# Patient Record
Sex: MALE | Race: WHITE | NOT HISPANIC OR LATINO | URBAN - METROPOLITAN AREA
[De-identification: names, ages, dates, MRNs, and addresses within clinical notes are randomized per-mention and may not be internally consistent; named-entity substitution may affect disease eponyms.]

---

## 2024-01-12 ENCOUNTER — EMERGENCY (EMERGENCY)
Facility: HOSPITAL | Age: 89
LOS: 1 days | Discharge: HOME | End: 2024-01-12
Attending: EMERGENCY MEDICINE | Admitting: EMERGENCY MEDICINE
Payer: MEDICARE

## 2024-01-12 VITALS
TEMPERATURE: 97 F | HEART RATE: 84 BPM | SYSTOLIC BLOOD PRESSURE: 135 MMHG | OXYGEN SATURATION: 94 % | DIASTOLIC BLOOD PRESSURE: 77 MMHG | RESPIRATION RATE: 18 BRPM | HEIGHT: 68 IN | WEIGHT: 175.05 LBS

## 2024-01-12 VITALS
DIASTOLIC BLOOD PRESSURE: 74 MMHG | OXYGEN SATURATION: 97 % | SYSTOLIC BLOOD PRESSURE: 124 MMHG | HEART RATE: 92 BPM | RESPIRATION RATE: 16 BRPM

## 2024-01-12 LAB
ALBUMIN SERPL ELPH-MCNC: 3 G/DL — LOW (ref 3.3–5)
ALBUMIN SERPL ELPH-MCNC: 3 G/DL — LOW (ref 3.3–5)
ALP SERPL-CCNC: 76 U/L — SIGNIFICANT CHANGE UP (ref 40–120)
ALP SERPL-CCNC: 76 U/L — SIGNIFICANT CHANGE UP (ref 40–120)
ALT FLD-CCNC: 19 U/L — SIGNIFICANT CHANGE UP (ref 10–45)
ALT FLD-CCNC: 19 U/L — SIGNIFICANT CHANGE UP (ref 10–45)
ANION GAP SERPL CALC-SCNC: 10 MMOL/L — SIGNIFICANT CHANGE UP (ref 5–17)
ANION GAP SERPL CALC-SCNC: 10 MMOL/L — SIGNIFICANT CHANGE UP (ref 5–17)
APPEARANCE UR: ABNORMAL
APPEARANCE UR: ABNORMAL
AST SERPL-CCNC: 18 U/L — SIGNIFICANT CHANGE UP (ref 10–40)
AST SERPL-CCNC: 18 U/L — SIGNIFICANT CHANGE UP (ref 10–40)
BACTERIA # UR AUTO: ABNORMAL /HPF
BACTERIA # UR AUTO: ABNORMAL /HPF
BASOPHILS # BLD AUTO: 0.08 K/UL — SIGNIFICANT CHANGE UP (ref 0–0.2)
BASOPHILS # BLD AUTO: 0.08 K/UL — SIGNIFICANT CHANGE UP (ref 0–0.2)
BASOPHILS NFR BLD AUTO: 0.9 % — SIGNIFICANT CHANGE UP (ref 0–2)
BASOPHILS NFR BLD AUTO: 0.9 % — SIGNIFICANT CHANGE UP (ref 0–2)
BILIRUB SERPL-MCNC: 1.8 MG/DL — HIGH (ref 0.2–1.2)
BILIRUB SERPL-MCNC: 1.8 MG/DL — HIGH (ref 0.2–1.2)
BILIRUB UR-MCNC: NEGATIVE — SIGNIFICANT CHANGE UP
BILIRUB UR-MCNC: NEGATIVE — SIGNIFICANT CHANGE UP
BUN SERPL-MCNC: 27 MG/DL — HIGH (ref 7–23)
BUN SERPL-MCNC: 27 MG/DL — HIGH (ref 7–23)
CALCIUM SERPL-MCNC: 9.4 MG/DL — SIGNIFICANT CHANGE UP (ref 8.4–10.5)
CALCIUM SERPL-MCNC: 9.4 MG/DL — SIGNIFICANT CHANGE UP (ref 8.4–10.5)
CHLORIDE SERPL-SCNC: 108 MMOL/L — SIGNIFICANT CHANGE UP (ref 96–108)
CHLORIDE SERPL-SCNC: 108 MMOL/L — SIGNIFICANT CHANGE UP (ref 96–108)
CO2 SERPL-SCNC: 26 MMOL/L — SIGNIFICANT CHANGE UP (ref 22–31)
CO2 SERPL-SCNC: 26 MMOL/L — SIGNIFICANT CHANGE UP (ref 22–31)
COLOR SPEC: YELLOW — SIGNIFICANT CHANGE UP
COLOR SPEC: YELLOW — SIGNIFICANT CHANGE UP
CREAT SERPL-MCNC: 1.67 MG/DL — HIGH (ref 0.5–1.3)
CREAT SERPL-MCNC: 1.67 MG/DL — HIGH (ref 0.5–1.3)
DIFF PNL FLD: ABNORMAL
DIFF PNL FLD: ABNORMAL
EGFR: 39 ML/MIN/1.73M2 — LOW
EGFR: 39 ML/MIN/1.73M2 — LOW
EOSINOPHIL # BLD AUTO: 0.43 K/UL — SIGNIFICANT CHANGE UP (ref 0–0.5)
EOSINOPHIL # BLD AUTO: 0.43 K/UL — SIGNIFICANT CHANGE UP (ref 0–0.5)
EOSINOPHIL NFR BLD AUTO: 4.6 % — SIGNIFICANT CHANGE UP (ref 0–6)
EOSINOPHIL NFR BLD AUTO: 4.6 % — SIGNIFICANT CHANGE UP (ref 0–6)
EPI CELLS # UR: 1 — SIGNIFICANT CHANGE UP
EPI CELLS # UR: 1 — SIGNIFICANT CHANGE UP
GLUCOSE SERPL-MCNC: 101 MG/DL — HIGH (ref 70–99)
GLUCOSE SERPL-MCNC: 101 MG/DL — HIGH (ref 70–99)
GLUCOSE UR QL: NEGATIVE MG/DL — SIGNIFICANT CHANGE UP
GLUCOSE UR QL: NEGATIVE MG/DL — SIGNIFICANT CHANGE UP
HCT VFR BLD CALC: 41.2 % — SIGNIFICANT CHANGE UP (ref 39–50)
HCT VFR BLD CALC: 41.2 % — SIGNIFICANT CHANGE UP (ref 39–50)
HGB BLD-MCNC: 13.5 G/DL — SIGNIFICANT CHANGE UP (ref 13–17)
HGB BLD-MCNC: 13.5 G/DL — SIGNIFICANT CHANGE UP (ref 13–17)
IMM GRANULOCYTES NFR BLD AUTO: 0.3 % — SIGNIFICANT CHANGE UP (ref 0–0.9)
IMM GRANULOCYTES NFR BLD AUTO: 0.3 % — SIGNIFICANT CHANGE UP (ref 0–0.9)
KETONES UR-MCNC: ABNORMAL MG/DL
KETONES UR-MCNC: ABNORMAL MG/DL
LEUKOCYTE ESTERASE UR-ACNC: ABNORMAL
LEUKOCYTE ESTERASE UR-ACNC: ABNORMAL
LYMPHOCYTES # BLD AUTO: 1.6 K/UL — SIGNIFICANT CHANGE UP (ref 1–3.3)
LYMPHOCYTES # BLD AUTO: 1.6 K/UL — SIGNIFICANT CHANGE UP (ref 1–3.3)
LYMPHOCYTES # BLD AUTO: 17.2 % — SIGNIFICANT CHANGE UP (ref 13–44)
LYMPHOCYTES # BLD AUTO: 17.2 % — SIGNIFICANT CHANGE UP (ref 13–44)
MCHC RBC-ENTMCNC: 30.6 PG — SIGNIFICANT CHANGE UP (ref 27–34)
MCHC RBC-ENTMCNC: 30.6 PG — SIGNIFICANT CHANGE UP (ref 27–34)
MCHC RBC-ENTMCNC: 32.8 GM/DL — SIGNIFICANT CHANGE UP (ref 32–36)
MCHC RBC-ENTMCNC: 32.8 GM/DL — SIGNIFICANT CHANGE UP (ref 32–36)
MCV RBC AUTO: 93.4 FL — SIGNIFICANT CHANGE UP (ref 80–100)
MCV RBC AUTO: 93.4 FL — SIGNIFICANT CHANGE UP (ref 80–100)
MONOCYTES # BLD AUTO: 0.8 K/UL — SIGNIFICANT CHANGE UP (ref 0–0.9)
MONOCYTES # BLD AUTO: 0.8 K/UL — SIGNIFICANT CHANGE UP (ref 0–0.9)
MONOCYTES NFR BLD AUTO: 8.6 % — SIGNIFICANT CHANGE UP (ref 2–14)
MONOCYTES NFR BLD AUTO: 8.6 % — SIGNIFICANT CHANGE UP (ref 2–14)
NEUTROPHILS # BLD AUTO: 6.34 K/UL — SIGNIFICANT CHANGE UP (ref 1.8–7.4)
NEUTROPHILS # BLD AUTO: 6.34 K/UL — SIGNIFICANT CHANGE UP (ref 1.8–7.4)
NEUTROPHILS NFR BLD AUTO: 68.4 % — SIGNIFICANT CHANGE UP (ref 43–77)
NEUTROPHILS NFR BLD AUTO: 68.4 % — SIGNIFICANT CHANGE UP (ref 43–77)
NITRITE UR-MCNC: POSITIVE
NITRITE UR-MCNC: POSITIVE
NRBC # BLD: 0 /100 WBCS — SIGNIFICANT CHANGE UP (ref 0–0)
NRBC # BLD: 0 /100 WBCS — SIGNIFICANT CHANGE UP (ref 0–0)
PH UR: 5 — SIGNIFICANT CHANGE UP (ref 5–8)
PH UR: 5 — SIGNIFICANT CHANGE UP (ref 5–8)
PLATELET # BLD AUTO: 255 K/UL — SIGNIFICANT CHANGE UP (ref 150–400)
PLATELET # BLD AUTO: 255 K/UL — SIGNIFICANT CHANGE UP (ref 150–400)
POTASSIUM SERPL-MCNC: 4.4 MMOL/L — SIGNIFICANT CHANGE UP (ref 3.5–5.3)
POTASSIUM SERPL-MCNC: 4.4 MMOL/L — SIGNIFICANT CHANGE UP (ref 3.5–5.3)
POTASSIUM SERPL-SCNC: 4.4 MMOL/L — SIGNIFICANT CHANGE UP (ref 3.5–5.3)
POTASSIUM SERPL-SCNC: 4.4 MMOL/L — SIGNIFICANT CHANGE UP (ref 3.5–5.3)
PROT SERPL-MCNC: 7.2 G/DL — SIGNIFICANT CHANGE UP (ref 6–8.3)
PROT SERPL-MCNC: 7.2 G/DL — SIGNIFICANT CHANGE UP (ref 6–8.3)
PROT UR-MCNC: 100 MG/DL
PROT UR-MCNC: 100 MG/DL
RBC # BLD: 4.41 M/UL — SIGNIFICANT CHANGE UP (ref 4.2–5.8)
RBC # BLD: 4.41 M/UL — SIGNIFICANT CHANGE UP (ref 4.2–5.8)
RBC # FLD: 14 % — SIGNIFICANT CHANGE UP (ref 10.3–14.5)
RBC # FLD: 14 % — SIGNIFICANT CHANGE UP (ref 10.3–14.5)
RBC CASTS # UR COMP ASSIST: 15 /HPF — HIGH (ref 0–4)
RBC CASTS # UR COMP ASSIST: 15 /HPF — HIGH (ref 0–4)
SODIUM SERPL-SCNC: 144 MMOL/L — SIGNIFICANT CHANGE UP (ref 135–145)
SODIUM SERPL-SCNC: 144 MMOL/L — SIGNIFICANT CHANGE UP (ref 135–145)
SP GR SPEC: 1.02 — SIGNIFICANT CHANGE UP (ref 1–1.03)
SP GR SPEC: 1.02 — SIGNIFICANT CHANGE UP (ref 1–1.03)
UROBILINOGEN FLD QL: 1 MG/DL — SIGNIFICANT CHANGE UP (ref 0.2–1)
UROBILINOGEN FLD QL: 1 MG/DL — SIGNIFICANT CHANGE UP (ref 0.2–1)
WBC # BLD: 9.28 K/UL — SIGNIFICANT CHANGE UP (ref 3.8–10.5)
WBC # BLD: 9.28 K/UL — SIGNIFICANT CHANGE UP (ref 3.8–10.5)
WBC # FLD AUTO: 9.28 K/UL — SIGNIFICANT CHANGE UP (ref 3.8–10.5)
WBC # FLD AUTO: 9.28 K/UL — SIGNIFICANT CHANGE UP (ref 3.8–10.5)
WBC UR QL: ABNORMAL /HPF (ref 0–5)
WBC UR QL: ABNORMAL /HPF (ref 0–5)

## 2024-01-12 PROCEDURE — 99285 EMERGENCY DEPT VISIT HI MDM: CPT | Mod: 25

## 2024-01-12 PROCEDURE — 80053 COMPREHEN METABOLIC PANEL: CPT

## 2024-01-12 PROCEDURE — 71045 X-RAY EXAM CHEST 1 VIEW: CPT

## 2024-01-12 PROCEDURE — 87186 SC STD MICRODIL/AGAR DIL: CPT

## 2024-01-12 PROCEDURE — 93010 ELECTROCARDIOGRAM REPORT: CPT

## 2024-01-12 PROCEDURE — 72170 X-RAY EXAM OF PELVIS: CPT | Mod: 26

## 2024-01-12 PROCEDURE — 81001 URINALYSIS AUTO W/SCOPE: CPT

## 2024-01-12 PROCEDURE — 72170 X-RAY EXAM OF PELVIS: CPT

## 2024-01-12 PROCEDURE — 70450 CT HEAD/BRAIN W/O DYE: CPT | Mod: MA

## 2024-01-12 PROCEDURE — 96360 HYDRATION IV INFUSION INIT: CPT

## 2024-01-12 PROCEDURE — 85025 COMPLETE CBC W/AUTO DIFF WBC: CPT

## 2024-01-12 PROCEDURE — 36415 COLL VENOUS BLD VENIPUNCTURE: CPT

## 2024-01-12 PROCEDURE — 87077 CULTURE AEROBIC IDENTIFY: CPT

## 2024-01-12 PROCEDURE — 70450 CT HEAD/BRAIN W/O DYE: CPT | Mod: 26,MA

## 2024-01-12 PROCEDURE — 93005 ELECTROCARDIOGRAM TRACING: CPT

## 2024-01-12 PROCEDURE — 71045 X-RAY EXAM CHEST 1 VIEW: CPT | Mod: 26

## 2024-01-12 PROCEDURE — 87086 URINE CULTURE/COLONY COUNT: CPT

## 2024-01-12 PROCEDURE — 99284 EMERGENCY DEPT VISIT MOD MDM: CPT

## 2024-01-12 RX ORDER — SODIUM CHLORIDE 9 MG/ML
500 INJECTION INTRAMUSCULAR; INTRAVENOUS; SUBCUTANEOUS ONCE
Refills: 0 | Status: COMPLETED | OUTPATIENT
Start: 2024-01-12 | End: 2024-01-12

## 2024-01-12 RX ORDER — CEPHALEXIN 500 MG
500 CAPSULE ORAL ONCE
Refills: 0 | Status: COMPLETED | OUTPATIENT
Start: 2024-01-12 | End: 2024-01-12

## 2024-01-12 RX ORDER — CEPHALEXIN 500 MG
1 CAPSULE ORAL
Qty: 42 | Refills: 0
Start: 2024-01-12 | End: 2024-01-25

## 2024-01-12 RX ADMIN — SODIUM CHLORIDE 2000 MILLILITER(S): 9 INJECTION INTRAMUSCULAR; INTRAVENOUS; SUBCUTANEOUS at 15:35

## 2024-01-12 RX ADMIN — Medication 500 MILLIGRAM(S): at 17:42

## 2024-01-12 RX ADMIN — SODIUM CHLORIDE 1000 MILLILITER(S): 9 INJECTION INTRAMUSCULAR; INTRAVENOUS; SUBCUTANEOUS at 14:22

## 2024-01-12 RX ADMIN — SODIUM CHLORIDE 500 MILLILITER(S): 9 INJECTION INTRAMUSCULAR; INTRAVENOUS; SUBCUTANEOUS at 15:35

## 2024-01-12 NOTE — ED ADULT NURSE REASSESSMENT NOTE - NS ED NURSE REASSESS COMMENT FT1
RN preformed straight catheterization at bedside using sterile technique. Drained 100cc of cloudy kelsi urine. UA & culture sent to lab. Patient tolerated procedure well.

## 2024-01-12 NOTE — ED ADULT NURSE NOTE - NSFALLHARMRISKINTERV_ED_ALL_ED
Assistance OOB with selected safe patient handling equipment if applicable/Assistance with ambulation/Communicate risk of Fall with Harm to all staff, patient, and family/Monitor gait and stability/Monitor for mental status changes and reorient to person, place, and time, as needed/Move patient closer to nursing station/within visual sight of ED staff/Provide visual cue: red socks, yellow wristband, yellow gown, etc/Reinforce activity limits and safety measures with patient and family/Toileting schedule using arm’s reach rule for commode and bathroom/Use of alarms - bed, stretcher, chair and/or video monitoring/Bed in lowest position, wheels locked, appropriate side rails in place/Call bell, personal items and telephone in reach/Instruct patient to call for assistance before getting out of bed/chair/stretcher/Non-slip footwear applied when patient is off stretcher/[place] to call system/Physically safe environment - no spills, clutter or unnecessary equipment/Purposeful Proactive Rounding/Room/bathroom lighting operational, light cord in reach Assistance OOB with selected safe patient handling equipment if applicable/Assistance with ambulation/Communicate risk of Fall with Harm to all staff, patient, and family/Monitor gait and stability/Monitor for mental status changes and reorient to person, place, and time, as needed/Move patient closer to nursing station/within visual sight of ED staff/Provide visual cue: red socks, yellow wristband, yellow gown, etc/Reinforce activity limits and safety measures with patient and family/Toileting schedule using arm’s reach rule for commode and bathroom/Use of alarms - bed, stretcher, chair and/or video monitoring/Bed in lowest position, wheels locked, appropriate side rails in place/Call bell, personal items and telephone in reach/Instruct patient to call for assistance before getting out of bed/chair/stretcher/Non-slip footwear applied when patient is off stretcher/[place] to call system/Physically safe environment - no spills, clutter or unnecessary equipment/Purposeful Proactive Rounding/Room/bathroom lighting operational, light cord in reach

## 2024-01-12 NOTE — ED PROVIDER NOTE - OBJECTIVE STATEMENT
88-year-old male presents to the emergency department status post recent history of multiple falls at nursing facility.  This report was provided to us by EMS patient states that he does not know why he is here and he has no complaints at this time.  He denies headache he denies chest pain he denies abdominal pain.  Upon chart review patient has a history of multiple UTIs metabolic encephalopathy he is DNR/DNI.Past medical history atrial fibrillation bladder cancer hyperlipidemia hypertension dementia.

## 2024-01-12 NOTE — ED PROVIDER NOTE - PATIENT PORTAL LINK FT
You can access the FollowMyHealth Patient Portal offered by [facility] by registering at the following website: [email or web address]. By joining [employer]’s FollowMyHealth portal, you will also be able to view your health information using other applications (apps) compatible with our system. You can access the FollowMyHealth Patient Portal offered by [facility] by registering at the following website: [email or web address]. By joining [employer]’s FollowMyHealth portal, you will also be able to view your health information using other applications (apps) compatible with our system.

## 2024-01-12 NOTE — ED ADULT TRIAGE NOTE - CHIEF COMPLAINT QUOTE
Patient brought in by EMS from [facility] with complaint of frequent falls and lethargic. As per EMS, patient had three unwitnessed falls. EMS unsure if patient hit head. Patient is awake. PMH of dementia. Patient brought in by EMS from [facility] with complaint of frequent falls and lethargic. As per EMS, patient had three unwitnessed falls. EMS unsure if patient hit head. Patient is awake. PMH of dementia. Patient brought in by EMS from [facility] with complaint of frequent falls and lethargic. As per EMS, patient had three unwitnessed falls. EMS unsure if patient hit head or any LOC. Patient is awake and responsive. PMH of dementia. Patient brought in by EMS from [facility] with complaint of frequent falls and lethargic. As per EMS, patient had three unwitnessed falls. EMS unsure if patient hit head or any LOC. Patient is awake and responsive. PMH of dementia. Patient brought in by EMS from [facility] with complaint of frequent falls and lethargic. As per EMS, patient had three unwitnessed falls. EMS unsure if patient hit head or any LOC. Patient is awake and responsive. Patient is unable to follow commands. PMH of dementia. Patient brought in by EMS from [facility] with complaint of frequent falls and lethargic. As per EMS, patient had three unwitnessed falls. EMS unsure if patient hit head or any LOC. Patient is awake and responsive. Patient is unable to follow commands. PMH of dementia. Patient brought in by EMS from [facility] with complaint of frequent falls and lethargic. As per EMS, patient had three unwitnessed falls. Patient is on Eliquis. EMS unsure if patient hit head or any LOC. Patient is awake and responsive. Patient is unable to follow commands. PMH of dementia. Patient brought in by EMS from [facility] with complaint of frequent falls and lethargic. As per EMS, patient had three unwitnessed falls. Patient is on Eliquis. EMS unsure if patient hit head or any LOC. Patient is awake and responsive. Patient is unable to follow commands. PMH of dementia.

## 2024-01-12 NOTE — ED PROVIDER NOTE - PROVIDER TOKENS
PROVIDER:[TOKEN:[[id number]],FOLLOWUP:[1-3 Days]] PROVIDER:[TOKEN:[[id number]],FOLLOWUP:[1-3 Days]]

## 2024-01-12 NOTE — ED PROVIDER NOTE - CARE PROVIDER_API CALL
Regino Hartmann  Internal Medicine  [address] [place] [address]  Phone: [phone]  Fax: [phone]  Follow Up Time: 1-3 Days   Regino Hartmann  Internal Medicine  [address] [place] [address]  Phone: [phone]  Fax: [phone]  Follow Up Time: 1-3 Days

## 2024-01-12 NOTE — ED ADULT NURSE NOTE - CHIEF COMPLAINT QUOTE
Patient brought in by EMS from [facility] with complaint of frequent falls and lethargic. As per EMS, patient had three unwitnessed falls. Patient is on Eliquis. EMS unsure if patient hit head or any LOC. Patient is awake and responsive. Patient is unable to follow commands. PMH of dementia. Patient brought in by EMS from [facility] with complaint of frequent falls and lethargic. As per EMS, patient had three unwitnessed falls. Patient is on Eliquis. EMS unsure if patient hit head or any LOC. Patient is awake and responsive. Patient is unable to follow commands. PMH of dementia.

## 2024-01-12 NOTE — ED PROVIDER NOTE - CARE PROVIDERS DIRECT ADDRESSES
,[email or web address]

## 2024-01-12 NOTE — ED PROVIDER NOTE - NSFOLLOWUPINSTRUCTIONS_ED_ALL_ED_FT
English    Urinary Tract Infection, Adult    A urinary tract infection (UTI) is an infection of any part of the urinary tract. The urinary tract includes the kidneys, ureters, bladder, and urethra. These organs make, store, and get rid of urine in the body.    An upper UTI affects the ureters and kidneys. A lower UTI affects the bladder and urethra.    What are the causes?  Most urinary tract infections are caused by bacteria in your genital area around your urethra, where urine leaves your body. These bacteria grow and cause inflammation of your urinary tract.    What increases the risk?  You are more likely to develop this condition if:  You have a urinary catheter that stays in place.  You are not able to control when you urinate or have a bowel movement (incontinence).  You are female and you:  Use a spermicide or diaphragm for birth control.  Have low estrogen levels.  Are pregnant.  You have certain genes that increase your risk.  You are sexually active.  You take antibiotic medicines.  You have a condition that causes your flow of urine to slow down, such as:  An enlarged prostate, if you are male.  Blockage in your urethra.  A kidney stone.  A nerve condition that affects your bladder control (neurogenic bladder).  Not getting enough to drink, or not urinating often.  You have certain medical conditions, such as:  Diabetes.  A weak disease-fighting system (immunesystem).  Sickle cell disease.  Gout.  Spinal cord injury.  What are the signs or symptoms?  Symptoms of this condition include:  Needing to urinate right away (urgency).  Frequent urination. This may include small amounts of urine each time you urinate.  Pain or burning with urination.  Blood in the urine.  Urine that smells bad or unusual.  Trouble urinating.  Cloudy urine.  Vaginal discharge, if you are female.  Pain in the abdomen or the lower back.  You may also have:  Vomiting or a decreased appetite.  Confusion.  Irritability or tiredness.  A fever or chills.  Diarrhea.  The first symptom in older adults may be confusion. In some cases, they may not have any symptoms until the infection has worsened.    How is this diagnosed?  This condition is diagnosed based on your medical history and a physical exam. You may also have other tests, including:  Urine tests.  Blood tests.  Tests for STIs (sexually transmitted infections).  If you have had more than one UTI, a cystoscopy or imaging studies may be done to determine the cause of the infections.    How is this treated?  Treatment for this condition includes:  Antibiotic medicine.  Over-the-counter medicines to treat discomfort.  Drinking enough water to stay hydrated.  If you have frequent infections or have other conditions such as a kidney stone, you may need to see a health care provider who specializes in the urinary tract (urologist).    In rare cases, urinary tract infections can cause sepsis. Sepsis is a life-threatening condition that occurs when the body responds to an infection. Sepsis is treated in the hospital with IV antibiotics, fluids, and other medicines.    Follow these instructions at home:    Medicines    Take over-the-counter and prescription medicines only as told by your health care provider.  If you were prescribed an antibiotic medicine, take it as told by your health care provider. Do not stop using the antibiotic even if you start to feel better.  General instructions    Make sure you:  Empty your bladder often and completely. Do not hold urine for long periods of time.  Empty your bladder after sex.  Wipe from front to back after urinating or having a bowel movement if you are female. Use each tissue only one time when you wipe.  Drink enough fluid to keep your urine pale yellow.  Keep all follow-up visits. This is important.  Contact a health care provider if:  Your symptoms do not get better after 1–2 days.  Your symptoms go away and then return.  Get help right away if:  You have severe pain in your back or your lower abdomen.  You have a fever or chills.  You have nausea or vomiting.  Summary  A urinary tract infection (UTI) is an infection of any part of the urinary tract, which includes the kidneys, ureters, bladder, and urethra.  Most urinary tract infections are caused by bacteria in your genital area.  Treatment for this condition often includes antibiotic medicines.  If you were prescribed an antibiotic medicine, take it as told by your health care provider. Do not stop using the antibiotic even if you start to feel better.  Keep all follow-up visits. This is important.  This information is not intended to replace advice given to you by your health care provider. Make sure you discuss any questions you have with your health care provider.    Document Revised: 07/30/2021 Document Reviewed: 07/30/2021  [employer] Patient Education © 2023 [employer]  [employer] logo  Terms and Conditions  Privacy Policy  Editorial Policy  All content on this site: Copyright © 2024 [employer], its licensors, and contributors. All rights are reserved, including those for text and data mining, AI training, and similar technologies. For all open access content, the Creative Commons licensing terms apply.  Cookies are used by this site. To decline or learn more, visit our Cookies page.  [employer] English    Urinary Tract Infection, Adult    A urinary tract infection (UTI) is an infection of any part of the urinary tract. The urinary tract includes the kidneys, ureters, bladder, and urethra. These organs make, store, and get rid of urine in the body.    An upper UTI affects the ureters and kidneys. A lower UTI affects the bladder and urethra.    What are the causes?  Most urinary tract infections are caused by bacteria in your genital area around your urethra, where urine leaves your body. These bacteria grow and cause inflammation of your urinary tract.    What increases the risk?  You are more likely to develop this condition if:  You have a urinary catheter that stays in place.  You are not able to control when you urinate or have a bowel movement (incontinence).  You are female and you:  Use a spermicide or diaphragm for birth control.  Have low estrogen levels.  Are pregnant.  You have certain genes that increase your risk.  You are sexually active.  You take antibiotic medicines.  You have a condition that causes your flow of urine to slow down, such as:  An enlarged prostate, if you are male.  Blockage in your urethra.  A kidney stone.  A nerve condition that affects your bladder control (neurogenic bladder).  Not getting enough to drink, or not urinating often.  You have certain medical conditions, such as:  Diabetes.  A weak disease-fighting system (immunesystem).  Sickle cell disease.  Gout.  Spinal cord injury.  What are the signs or symptoms?  Symptoms of this condition include:  Needing to urinate right away (urgency).  Frequent urination. This may include small amounts of urine each time you urinate.  Pain or burning with urination.  Blood in the urine.  Urine that smells bad or unusual.  Trouble urinating.  Cloudy urine.  Vaginal discharge, if you are female.  Pain in the abdomen or the lower back.  You may also have:  Vomiting or a decreased appetite.  Confusion.  Irritability or tiredness.  A fever or chills.  Diarrhea.  The first symptom in older adults may be confusion. In some cases, they may not have any symptoms until the infection has worsened.    How is this diagnosed?  This condition is diagnosed based on your medical history and a physical exam. You may also have other tests, including:  Urine tests.  Blood tests.  Tests for STIs (sexually transmitted infections).  If you have had more than one UTI, a cystoscopy or imaging studies may be done to determine the cause of the infections.    How is this treated?  Treatment for this condition includes:  Antibiotic medicine.  Over-the-counter medicines to treat discomfort.  Drinking enough water to stay hydrated.  If you have frequent infections or have other conditions such as a kidney stone, you may need to see a health care provider who specializes in the urinary tract (urologist).    In rare cases, urinary tract infections can cause sepsis. Sepsis is a life-threatening condition that occurs when the body responds to an infection. Sepsis is treated in the hospital with IV antibiotics, fluids, and other medicines.    Follow these instructions at home:    Medicines    Take over-the-counter and prescription medicines only as told by your health care provider.  If you were prescribed an antibiotic medicine, take it as told by your health care provider. Do not stop using the antibiotic even if you start to feel better.  General instructions    Make sure you:  Empty your bladder often and completely. Do not hold urine for long periods of time.  Empty your bladder after sex.  Wipe from front to back after urinating or having a bowel movement if you are female. Use each tissue only one time when you wipe.  Drink enough fluid to keep your urine pale yellow.  Keep all follow-up visits. This is important.  Contact a health care provider if:  Your symptoms do not get better after 1–2 days.  Your symptoms go away and then return.  Get help right away if:  You have severe pain in your back or your lower abdomen.  You have a fever or chills.  You have nausea or vomiting.  Summary  A urinary tract infection (UTI) is an infection of any part of the urinary tract, which includes the kidneys, ureters, bladder, and urethra.  Most urinary tract infections are caused by bacteria in your genital area.  Treatment for this condition often includes antibiotic medicines.  If you were prescribed an antibiotic medicine, take it as told by your health care provider. Do not stop using the antibiotic even if you start to feel better.  Keep all follow-up visits. This is important.  This information is not intended to replace advice given to you by your health care provider. Make sure you discuss any questions you have with your health care provider.    Document Revised: 07/30/2021 Document Reviewed: 07/30/2021  [employer] Patient Education © 2023 [employer]  [employer] logo  Terms and Conditions  Privacy Policy  Editorial Policy  All content on this site: Copyright © 2024 [employer], its licensors, and contributors. All rights are reserved, including those for text and data mining, AI training, and similar technologies. For all open access content, the Creative Commons licensing terms apply.  Cookies are used by this site. To decline or learn more, visit our Cookies page.  [employer]

## 2024-01-12 NOTE — ED ADULT NURSE NOTE - DOES PATIENT HAVE ADVANCE DIRECTIVE
Resolved  - D-Dimer 373 (negative for age adjusted 600). LE Venous Doppler 1/10 negative for DVT  - Denies SOB, LE edema No

## 2024-01-16 LAB
-  AMOXICILLIN/CLAVULANIC ACID: SIGNIFICANT CHANGE UP
-  AMPICILLIN/SULBACTAM: SIGNIFICANT CHANGE UP
-  AMPICILLIN: SIGNIFICANT CHANGE UP
-  AZTREONAM: SIGNIFICANT CHANGE UP
-  CEFAZOLIN: SIGNIFICANT CHANGE UP
-  CEFEPIME: SIGNIFICANT CHANGE UP
-  CEFOXITIN: SIGNIFICANT CHANGE UP
-  CEFTRIAXONE: SIGNIFICANT CHANGE UP
-  CIPROFLOXACIN: SIGNIFICANT CHANGE UP
-  ERTAPENEM: SIGNIFICANT CHANGE UP
-  GENTAMICIN: SIGNIFICANT CHANGE UP
-  IMIPENEM: SIGNIFICANT CHANGE UP
-  LEVOFLOXACIN: SIGNIFICANT CHANGE UP
-  MEROPENEM: SIGNIFICANT CHANGE UP
-  NITROFURANTOIN: SIGNIFICANT CHANGE UP
-  PIPERACILLIN/TAZOBACTAM: SIGNIFICANT CHANGE UP
-  TOBRAMYCIN: SIGNIFICANT CHANGE UP
-  TRIMETHOPRIM/SULFAMETHOXAZOLE: SIGNIFICANT CHANGE UP
CULTURE RESULTS: ABNORMAL
METHOD TYPE: SIGNIFICANT CHANGE UP
ORGANISM # SPEC MICROSCOPIC CNT: ABNORMAL
ORGANISM # SPEC MICROSCOPIC CNT: SIGNIFICANT CHANGE UP
SPECIMEN SOURCE: SIGNIFICANT CHANGE UP

## 2024-07-15 ENCOUNTER — EMERGENCY (EMERGENCY)
Facility: HOSPITAL | Age: 89
LOS: 1 days | Discharge: HOME | End: 2024-07-15
Attending: EMERGENCY MEDICINE | Admitting: EMERGENCY MEDICINE
Payer: MEDICARE

## 2024-07-15 VITALS
DIASTOLIC BLOOD PRESSURE: 91 MMHG | RESPIRATION RATE: 15 BRPM | OXYGEN SATURATION: 99 % | HEART RATE: 88 BPM | SYSTOLIC BLOOD PRESSURE: 153 MMHG

## 2024-07-15 VITALS
SYSTOLIC BLOOD PRESSURE: 115 MMHG | TEMPERATURE: 97 F | WEIGHT: 179.9 LBS | HEIGHT: 68 IN | HEART RATE: 83 BPM | DIASTOLIC BLOOD PRESSURE: 71 MMHG | RESPIRATION RATE: 16 BRPM | OXYGEN SATURATION: 98 %

## 2024-07-15 PROBLEM — N39.0 URINARY TRACT INFECTION, SITE NOT SPECIFIED: Chronic | Status: ACTIVE | Noted: 2024-01-12

## 2024-07-15 PROCEDURE — 99283 EMERGENCY DEPT VISIT LOW MDM: CPT

## 2024-07-15 PROCEDURE — 99282 EMERGENCY DEPT VISIT SF MDM: CPT

## 2024-07-22 ENCOUNTER — INPATIENT (INPATIENT)
Facility: HOSPITAL | Age: 89
LOS: 14 days | Discharge: NURSING, REHAB OR LONG-TERM CARE | End: 2024-08-06
Attending: INTERNAL MEDICINE | Admitting: INTERNAL MEDICINE
Payer: MEDICARE

## 2024-07-22 VITALS
DIASTOLIC BLOOD PRESSURE: 76 MMHG | OXYGEN SATURATION: 100 % | SYSTOLIC BLOOD PRESSURE: 121 MMHG | RESPIRATION RATE: 14 BRPM | HEART RATE: 80 BPM

## 2024-07-22 DIAGNOSIS — Z29.9 ENCOUNTER FOR PROPHYLACTIC MEASURES, UNSPECIFIED: ICD-10-CM

## 2024-07-22 DIAGNOSIS — I48.20 CHRONIC ATRIAL FIBRILLATION, UNSPECIFIED: ICD-10-CM

## 2024-07-22 DIAGNOSIS — I10 ESSENTIAL (PRIMARY) HYPERTENSION: ICD-10-CM

## 2024-07-22 DIAGNOSIS — F03.918 UNSPECIFIED DEMENTIA, UNSPECIFIED SEVERITY, WITH OTHER BEHAVIORAL DISTURBANCE: ICD-10-CM

## 2024-07-22 DIAGNOSIS — N18.30 CHRONIC KIDNEY DISEASE, STAGE 3 UNSPECIFIED: ICD-10-CM

## 2024-07-22 DIAGNOSIS — F03.911 UNSPECIFIED DEMENTIA, UNSPECIFIED SEVERITY, WITH AGITATION: ICD-10-CM

## 2024-07-22 LAB
ADD ON TEST-SPECIMEN IN LAB: SIGNIFICANT CHANGE UP
ALBUMIN SERPL ELPH-MCNC: 3.6 G/DL — SIGNIFICANT CHANGE UP (ref 3.3–5)
ALP SERPL-CCNC: 85 U/L — SIGNIFICANT CHANGE UP (ref 40–120)
ALT FLD-CCNC: 7 U/L — SIGNIFICANT CHANGE UP (ref 4–41)
AMPHET UR-MCNC: NEGATIVE — SIGNIFICANT CHANGE UP
ANION GAP SERPL CALC-SCNC: 13 MMOL/L — SIGNIFICANT CHANGE UP (ref 7–14)
APAP SERPL-MCNC: <10 UG/ML — LOW (ref 15–25)
APPEARANCE UR: CLEAR — SIGNIFICANT CHANGE UP
AST SERPL-CCNC: 15 U/L — SIGNIFICANT CHANGE UP (ref 4–40)
BARBITURATES UR SCN-MCNC: NEGATIVE — SIGNIFICANT CHANGE UP
BASOPHILS # BLD AUTO: 0.1 K/UL — SIGNIFICANT CHANGE UP (ref 0–0.2)
BASOPHILS NFR BLD AUTO: 1.4 % — SIGNIFICANT CHANGE UP (ref 0–2)
BENZODIAZ UR-MCNC: POSITIVE
BILIRUB SERPL-MCNC: 0.4 MG/DL — SIGNIFICANT CHANGE UP (ref 0.2–1.2)
BILIRUB UR-MCNC: NEGATIVE — SIGNIFICANT CHANGE UP
BUN SERPL-MCNC: 40 MG/DL — HIGH (ref 7–23)
CALCIUM SERPL-MCNC: 9.2 MG/DL — SIGNIFICANT CHANGE UP (ref 8.4–10.5)
CHLORIDE SERPL-SCNC: 105 MMOL/L — SIGNIFICANT CHANGE UP (ref 98–107)
CO2 SERPL-SCNC: 21 MMOL/L — LOW (ref 22–31)
COCAINE METAB.OTHER UR-MCNC: NEGATIVE — SIGNIFICANT CHANGE UP
COLOR SPEC: YELLOW — SIGNIFICANT CHANGE UP
CREAT SERPL-MCNC: 1.68 MG/DL — HIGH (ref 0.5–1.3)
CREATININE URINE RESULT, DAU: 57 MG/DL — SIGNIFICANT CHANGE UP
DIFF PNL FLD: NEGATIVE — SIGNIFICANT CHANGE UP
EGFR: 39 ML/MIN/1.73M2 — LOW
EOSINOPHIL # BLD AUTO: 0.53 K/UL — HIGH (ref 0–0.5)
EOSINOPHIL NFR BLD AUTO: 7.5 % — HIGH (ref 0–6)
ETHANOL SERPL-MCNC: <10 MG/DL — SIGNIFICANT CHANGE UP
FENTANYL UR QL SCN: NEGATIVE — SIGNIFICANT CHANGE UP
GLUCOSE SERPL-MCNC: 87 MG/DL — SIGNIFICANT CHANGE UP (ref 70–99)
GLUCOSE UR QL: NEGATIVE MG/DL — SIGNIFICANT CHANGE UP
HCT VFR BLD CALC: 37.9 % — LOW (ref 39–50)
HGB BLD-MCNC: 12.3 G/DL — LOW (ref 13–17)
IANC: 4.64 K/UL — SIGNIFICANT CHANGE UP (ref 1.8–7.4)
IMM GRANULOCYTES NFR BLD AUTO: 0.4 % — SIGNIFICANT CHANGE UP (ref 0–0.9)
KETONES UR-MCNC: NEGATIVE MG/DL — SIGNIFICANT CHANGE UP
LEUKOCYTE ESTERASE UR-ACNC: NEGATIVE — SIGNIFICANT CHANGE UP
LYMPHOCYTES # BLD AUTO: 1.08 K/UL — SIGNIFICANT CHANGE UP (ref 1–3.3)
LYMPHOCYTES # BLD AUTO: 15.3 % — SIGNIFICANT CHANGE UP (ref 13–44)
MCHC RBC-ENTMCNC: 30.1 PG — SIGNIFICANT CHANGE UP (ref 27–34)
MCHC RBC-ENTMCNC: 32.5 GM/DL — SIGNIFICANT CHANGE UP (ref 32–36)
MCV RBC AUTO: 92.9 FL — SIGNIFICANT CHANGE UP (ref 80–100)
METHADONE UR-MCNC: NEGATIVE — SIGNIFICANT CHANGE UP
MONOCYTES # BLD AUTO: 0.69 K/UL — SIGNIFICANT CHANGE UP (ref 0–0.9)
MONOCYTES NFR BLD AUTO: 9.8 % — SIGNIFICANT CHANGE UP (ref 2–14)
NEUTROPHILS # BLD AUTO: 4.64 K/UL — SIGNIFICANT CHANGE UP (ref 1.8–7.4)
NEUTROPHILS NFR BLD AUTO: 65.6 % — SIGNIFICANT CHANGE UP (ref 43–77)
NITRITE UR-MCNC: NEGATIVE — SIGNIFICANT CHANGE UP
NRBC # BLD: 0 /100 WBCS — SIGNIFICANT CHANGE UP (ref 0–0)
NRBC # FLD: 0 K/UL — SIGNIFICANT CHANGE UP (ref 0–0)
OPIATES UR-MCNC: NEGATIVE — SIGNIFICANT CHANGE UP
OXYCODONE UR-MCNC: NEGATIVE — SIGNIFICANT CHANGE UP
PCP SPEC-MCNC: SIGNIFICANT CHANGE UP
PCP UR-MCNC: NEGATIVE — SIGNIFICANT CHANGE UP
PH UR: 5.5 — SIGNIFICANT CHANGE UP (ref 5–8)
PLATELET # BLD AUTO: 246 K/UL — SIGNIFICANT CHANGE UP (ref 150–400)
POTASSIUM SERPL-MCNC: 4.2 MMOL/L — SIGNIFICANT CHANGE UP (ref 3.5–5.3)
POTASSIUM SERPL-SCNC: 4.2 MMOL/L — SIGNIFICANT CHANGE UP (ref 3.5–5.3)
PROT SERPL-MCNC: 6.8 G/DL — SIGNIFICANT CHANGE UP (ref 6–8.3)
PROT UR-MCNC: NEGATIVE MG/DL — SIGNIFICANT CHANGE UP
RBC # BLD: 4.08 M/UL — LOW (ref 4.2–5.8)
RBC # FLD: 14.4 % — SIGNIFICANT CHANGE UP (ref 10.3–14.5)
SALICYLATES SERPL-MCNC: <0.3 MG/DL — LOW (ref 15–30)
SODIUM SERPL-SCNC: 139 MMOL/L — SIGNIFICANT CHANGE UP (ref 135–145)
SP GR SPEC: 1.01 — SIGNIFICANT CHANGE UP (ref 1–1.03)
THC UR QL: NEGATIVE — SIGNIFICANT CHANGE UP
UROBILINOGEN FLD QL: 1 MG/DL — SIGNIFICANT CHANGE UP (ref 0.2–1)
WBC # BLD: 7.07 K/UL — SIGNIFICANT CHANGE UP (ref 3.8–10.5)
WBC # FLD AUTO: 7.07 K/UL — SIGNIFICANT CHANGE UP (ref 3.8–10.5)

## 2024-07-22 PROCEDURE — 99223 1ST HOSP IP/OBS HIGH 75: CPT

## 2024-07-22 PROCEDURE — 70450 CT HEAD/BRAIN W/O DYE: CPT | Mod: 26,MC

## 2024-07-22 PROCEDURE — 99285 EMERGENCY DEPT VISIT HI MDM: CPT | Mod: GC

## 2024-07-22 PROCEDURE — 71045 X-RAY EXAM CHEST 1 VIEW: CPT | Mod: 26

## 2024-07-22 RX ORDER — PRAMIPEXOLE DIHYDROCHLORIDE 0.5 MG/1
2.5 TABLET ORAL ONCE
Refills: 0 | Status: COMPLETED | OUTPATIENT
Start: 2024-07-22 | End: 2024-07-22

## 2024-07-22 RX ORDER — MAGNESIUM SULFATE 500 MG/ML
1 VIAL (ML) INJECTION ONCE
Refills: 0 | Status: DISCONTINUED | OUTPATIENT
Start: 2024-07-22 | End: 2024-07-22

## 2024-07-22 RX ORDER — LORAZEPAM 1 MG/1
2 TABLET ORAL ONCE
Refills: 0 | Status: DISCONTINUED | OUTPATIENT
Start: 2024-07-22 | End: 2024-07-22

## 2024-07-22 RX ADMIN — PRAMIPEXOLE DIHYDROCHLORIDE 2.5 MILLIGRAM(S): 0.5 TABLET ORAL at 16:23

## 2024-07-22 RX ADMIN — LORAZEPAM 2 MILLIGRAM(S): 1 TABLET ORAL at 19:36

## 2024-07-22 RX ADMIN — PRAMIPEXOLE DIHYDROCHLORIDE 2.5 MILLIGRAM(S): 0.5 TABLET ORAL at 16:38

## 2024-07-22 NOTE — ED ADULT NURSE REASSESSMENT NOTE - NS ED NURSE REASSESS COMMENT FT1
Pt awake agitated, aggressive towards staff, physically and verbally abusive towards staff. Pt trying to get out of bed, not following commands at this time. MD at bedside. Patient medicated as ordered. RR equal and unlabored. Care plan continued. Comfort measures provided. Safety maintained. Awaiting further orders. Pt awake agitated, aggressive towards staff, physically and verbally abusive towards staff. Pt trying to get out of bed, not following commands at this time. MD at bedside. Security at bedside. Patient medicated as ordered. RR equal and unlabored. Care plan continued. Comfort measures provided. Safety maintained. Awaiting further orders.

## 2024-07-22 NOTE — H&P ADULT - NSHPLABSRESULTS_GEN_ALL_CORE
.  LABS:                         12.3   7.07  )-----------( 246      ( 2024 14:37 )             37.9         139  |  105  |  40<H>  ----------------------------<  87  4.2   |  21<L>  |  1.68<H>    Ca    9.2      2024 14:37  Mg     2.20         TPro  6.8  /  Alb  3.6  /  TBili  0.4  /  DBili  x   /  AST  15  /  ALT  7   /  AlkPhos  85        Urinalysis Basic - ( 2024 22:06 )    Color: Yellow / Appearance: Clear / S.015 / pH: x  Gluc: x / Ketone: Negative mg/dL  / Bili: Negative / Urobili: 1.0 mg/dL   Blood: x / Protein: Negative mg/dL / Nitrite: Negative   Leuk Esterase: Negative / RBC: x / WBC x   Sq Epi: x / Non Sq Epi: x / Bacteria: x                RADIOLOGY, EKG & ADDITIONAL TESTS: Reviewed.

## 2024-07-22 NOTE — H&P ADULT - NSHPPHYSICALEXAM_GEN_ALL_CORE
VITAL SIGNS:  T(C): 36.3 (07-22-24 @ 22:10), Max: 36.8 (07-22-24 @ 19:26)  T(F): 97.4 (07-22-24 @ 22:10), Max: 98.2 (07-22-24 @ 19:26)  HR: 80 (07-22-24 @ 22:10) (80 - 84)  BP: 128/81 (07-22-24 @ 22:10) (121/76 - 154/78)  BP(mean): --  RR: 18 (07-22-24 @ 22:10) (14 - 18)  SpO2: 98% (07-22-24 @ 22:10) (98% - 100%)  Wt(kg): --    PHYSICAL EXAM:    Constitutional: resting comfortably in bed; NAD  Head: NC/AT  Eyes: PERRL, EOMI, anicteric sclera  ENT: no nasal discharge; uvula midline, no oropharyngeal erythema or exudates; MMM  Neck: supple  Respiratory: CTA B/L; no W/R/R, no retractions  Cardiac: +S1/S2; RRR; no M/R/G  Gastrointestinal: abdomen soft, NT/ND; no rebound or guarding; +BSx4  Back: spine midline, no bony tenderness  Extremities: WWP, no clubbing or cyanosis; no peripheral edema  Musculoskeletal: NROM x4; no joint swelling, tenderness or erythema  Vascular: distal pulses intact  Dermatologic: skin warm, dry and intact; no rashes  Lymphatic: no submandibular or cervical LAD  Neurologic: AAOx3; moves all 4 extremities  Psychiatric: affect and characteristics of appearance, verbalizations, behaviors are appropriate VITAL SIGNS:  T(C): 36.3 (07-22-24 @ 22:10), Max: 36.8 (07-22-24 @ 19:26)  T(F): 97.4 (07-22-24 @ 22:10), Max: 98.2 (07-22-24 @ 19:26)  HR: 80 (07-22-24 @ 22:10) (80 - 84)  BP: 128/81 (07-22-24 @ 22:10) (121/76 - 154/78)  BP(mean): --  RR: 18 (07-22-24 @ 22:10) (14 - 18)  SpO2: 98% (07-22-24 @ 22:10) (98% - 100%)  Wt(kg): --    PHYSICAL EXAM:    Constitutional: resting comfortably in bed; NAD  Head: NC/AT  Eyes: PERRL, anicteric sclera  ENT: no nasal discharge; uvula midline, no oropharyngeal erythema or exudates; MMM  Neck: supple  Respiratory: CTA B/L; no W/R/R, no retractions  Cardiac: +S1/S2; RRR; no M/R/G  Gastrointestinal: abdomen soft, NT/ND; no rebound or guarding; +BSx4  Back: spine midline, no bony tenderness  Extremities: WWP, no clubbing or cyanosis; no peripheral edema  Musculoskeletal: no joint swelling, tenderness or erythema  Vascular: distal pulses intact  Dermatologic: skin warm, dry, no rashes, skin tears on upper and lower extremities   Lymphatic: no submandibular or cervical LAD  Neurologic: sedated, awakes to voice and noxious stimuli however does not participate in exam, moves all 4 extremities   Psychiatric: calm

## 2024-07-22 NOTE — ED PROVIDER NOTE - PROGRESS NOTE DETAILS
Gwen Prado MD PGY-3: Spoke with psychiatry, no [facility] psych beds available.  Patient sleeping comfortably after multiple rounds of medication required due to agitation and swinging at staff.  Will plan for medicine admission with psych consultation Gwen Prado MD PGY-3: CT head nonactionable, UA without infection, will TBA medicine under Dr Reveles

## 2024-07-22 NOTE — ED ADULT NURSE REASSESSMENT NOTE - NS ED NURSE REASSESS COMMENT FT1
Pt continuous to be awake agitated, aggressive towards staff, physically and verbally abusive towards staff. Pt trying to get out of bed, not following commands at this time. not re-directable at this time. MD at bedside. Patient medicated as ordered. RR equal and unlabored. Care plan continued. Comfort measures provided. Safety maintained. Awaiting further orders.

## 2024-07-22 NOTE — ED PROVIDER NOTE - CLINICAL SUMMARY MEDICAL DECISION MAKING FREE TEXT BOX
Cassidy: 87yo M, hx of A/ fib on Eliquis, HTN, recently started on Xanax for anxiety last week, dementia AOx0 at baseline, presents for agitation. Normotensive on vitals. Mentating at baseline, physical exam benign. AMS could be due to organic causes such as electrolyte vs metabolic etiology vs uti vs pna, will evaluate for those things, if negative, consider calling psych for evaluation      Cassidy:
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
## 2024-07-22 NOTE — ED ADULT NURSE NOTE - NSFALLHARMRISKINTERV_ED_ALL_ED
Assistance OOB with selected safe patient handling equipment if applicable/Assistance with ambulation/Communicate risk of Fall with Harm to all staff, patient, and family/Monitor gait and stability/Provide visual cue: red socks, yellow wristband, yellow gown, etc/Reinforce activity limits and safety measures with patient and family/Bed in lowest position, wheels locked, appropriate side rails in place/Call bell, personal items and telephone in reach/Instruct patient to call for assistance before getting out of bed/chair/stretcher/Non-slip footwear applied when patient is off stretcher/[place] to call system/Physically safe environment - no spills, clutter or unnecessary equipment/Purposeful Proactive Rounding/Room/bathroom lighting operational, light cord in reach

## 2024-07-22 NOTE — H&P ADULT - NSICDXPASTMEDICALHX_GEN_ALL_CORE_FT
PAST MEDICAL HISTORY:  Atrial fibrillation     Bacterial UTI     Dementia     HTN (hypertension)

## 2024-07-22 NOTE — ED ADULT NURSE REASSESSMENT NOTE - NS ED NURSE REASSESS COMMENT FT1
report received from IRIS Muro. pt received A&ox0, restless in stretcher. remains on 1:1 at this time d/t risk for harm to self and others. respirations even and unlabored. no acute distress noted. remains on continuous monitor, NSR noted. pt straight cath'd as per orders. UA/UC sent. awaiting results and dispo. safety maintained, side rails up
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
## 2024-07-22 NOTE — ED ADULT NURSE REASSESSMENT NOTE - NS ED NURSE REASSESS COMMENT FT1
Pt did not sit still in CT scan. Aggressive towards CT staff. CT unable to be performed. MD aware. Pt to be medicated prior to  CT scan . Safety maintained.

## 2024-07-22 NOTE — H&P ADULT - PROBLEM SELECTOR PLAN 5
DVT prophylaxis: eliquis  Diet: dash/tlc DVT prophylaxis: eliquis  Diet: dash/tlc    Copy of MOLST form in chart. DNR/DNI, verify w/ family in AM.

## 2024-07-22 NOTE — ED ADULT NURSE REASSESSMENT NOTE - NS ED NURSE REASSESS COMMENT FT1
Noted to have to wounds to the bilateral feet. Noted to have swelling and redness bilaterally to the feet. wounds noted to be foul smelling. Open wounds to the right foot noted.
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
## 2024-07-22 NOTE — H&P ADULT - PROBLEM SELECTOR PLAN 1
Pt presents with worsening agitation, possibly in setting of worsening dementia. current toxic/metabolic/infectious work up negative. S/p IV haldol 2.5 mg x2 and IV ativan 2 mg x1.   - psych eval in AM   - QTc 526, correct QTc 507, will hold olanzapine for now and f/u AM EKG. Pt presents with worsening agitation, possibly in setting of worsening dementia. current toxic/metabolic/infectious work up negative. S/p IV haldol 2.5 mg x2 and IV ativan 2 mg x1.   - psych eval in AM   - QTc 526, correct QTc 507, will hold olanzapine for now and f/u AM EKG.  - c/w CO

## 2024-07-22 NOTE — ED PROVIDER NOTE - PHYSICAL EXAMINATION
Physical Exam:  Gen: NAD, non-toxic appearing, calm  Head: NCAT  HEENT: EOMI, PEERLA, normal conjunctiva, tongue midline, oral mucosa moist  Lung: no respiratory distress, speaking in full sentences  CV: RRR, no murmurs, rubs or gallops  Abd: soft, NT, ND, no guarding, no rigidity, no rebound tenderness  MSK: no visible deformities, spontaneously moving all extremities  Neuro: No focal sensory or motor deficits  Skin: Warm, well perfused, no rash, no leg swelling

## 2024-07-22 NOTE — H&P ADULT - ASSESSMENT
Pt is an 87 yo M, hx of A/ fib on Eliquis, HTN, recently started on Xanax for anxiety last week, dementia AOx0 at baseline, presents for agitation.  Pt is an 87 yo M, hx of A/ fib on Eliquis, HTN, CHF, pacemaker, bladder neoplasm, recently started on Xanax for anxiety last week, dementia AOx0 at baseline, presents for increased agitation. Pt admitted for further management.

## 2024-07-22 NOTE — ED ADULT TRIAGE NOTE - CHIEF COMPLAINT QUOTE
Brought directly to Trauma B for repeat low BP readings in 60s and 80s systolic. Patient is lethargic, but eyes open to speech. Per EMS, Versed was given in field at 1:04 PM for combative, aggressive behavior.

## 2024-07-23 PROCEDURE — 99232 SBSQ HOSP IP/OBS MODERATE 35: CPT

## 2024-07-23 PROCEDURE — 99223 1ST HOSP IP/OBS HIGH 75: CPT

## 2024-07-23 RX ORDER — ASPIRIN 500 MG
81 TABLET ORAL DAILY
Refills: 0 | Status: DISCONTINUED | OUTPATIENT
Start: 2024-07-23 | End: 2024-08-06

## 2024-07-23 RX ORDER — ALPRAZOLAM 0.5 MG
1 TABLET ORAL
Refills: 0 | Status: DISCONTINUED | OUTPATIENT
Start: 2024-07-23 | End: 2024-07-26

## 2024-07-23 RX ORDER — AMIODARONE HYDROCHLORIDE 50 MG/ML
100 INJECTION, SOLUTION INTRAVENOUS DAILY
Refills: 0 | Status: DISCONTINUED | OUTPATIENT
Start: 2024-07-23 | End: 2024-08-06

## 2024-07-23 RX ORDER — ATORVASTATIN CALCIUM 40 MG/1
20 TABLET, FILM COATED ORAL AT BEDTIME
Refills: 0 | Status: DISCONTINUED | OUTPATIENT
Start: 2024-07-23 | End: 2024-08-06

## 2024-07-23 RX ORDER — GINGER ROOT/GINGER ROOT EXT 262.5 MG
500 CAPSULE ORAL DAILY
Refills: 0 | Status: COMPLETED | OUTPATIENT
Start: 2024-07-23 | End: 2024-07-27

## 2024-07-23 RX ORDER — BACTERIOSTATIC SODIUM CHLORIDE 0.9 %
1000 VIAL (ML) INJECTION
Refills: 0 | Status: DISCONTINUED | OUTPATIENT
Start: 2024-07-23 | End: 2024-07-26

## 2024-07-23 RX ORDER — PRAMIPEXOLE DIHYDROCHLORIDE 0.5 MG/1
2.5 TABLET ORAL ONCE
Refills: 0 | Status: COMPLETED | OUTPATIENT
Start: 2024-07-23 | End: 2024-07-23

## 2024-07-23 RX ORDER — MIDODRINE HYDROCHLORIDE 2.5 MG/1
10 TABLET ORAL
Refills: 0 | Status: DISCONTINUED | OUTPATIENT
Start: 2024-07-23 | End: 2024-07-29

## 2024-07-23 RX ORDER — MELATONIN 3 MG
3 TABLET ORAL AT BEDTIME
Refills: 0 | Status: DISCONTINUED | OUTPATIENT
Start: 2024-07-23 | End: 2024-08-06

## 2024-07-23 RX ORDER — METOPROLOL TARTRATE 100 MG
1 TABLET ORAL
Refills: 0 | DISCHARGE

## 2024-07-23 RX ORDER — ACETAMINOPHEN 500 MG
650 TABLET ORAL EVERY 6 HOURS
Refills: 0 | Status: DISCONTINUED | OUTPATIENT
Start: 2024-07-23 | End: 2024-08-06

## 2024-07-23 RX ORDER — APIXABAN 5 MG/1
2.5 TABLET, FILM COATED ORAL
Refills: 0 | Status: DISCONTINUED | OUTPATIENT
Start: 2024-07-23 | End: 2024-08-06

## 2024-07-23 RX ORDER — METOPROLOL TARTRATE 100 MG
25 TABLET ORAL DAILY
Refills: 0 | Status: DISCONTINUED | OUTPATIENT
Start: 2024-07-23 | End: 2024-08-01

## 2024-07-23 RX ORDER — DIVALPROEX SODIUM 125 MG/1
250 CAPSULE, COATED PELLETS ORAL EVERY 8 HOURS
Refills: 0 | Status: DISCONTINUED | OUTPATIENT
Start: 2024-07-23 | End: 2024-07-30

## 2024-07-23 RX ADMIN — APIXABAN 2.5 MILLIGRAM(S): 5 TABLET, FILM COATED ORAL at 22:23

## 2024-07-23 RX ADMIN — Medication 650 MILLIGRAM(S): at 12:31

## 2024-07-23 RX ADMIN — Medication 3 MILLIGRAM(S): at 22:24

## 2024-07-23 RX ADMIN — Medication 25 MILLIGRAM(S): at 07:57

## 2024-07-23 RX ADMIN — PRAMIPEXOLE DIHYDROCHLORIDE 2.5 MILLIGRAM(S): 0.5 TABLET ORAL at 12:30

## 2024-07-23 RX ADMIN — Medication 105 MILLIGRAM(S): at 12:31

## 2024-07-23 RX ADMIN — APIXABAN 2.5 MILLIGRAM(S): 5 TABLET, FILM COATED ORAL at 07:56

## 2024-07-23 RX ADMIN — Medication 75 MILLILITER(S): at 10:33

## 2024-07-23 RX ADMIN — Medication 650 MILLIGRAM(S): at 13:01

## 2024-07-23 RX ADMIN — DIVALPROEX SODIUM 250 MILLIGRAM(S): 125 CAPSULE, COATED PELLETS ORAL at 22:23

## 2024-07-23 RX ADMIN — MIDODRINE HYDROCHLORIDE 10 MILLIGRAM(S): 2.5 TABLET ORAL at 07:56

## 2024-07-23 RX ADMIN — Medication 1 MILLIGRAM(S): at 07:56

## 2024-07-23 RX ADMIN — ATORVASTATIN CALCIUM 20 MILLIGRAM(S): 40 TABLET, FILM COATED ORAL at 22:24
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
## 2024-07-23 NOTE — BH CONSULTATION LIAISON ASSESSMENT NOTE - NSBHCHARTREVIEWVS_PSY_A_CORE FT
Vital Signs Last 24 Hrs  T(C): 36.4 (23 Jul 2024 10:00), Max: 36.8 (22 Jul 2024 19:26)  T(F): 97.5 (23 Jul 2024 10:00), Max: 98.2 (22 Jul 2024 19:26)  HR: 80 (23 Jul 2024 10:00) (78 - 84)  BP: 120/71 (23 Jul 2024 10:00) (120/71 - 154/78)  BP(mean): --  RR: 17 (23 Jul 2024 10:00) (14 - 22)  SpO2: 98% (23 Jul 2024 10:00) (98% - 100%)    Parameters below as of 23 Jul 2024 10:00  Patient On (Oxygen Delivery Method): room air

## 2024-07-23 NOTE — ED ADULT NURSE REASSESSMENT NOTE - NS ED NURSE REASSESS COMMENT FT1
pt awake, A&Ox1. medicated as per orders. cooperative and redirect at this time. CO remains. PCA at bedside. safety maintained, side rails up.

## 2024-07-23 NOTE — PROGRESS NOTE ADULT - PROBLEM SELECTOR PLAN 1
Pt presents with worsening agitation, possibly in setting of worsening dementia. current toxic/metabolic/infectious work up negative. S/p IV haldol 2.5 mg x2 and IV ativan 2 mg x1.   - called psych consult, fu recs   - QTc 526, correct QTc 507, will hold olanzapine for now , f/up repeat ECG   - c/w CO Pt presents with worsening agitation, acute encephalopathy superimposed on chronic dementia, possibly in setting of worsening dementia. current toxic/metabolic/infectious work up negative. S/p IV haldol 2.5 mg x2 and IV ativan 2 mg x1.   - called psych consult, fu recs   - CT Anterior communicating artery aneurysm coil embolization. No acute   intracranial hemorrhage. Chronic left frontal infarction.  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  Advanced chronic microvascular ischemic changes.  - QTc 526, correct QTc 507, will hold olanzapine for now , f/up repeat ECG   - IV thiamine  - c/w ASA/statin for hx of CVA  - IVF hydration for mild azotemia BUN/Cr 40/1.68, unclear about baseline Cr   - c/w CO for intermittent agitation Pt presents with worsening agitation, acute encephalopathy superimposed on chronic dementia, possibly in setting of worsening dementia. current toxic/metabolic/infectious work up negative. S/p IV haldol 2.5 mg x2 and IV ativan 2 mg x1.   - called psych consult, fu recs   - avoid benzo/sedatives  - urine tox benzo  - CTH Anterior communicating artery aneurysm coil embolization. No acute   intracranial hemorrhage. Chronic left frontal infarction.  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  Advanced chronic microvascular ischemic changes.  - QTc 526, correct QTc 507, will hold olanzapine for now , f/up repeat ECG   - IV thiamine  - c/w ASA/statin for hx of CVA  - IVF hydration for mild azotemia BUN/Cr 40/1.68, unclear about baseline Cr   - c/w CO for intermittent agitation

## 2024-07-23 NOTE — BH CONSULTATION LIAISON ASSESSMENT NOTE - CURRENT MEDICATION
MEDICATIONS  (STANDING):  aMIOdarone    Tablet 100 milliGRAM(s) Oral daily  apixaban 2.5 milliGRAM(s) Oral two times a day  aspirin  chewable 81 milliGRAM(s) Oral daily  atorvastatin 20 milliGRAM(s) Oral at bedtime  melatonin 3 milliGRAM(s) Oral at bedtime  metoprolol succinate ER 25 milliGRAM(s) Oral daily  midodrine. 10 milliGRAM(s) Oral <User Schedule>  sodium chloride 0.9%. 1000 milliLiter(s) (75 mL/Hr) IV Continuous <Continuous>  thiamine IVPB 500 milliGRAM(s) IV Intermittent daily    MEDICATIONS  (PRN):  acetaminophen     Tablet .. 650 milliGRAM(s) Oral every 6 hours PRN Temp greater or equal to 38C (100.4F), Mild Pain (1 - 3)  ALPRAZolam 1 milliGRAM(s) Oral two times a day PRN for anxiety

## 2024-07-23 NOTE — ED ADULT NURSE REASSESSMENT NOTE - NS ED NURSE REASSESS COMMENT FT1
Patient is restless, trying to get out of stretcher, uncooperative w/ED staff at this time, Spoke w/ACP NP Momau on the phone and informed patient's current status. Will wait for further orders.

## 2024-07-23 NOTE — ED ADULT NURSE REASSESSMENT NOTE - NS ED NURSE REASSESS COMMENT FT1
Spoke w/ACP NP Jesse Simon on the phone and informed the staff from [facility] called for patient to get psych evaluation, patient needs to be admitted to [facility] and can not come back to the Nursing home. Admitting MD Dr. Weldon informed and at the bedside evaluation. Waiting for Psych evaluation as per MD's order. Spoke w/ACP NP Hailey Simon on the phone and informed the staff from [facility] called for patient to get psych evaluation, patient needs to be admitted to [facility] and can not come back to the Nursing home. Admitting MD Dr. Weldon informed and at the bedside evaluation. Waiting for Psych evaluation as per MD's order.

## 2024-07-23 NOTE — BH CONSULTATION LIAISON ASSESSMENT NOTE - SUMMARY
Pt is an 87 yo M, hx of A/ fib on Eliquis, HTN, CHF, pacemaker, bladder neoplasm, recently started on Xanax for anxiety last week, dementia AOx0 at baseline, presents for increased agitation. Psych consulted for agitation. Presenting with classic delirium on dementia picture. Factors include recent Xanax exposure, medical concerns, advanced age/dementia. Warrants medical and psych optimization.     PLAN:   - Consider transfer to S/Mindful Care unit (does not need 1:1 there)  - continue 1:1 if on RNF for impulsivity  - Medical optimization as you are  - Hold antipsychotics/Zyprexa for now given prolonged QTc  - Avoid benzos/Xanax (deliriogenic)  - Avoid anticholinergic/antihistaminic agents (deliriogenic)  - Monitor QTc daily; keep Mg above 2 and K above 4  - Start Depakote 250mg PO vs IV Q 8 standing. Does not have capacity to refuse  - Does not have capacity to leave AMA  - Collateral pending  - Dispo: TBD, though unlikely to require inpt psych.   Will continue to assess

## 2024-07-23 NOTE — PROGRESS NOTE ADULT - SUBJECTIVE AND OBJECTIVE BOX
Dr. Beatrice Weldon  Pager [phone]    PROGRESS NOTE:     Patient is a 88y old  Male who presents with a chief complaint of agitation (2024 23:17)      SUBJECTIVE / OVERNIGHT EVENTS: denies chest pain or sob   ADDITIONAL REVIEW OF SYSTEMS: afebrile , was agitated earlier per staff     MEDICATIONS  (STANDING):  aMIOdarone    Tablet 100 milliGRAM(s) Oral daily  apixaban 2.5 milliGRAM(s) Oral two times a day  aspirin  chewable 81 milliGRAM(s) Oral daily  atorvastatin 20 milliGRAM(s) Oral at bedtime  melatonin 3 milliGRAM(s) Oral at bedtime  metoprolol succinate ER 25 milliGRAM(s) Oral daily  midodrine. 10 milliGRAM(s) Oral <User Schedule>  sodium chloride 0.9%. 1000 milliLiter(s) (75 mL/Hr) IV Continuous <Continuous>  thiamine IVPB 500 milliGRAM(s) IV Intermittent daily    MEDICATIONS  (PRN):  acetaminophen     Tablet .. 650 milliGRAM(s) Oral every 6 hours PRN Temp greater or equal to 38C (100.4F), Mild Pain (1 - 3)  ALPRAZolam 1 milliGRAM(s) Oral two times a day PRN for anxiety      CAPILLARY BLOOD GLUCOSE        I&O's Summary      PHYSICAL EXAM:  Vital Signs Last 24 Hrs  T(C): 36.4 (2024 10:00), Max: 36.8 (2024 19:26)  T(F): 97.5 (2024 10:00), Max: 98.2 (2024 19:26)  HR: 80 (2024 10:00) (78 - 84)  BP: 120/71 (2024 10:00) (120/71 - 154/78)  BP(mean): --  RR: 17 (2024 10:00) (14 - 22)  SpO2: 98% (2024 10:00) (98% - 100%)    Parameters below as of 2024 10:00  Patient On (Oxygen Delivery Method): room air      CONSTITUTIONAL: afebrile   RESPIRATORY: Normal respiratory effort; lungs are clear to auscultation bilaterally  CARDIOVASCULAR: Regular rate and rhythm, normal S1 and S2, no murmur/rub/gallop; No lower extremity edema; Peripheral pulses are 2+ bilaterally  ABDOMEN: Nontender to palpation, normoactive bowel sounds, no rebound/guarding; No hepatosplenomegaly  MUSCULOSKELETAL: no clubbing or cyanosis of digits; no joint swelling or tenderness to palpation  PSYCH: A+O x1, dementia     LABS:                        12.3   7.07  )-----------( 246      ( 2024 14:37 )             37.9         139  |  105  |  40<H>  ----------------------------<  87  4.2   |  21<L>  |  1.68<H>    Ca    9.2      2024 14:37  Mg     2.20         TPro  6.8  /  Alb  3.6  /  TBili  0.4  /  DBili  x   /  AST  15  /  ALT  7   /  AlkPhos  85            Urinalysis Basic - ( 2024 22:06 )    Color: Yellow / Appearance: Clear / S.015 / pH: x  Gluc: x / Ketone: Negative mg/dL  / Bili: Negative / Urobili: 1.0 mg/dL   Blood: x / Protein: Negative mg/dL / Nitrite: Negative   Leuk Esterase: Negative / RBC: x / WBC x   Sq Epi: x / Non Sq Epi: x / Bacteria: x        Urinalysis with Rflx Culture (collected 2024 22:06)        RADIOLOGY & ADDITIONAL TESTS:  Results Reviewed:   Imaging Personally Reviewed:  < from: CT Head No Cont (24 @ 19:59) >  IMPRESSION:    Anterior communicating artery aneurysm coil embolization. No acute   intracranial hemorrhage.    Chronic left frontal infarction.  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  Advanced chronic microvascular ischemic changes.    < from: Xray Chest 1 View- PORTABLE-Urgent (Xray Chest 1 View- PORTABLE-Urgent .) (24 @ 20:21) >  IMPRESSION:  Clear lungs.        Electrocardiogram Personally Reviewed:    COORDINATION OF CARE:  Care Discussed with Consultants/Other Providers [Y/N]:  Prior or Outpatient Records Reviewed [Y/N]:

## 2024-07-23 NOTE — ED ADULT NURSE REASSESSMENT NOTE - NS ED NURSE REASSESS COMMENT FT1
pt remains on 1:1 at this time, PCA Mariam at bedside. respirations even and unlabored. no acute distress noted. resting comfortably in stretcher at this time. remains on continuous monitor, NSR noted. awaiting bed assignment. safety maintained side rails up

## 2024-07-23 NOTE — PROGRESS NOTE ADULT - PROBLEM SELECTOR PLAN 5
DVT prophylaxis: eliquis  Diet: dash/tlc    Copy of MOLST form in chart. DNR/DNI, DVT prophylaxis: eliquis  Diet: dash/tlc    Copy of MOLST form in chart. DNR/DNI, verify with family
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
## 2024-07-23 NOTE — BH CONSULTATION LIAISON ASSESSMENT NOTE - MSE UNSTRUCTURED FT
On exam, pt is in hallway, resting. Aid says he was disorganized/restless earlier. Oriented x 1/self, otherwise says he is at a [employer] and it is February. Irritable but not combative. Denies any complaints, just wants to "go" but does not elaborate where. Confabulating, disorganized. Poor insight. No focal neuro deficits appreciated.

## 2024-07-23 NOTE — ED ADULT NURSE REASSESSMENT NOTE - NS ED NURSE REASSESS COMMENT FT1
Received patient in 3A, admitted to medicine awaiting bed assignment. Patient is on 1:1 observation as per MD's order, PCA at bedside for evaluation. Patient is asleep in stretcher, no distress noted. Patient is on cardiac monitor sinus tachycardia w/O2 sat 98% on a room air. Patient is A&OX0, airway patent, breathing unlabored and even, radial pulses palpable, abdomen soft, nontender. Side rails up and safety maintained. Fall precaution in place.

## 2024-07-24 DIAGNOSIS — R41.0 DISORIENTATION, UNSPECIFIED: ICD-10-CM

## 2024-07-24 LAB
ANION GAP SERPL CALC-SCNC: 13 MMOL/L — SIGNIFICANT CHANGE UP (ref 7–14)
BASOPHILS # BLD AUTO: 0.1 K/UL — SIGNIFICANT CHANGE UP (ref 0–0.2)
BASOPHILS NFR BLD AUTO: 1.2 % — SIGNIFICANT CHANGE UP (ref 0–2)
BUN SERPL-MCNC: 26 MG/DL — HIGH (ref 7–23)
CALCIUM SERPL-MCNC: 9.6 MG/DL — SIGNIFICANT CHANGE UP (ref 8.4–10.5)
CHLORIDE SERPL-SCNC: 105 MMOL/L — SIGNIFICANT CHANGE UP (ref 98–107)
CO2 SERPL-SCNC: 23 MMOL/L — SIGNIFICANT CHANGE UP (ref 22–31)
CREAT SERPL-MCNC: 1.34 MG/DL — HIGH (ref 0.5–1.3)
CRP SERPL-MCNC: 12.4 MG/L — HIGH
EGFR: 51 ML/MIN/1.73M2 — LOW
EOSINOPHIL # BLD AUTO: 0.52 K/UL — HIGH (ref 0–0.5)
EOSINOPHIL NFR BLD AUTO: 6.1 % — HIGH (ref 0–6)
FOLATE SERPL-MCNC: 11 NG/ML — SIGNIFICANT CHANGE UP (ref 3.1–17.5)
GLUCOSE SERPL-MCNC: 95 MG/DL — SIGNIFICANT CHANGE UP (ref 70–99)
HCT VFR BLD CALC: 40.7 % — SIGNIFICANT CHANGE UP (ref 39–50)
HGB BLD-MCNC: 12.8 G/DL — LOW (ref 13–17)
IANC: 4.5 K/UL — SIGNIFICANT CHANGE UP (ref 1.8–7.4)
IMM GRANULOCYTES NFR BLD AUTO: 0.2 % — SIGNIFICANT CHANGE UP (ref 0–0.9)
LYMPHOCYTES # BLD AUTO: 2.6 K/UL — SIGNIFICANT CHANGE UP (ref 1–3.3)
LYMPHOCYTES # BLD AUTO: 30.6 % — SIGNIFICANT CHANGE UP (ref 13–44)
MAGNESIUM SERPL-MCNC: 2.1 MG/DL — SIGNIFICANT CHANGE UP (ref 1.6–2.6)
MCHC RBC-ENTMCNC: 29 PG — SIGNIFICANT CHANGE UP (ref 27–34)
MCHC RBC-ENTMCNC: 31.4 GM/DL — LOW (ref 32–36)
MCV RBC AUTO: 92.3 FL — SIGNIFICANT CHANGE UP (ref 80–100)
MONOCYTES # BLD AUTO: 0.77 K/UL — SIGNIFICANT CHANGE UP (ref 0–0.9)
MONOCYTES NFR BLD AUTO: 9 % — SIGNIFICANT CHANGE UP (ref 2–14)
NEUTROPHILS # BLD AUTO: 4.5 K/UL — SIGNIFICANT CHANGE UP (ref 1.8–7.4)
NEUTROPHILS NFR BLD AUTO: 52.9 % — SIGNIFICANT CHANGE UP (ref 43–77)
NRBC # BLD: 0 /100 WBCS — SIGNIFICANT CHANGE UP (ref 0–0)
NRBC # FLD: 0 K/UL — SIGNIFICANT CHANGE UP (ref 0–0)
PHOSPHATE SERPL-MCNC: 3.3 MG/DL — SIGNIFICANT CHANGE UP (ref 2.5–4.5)
PLATELET # BLD AUTO: 275 K/UL — SIGNIFICANT CHANGE UP (ref 150–400)
POTASSIUM SERPL-MCNC: 4.3 MMOL/L — SIGNIFICANT CHANGE UP (ref 3.5–5.3)
POTASSIUM SERPL-SCNC: 4.3 MMOL/L — SIGNIFICANT CHANGE UP (ref 3.5–5.3)
RBC # BLD: 4.41 M/UL — SIGNIFICANT CHANGE UP (ref 4.2–5.8)
RBC # FLD: 14.1 % — SIGNIFICANT CHANGE UP (ref 10.3–14.5)
SODIUM SERPL-SCNC: 141 MMOL/L — SIGNIFICANT CHANGE UP (ref 135–145)
TSH SERPL-MCNC: 13.2 UIU/ML — HIGH (ref 0.27–4.2)
VIT B12 SERPL-MCNC: 727 PG/ML — SIGNIFICANT CHANGE UP (ref 200–900)
WBC # BLD: 8.51 K/UL — SIGNIFICANT CHANGE UP (ref 3.8–10.5)
WBC # FLD AUTO: 8.51 K/UL — SIGNIFICANT CHANGE UP (ref 3.8–10.5)

## 2024-07-24 PROCEDURE — 99232 SBSQ HOSP IP/OBS MODERATE 35: CPT

## 2024-07-24 PROCEDURE — 99497 ADVNCD CARE PLAN 30 MIN: CPT

## 2024-07-24 PROCEDURE — 99233 SBSQ HOSP IP/OBS HIGH 50: CPT

## 2024-07-24 RX ADMIN — Medication 3 MILLIGRAM(S): at 21:00

## 2024-07-24 RX ADMIN — Medication 81 MILLIGRAM(S): at 12:16

## 2024-07-24 RX ADMIN — Medication 1 MILLIGRAM(S): at 21:00

## 2024-07-24 RX ADMIN — Medication 105 MILLIGRAM(S): at 12:16

## 2024-07-24 RX ADMIN — DIVALPROEX SODIUM 250 MILLIGRAM(S): 125 CAPSULE, COATED PELLETS ORAL at 15:37

## 2024-07-24 RX ADMIN — APIXABAN 2.5 MILLIGRAM(S): 5 TABLET, FILM COATED ORAL at 18:13

## 2024-07-24 RX ADMIN — MIDODRINE HYDROCHLORIDE 10 MILLIGRAM(S): 2.5 TABLET ORAL at 18:05

## 2024-07-24 RX ADMIN — Medication 25 MILLIGRAM(S): at 05:56

## 2024-07-24 RX ADMIN — DIVALPROEX SODIUM 250 MILLIGRAM(S): 125 CAPSULE, COATED PELLETS ORAL at 21:00

## 2024-07-24 RX ADMIN — AMIODARONE HYDROCHLORIDE 100 MILLIGRAM(S): 50 INJECTION, SOLUTION INTRAVENOUS at 05:58

## 2024-07-24 RX ADMIN — DIVALPROEX SODIUM 250 MILLIGRAM(S): 125 CAPSULE, COATED PELLETS ORAL at 05:57

## 2024-07-24 RX ADMIN — Medication 75 MILLILITER(S): at 12:16

## 2024-07-24 RX ADMIN — APIXABAN 2.5 MILLIGRAM(S): 5 TABLET, FILM COATED ORAL at 05:57

## 2024-07-24 RX ADMIN — ATORVASTATIN CALCIUM 20 MILLIGRAM(S): 40 TABLET, FILM COATED ORAL at 21:00

## 2024-07-24 NOTE — DIETITIAN INITIAL EVALUATION ADULT - REASON FOR ADMISSION
Dementia with other behavioral disturbance  Per chart review, Pt is an 89 yo M, hx of A/ fib on Eliquis, HTN, CHF, pacemaker, bladder neoplasm, recently started on Xanax for anxiety last week, dementia AOx0 at baseline, presents for increased agitation. Pt admitted for further management.

## 2024-07-24 NOTE — PROGRESS NOTE ADULT - SUBJECTIVE AND OBJECTIVE BOX
Madi Pinzon MD  [employer]  Pager [phone]  Email: [email or web address]  Available on [employer] Teams        PROGRESS NOTE:     Patient is a 88y old  Male who presents with a chief complaint of agitation (23 Jul 2024 11:39)      SUBJECTIVE / OVERNIGHT EVENTS:  Patient seen and examined this morning. States that he is feeling "much better"  ADDITIONAL REVIEW OF SYSTEMS:  No f/c/n/v    MEDICATIONS  (STANDING):  aMIOdarone    Tablet 100 milliGRAM(s) Oral daily  apixaban 2.5 milliGRAM(s) Oral two times a day  aspirin  chewable 81 milliGRAM(s) Oral daily  atorvastatin 20 milliGRAM(s) Oral at bedtime  divalproex  milliGRAM(s) Oral every 8 hours  melatonin 3 milliGRAM(s) Oral at bedtime  metoprolol succinate ER 25 milliGRAM(s) Oral daily  midodrine. 10 milliGRAM(s) Oral <User Schedule>  sodium chloride 0.9%. 1000 milliLiter(s) (75 mL/Hr) IV Continuous <Continuous>  thiamine IVPB 500 milliGRAM(s) IV Intermittent daily    MEDICATIONS  (PRN):  acetaminophen     Tablet .. 650 milliGRAM(s) Oral every 6 hours PRN Temp greater or equal to 38C (100.4F), Mild Pain (1 - 3)  ALPRAZolam 1 milliGRAM(s) Oral two times a day PRN for anxiety      CAPILLARY BLOOD GLUCOSE        I&O's Summary      PHYSICAL EXAM:  Vital Signs Last 24 Hrs  T(C): 36.4 (24 Jul 2024 11:50), Max: 36.6 (23 Jul 2024 18:19)  T(F): 97.5 (24 Jul 2024 11:50), Max: 97.9 (23 Jul 2024 18:19)  HR: 80 (24 Jul 2024 11:50) (80 - 102)  BP: 142/79 (24 Jul 2024 11:50) (142/79 - 154/86)  BP(mean): --  RR: 18 (24 Jul 2024 11:50) (17 - 18)  SpO2: 100% (24 Jul 2024 11:50) (98% - 100%)    Parameters below as of 24 Jul 2024 11:50  Patient On (Oxygen Delivery Method): room air        CONSTITUTIONAL: NAD, pleasant  RESPIRATORY: Normal respiratory effort; no respiratory distress, CTAB  CARDIOVASCULAR: No visible JVD, No lower extremity edema; S1S2, no m,r,g  ABDOMEN: Not guarding, does not appear distended, BS+  MUSCLOSKELETAL: no clubbing or cyanosis of digits; no joint swelling   PSYCH: Talkative but very confused    LABS:                        12.8   8.51  )-----------( 275      ( 24 Jul 2024 05:50 )             40.7     07-24    141  |  105  |  26<H>  ----------------------------<  95  4.3   |  23  |  1.34<H>    Ca    9.6      24 Jul 2024 05:50  Phos  3.3     07-24  Mg     2.10     07-24    TPro  6.8  /  Alb  3.6  /  TBili  0.4  /  DBili  x   /  AST  15  /  ALT  7   /  AlkPhos  85  07-22          Urinalysis Basic - ( 24 Jul 2024 05:50 )    Color: x / Appearance: x / SG: x / pH: x  Gluc: 95 mg/dL / Ketone: x  / Bili: x / Urobili: x   Blood: x / Protein: x / Nitrite: x   Leuk Esterase: x / RBC: x / WBC x   Sq Epi: x / Non Sq Epi: x / Bacteria: x        Urinalysis with Rflx Culture (collected 22 Jul 2024 22:06)        RADIOLOGY & ADDITIONAL TESTS:  Results Reviewed:   Imaging Personally Reviewed:  Electrocardiogram Personally Reviewed:    COORDINATION OF CARE:  Care Discussed with Consultants/Other Providers [Y/N]: Case discussed during interdisciplinary rounds with social work and case management. D/W Dr. Pedro  Prior or Outpatient Records Reviewed [Y/N]:

## 2024-07-24 NOTE — DIETITIAN INITIAL EVALUATION ADULT - PROBLEM SELECTOR PLAN 1
Pt presents with worsening agitation, possibly in setting of worsening dementia. current toxic/metabolic/infectious work up negative. S/p IV haldol 2.5 mg x2 and IV ativan 2 mg x1.   - psych eval in AM   - QTc 526, correct QTc 507, will hold olanzapine for now and f/u AM EKG.  - c/w CO

## 2024-07-24 NOTE — PROGRESS NOTE ADULT - SUBJECTIVE AND OBJECTIVE BOX
Patient is a 88y old  Male who presents with a chief complaint of Dementia with other behavioral disturbance  Per chart review, Pt is an 89 yo M, hx of A/ fib on Eliquis, HTN, CHF, pacemaker, bladder neoplasm, recently started on Xanax for anxiety last week, dementia AOx0 at baseline, presents for increased agitation. Pt admitted for further management.        (24 Jul 2024 16:50)      HPI:  Pt is an 89 yo M, hx of A/ fib on Eliquis, HTN, CHF, pacemaker, bladder neoplasm, recently started on Xanax for anxiety last week, dementia AOx0 at baseline, presents for increased agitation. Unable to obtain hx from pt given dementia and also sedated from medications administered in ED. Per ED provider note, pt with increased agitation to residents and staff at rehab facility and we sent to ED. Pt had received IV versed after punching EMS. In ED, pt was given IV haldol 2.5 mg x2 and IV ativan 2 mg x1. Patient also has an ulcer of his right second that started about three months ago. Patient is unsure of how he sustained the ulcer.     In ED, vitals T 97.9, HR 80, /76, RR 14, O2 sat 100% on RA  Labs significant for: BUN/Cr 40/1.68 at baseline  EKG personally reviewed: AV paced, QTc 526  CXR:   Imaging: CTH: IMPRESSION: Anterior communicating artery aneurysm coil embolization. No acute intracranial hemorrhage.  Chronic left frontal infarction.  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  Advanced chronic microvascular ischemic changes.    ED management: IV haldol 2.5 mg x2, ativan 2 mg x1 (22 Jul 2024 23:17)      PAST MEDICAL & SURGICAL HISTORY:  Bacterial UTI      Atrial fibrillation      HTN (hypertension)      Dementia          MEDICATIONS  (STANDING):  aMIOdarone    Tablet 100 milliGRAM(s) Oral daily  apixaban 2.5 milliGRAM(s) Oral two times a day  aspirin  chewable 81 milliGRAM(s) Oral daily  atorvastatin 20 milliGRAM(s) Oral at bedtime  divalproex  milliGRAM(s) Oral every 8 hours  melatonin 3 milliGRAM(s) Oral at bedtime  metoprolol succinate ER 25 milliGRAM(s) Oral daily  midodrine. 10 milliGRAM(s) Oral <User Schedule>  sodium chloride 0.9%. 1000 milliLiter(s) (75 mL/Hr) IV Continuous <Continuous>  thiamine IVPB 500 milliGRAM(s) IV Intermittent daily    MEDICATIONS  (PRN):  acetaminophen     Tablet .. 650 milliGRAM(s) Oral every 6 hours PRN Temp greater or equal to 38C (100.4F), Mild Pain (1 - 3)  ALPRAZolam 1 milliGRAM(s) Oral two times a day PRN for anxiety      Allergies    No Known Allergies    Intolerances        VITALS:    Vital Signs Last 24 Hrs  T(C): 36.4 (24 Jul 2024 11:50), Max: 36.4 (24 Jul 2024 05:54)  T(F): 97.5 (24 Jul 2024 11:50), Max: 97.5 (24 Jul 2024 05:54)  HR: 80 (24 Jul 2024 11:50) (80 - 90)  BP: 142/79 (24 Jul 2024 11:50) (142/79 - 154/86)  BP(mean): --  RR: 18 (24 Jul 2024 11:50) (18 - 18)  SpO2: 100% (24 Jul 2024 11:50) (98% - 100%)    Parameters below as of 24 Jul 2024 11:50  Patient On (Oxygen Delivery Method): room air        LABS:                          12.8   8.51  )-----------( 275      ( 24 Jul 2024 05:50 )             40.7       07-24    141  |  105  |  26<H>  ----------------------------<  95  4.3   |  23  |  1.34<H>    Ca    9.6      24 Jul 2024 05:50  Phos  3.3     07-24  Mg     2.10     07-24        CAPILLARY BLOOD GLUCOSE              LOWER EXTREMITY PHYSICAL EXAM:    Vascular: DP/PT 2/4, B/L, CFT <3 seconds B/L, Temperature gradient  warm to cool, B/L.   Neuro: Epicritic sensation diminished to the level of digits, B/L.  Musculoskeletal/Ortho: Limited ankle ROM, Hammertoe deformity of the right 2nd and 3rd digit  Skin: Right foot second dorsal proximal interphalangeal joint wound to bone with granular wound, edema circumferentially secondary to dactylitis, localized erythema, no purulence, no malodor. Left foot no open wound and no signs of acute infection.     RADIOLOGY & ADDITIONAL STUDIES:     Patient is a 88y old  Male who presents with a chief complaint of Dementia with other behavioral disturbance  Per chart review, Pt is an 87 yo M, hx of A/ fib on Eliquis, HTN, CHF, pacemaker, bladder neoplasm, recently started on Xanax for anxiety last week, dementia AOx0 at baseline, presents for increased agitation. Pt admitted for further management.        (24 Jul 2024 16:50)      HPI:  Pt is an 87 yo M, hx of A/ fib on Eliquis, HTN, CHF, pacemaker, bladder neoplasm, recently started on Xanax for anxiety last week, dementia AOx0 at baseline, presents for increased agitation. Unable to obtain hx from pt given dementia and also sedated from medications administered in ED. Per ED provider note, pt with increased agitation to residents and staff at rehab facility and we sent to ED. Pt had received IV versed after punching EMS. In ED, pt was given IV haldol 2.5 mg x2 and IV ativan 2 mg x1. Patient also has an ulcer of his right second that started about three months ago. Patient is unsure of how he sustained the ulcer. Further HPI and ROS cannot be obtained 2/2 mental status      PAST MEDICAL & SURGICAL HISTORY:  Bacterial UTI  Atrial fibrillation  HTN (hypertension)  Dementia      MEDICATIONS  (STANDING):  aMIOdarone    Tablet 100 milliGRAM(s) Oral daily  apixaban 2.5 milliGRAM(s) Oral two times a day  aspirin  chewable 81 milliGRAM(s) Oral daily  atorvastatin 20 milliGRAM(s) Oral at bedtime  divalproex  milliGRAM(s) Oral every 8 hours  melatonin 3 milliGRAM(s) Oral at bedtime  metoprolol succinate ER 25 milliGRAM(s) Oral daily  midodrine. 10 milliGRAM(s) Oral <User Schedule>  sodium chloride 0.9%. 1000 milliLiter(s) (75 mL/Hr) IV Continuous <Continuous>  thiamine IVPB 500 milliGRAM(s) IV Intermittent daily    MEDICATIONS  (PRN):  acetaminophen     Tablet .. 650 milliGRAM(s) Oral every 6 hours PRN Temp greater or equal to 38C (100.4F), Mild Pain (1 - 3)  ALPRAZolam 1 milliGRAM(s) Oral two times a day PRN for anxiety      Allergies    No Known Allergies    Intolerances        VITALS:    Vital Signs Last 24 Hrs  T(C): 36.4 (24 Jul 2024 11:50), Max: 36.4 (24 Jul 2024 05:54)  T(F): 97.5 (24 Jul 2024 11:50), Max: 97.5 (24 Jul 2024 05:54)  HR: 80 (24 Jul 2024 11:50) (80 - 90)  BP: 142/79 (24 Jul 2024 11:50) (142/79 - 154/86)  BP(mean): --  RR: 18 (24 Jul 2024 11:50) (18 - 18)  SpO2: 100% (24 Jul 2024 11:50) (98% - 100%)    Parameters below as of 24 Jul 2024 11:50  Patient On (Oxygen Delivery Method): room air        LABS:                          12.8   8.51  )-----------( 275      ( 24 Jul 2024 05:50 )             40.7       07-24    141  |  105  |  26<H>  ----------------------------<  95  4.3   |  23  |  1.34<H>    Ca    9.6      24 Jul 2024 05:50  Phos  3.3     07-24  Mg     2.10     07-24        CAPILLARY BLOOD GLUCOSE              LOWER EXTREMITY PHYSICAL EXAM:    Vascular: DP/PT 2/4, B/L, CFT <3 seconds B/L, Temperature gradient  warm to cool, B/L.   Neuro: Epicritic sensation diminished to the level of digits, B/L.  Musculoskeletal/Ortho: Limited ankle ROM, Hammertoe deformity of the right 2nd and 3rd digit  Skin: Right foot second dorsal proximal interphalangeal joint wound to bone and into the PIPJ with granular wound borders, edema circumferentially secondary to dactylitis, localized erythema, no purulence, no malodor/ fluctuance/ soft tissue crepitus/ necrosis.   Left foot no open wound and no signs of acute infection.     RADIOLOGY & ADDITIONAL STUDIES:

## 2024-07-24 NOTE — PROGRESS NOTE ADULT - PROBLEM SELECTOR PLAN 1
Pt presents with worsening agitation, acute encephalopathy superimposed on chronic dementia, possibly in setting of worsening dementia. current toxic/metabolic/infectious work up negative. S/p IV haldol 2.5 mg x2 and IV ativan 2 mg x1.   - called psych consult, fu recs   - avoid benzo/sedatives  - urine tox benzo  - CTH Anterior communicating artery aneurysm coil embolization. No acute   intracranial hemorrhage. Chronic left frontal infarction.  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  Advanced chronic microvascular ischemic changes.  - QTc 526, correct QTc 507, will hold olanzapine for now , f/up repeat ECG   - IV thiamine  - c/w ASA/statin for hx of CVA  - IVF hydration for mild azotemia BUN/Cr 40/1.68, unclear about baseline Cr   - c/w CO for intermittent agitation

## 2024-07-24 NOTE — DIETITIAN INITIAL EVALUATION ADULT - PROBLEM SELECTOR PLAN 5
DVT prophylaxis: eliquis  Diet: dash/tlc    Copy of MOLST form in chart. DNR/DNI, verify w/ family in AM.
no
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
## 2024-07-24 NOTE — PATIENT PROFILE ADULT - FUNCTIONAL ASSESSMENT - BASIC MOBILITY 6.
1-calculated by average/Not able to assess (calculate score using [facility] averaging method)

## 2024-07-24 NOTE — PROGRESS NOTE ADULT - ASSESSMENT
Pt is an 89 yo M, hx of A/ fib on Eliquis, HTN, CHF, pacemaker, bladder neoplasm, recently started on Xanax for anxiety last week, dementia AOx0 at baseline, presents for increased agitation. Pt admitted for further management.

## 2024-07-24 NOTE — DIETITIAN INITIAL EVALUATION ADULT - ORAL INTAKE PTA/DIET HISTORY
Patient is A&Ox 1 at baseline, unable to provide meaningful information with periodically confusion during the conversation. Patient was transferred from [facility]. Per chart review, Patient was on heart healthy diet, regular consistency, and regular liquids. NKFA noted. Last recorded weight 179.6 lbs on 6/6/24 as indicated.  Patient stated he weighed 200 lbs and height as 72 inch.    [facility] weight:   7/15/24 - 81.6kg  1/12/24- 79.4kg

## 2024-07-24 NOTE — DIETITIAN INITIAL EVALUATION ADULT - OTHER INFO
Patient seen in his room with calm mood. Patient is receiving a DASH/TLC diet order. Per discussion with unit RN, patient was fed by nursing staff this morning, reports adequate PO intake with good appetite. No observed difficulty chewing or swallowing. No nausea, vomit, diarrhea, constipation; no BM reported per RN flowsheets. No edema, no pressure injury + multiple arterial wounds per RN flowsheets. 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 Dosing weight @ 79.9kg (7/23), BMI 28.4-suggesting overweight status. Noted on thiamin for micronutrient support. Patient is not a good candidate for education due to impaired cognition.

## 2024-07-24 NOTE — PROGRESS NOTE ADULT - PROBLEM SELECTOR PLAN 5
DVT prophylaxis: eliquis  Diet: dash/tlc    I attempted to call both numbers in chart  - told it was the wrong number, I also called the other number  and there was no answer, nor an answering machine. Unable to verify GOC/ DNR/DNI. DVT prophylaxis: eliquis  Diet: dash/tlc    I attempted to call both numbers in chart  - told it was the wrong number, I also called the other number  and there was no answer, nor an answering machine. Unable to verify GOC/ DNR/DNI. But later, wife was at bedside and GOC discussed in detail. Patient DNR/DNI.

## 2024-07-24 NOTE — PATIENT PROFILE ADULT - FALL HARM RISK - HARM RISK INTERVENTIONS
Assistance with ambulation/Assistance OOB with selected safe patient handling equipment/Communicate Risk of Fall with Harm to all staff/Monitor for mental status changes/Move patient closer to nurses' station/Reinforce activity limits and safety measures with patient and family/Reorient to person, place and time as needed/Tailored Fall Risk Interventions/Toileting schedule using arm’s reach rule for commode and bathroom/Use of alarms - bed, chair and/or voice tab/Visual Cue: Yellow wristband and red socks/Bed in lowest position, wheels locked, appropriate side rails in place/Call bell, personal items and telephone in reach/Instruct patient to call for assistance before getting out of bed or chair/Non-slip footwear when patient is out of bed/[place] to call system/Physically safe environment - no spills, clutter or unnecessary equipment/Purposeful Proactive Rounding/Room/bathroom lighting operational, light cord in reach

## 2024-07-24 NOTE — DIETITIAN INITIAL EVALUATION ADULT - ADD RECOMMEND
1. Continue PO diet of DASH/TLC at this time  2. Nursing to document PO in RN flow sheets and monitor weekly weights.   3. Continue to monitor skin integrity, bowel regimen, and nutrition pertinent labs.

## 2024-07-24 NOTE — PROGRESS NOTE ADULT - ASSESSMENT
88M presents with right foot second digit dorsal wound  - Patient was evaluated and seen  - Afebrile, WBC 8.51, ESR/CRP (pending)  -  Right foot second dorsal proximal interphalangeal joint wound to bone with granular wound, edema circumferentially secondary to dactylitis, localized erythema, no purulence, no malodor. Left foot no open wound and no signs of acute infection.   - Bilateral SHERYL/PVR ordered  - Right foot wound cultured  - Recommend ID consult  - Podiatry plan: Local wound care pending X-ray, SHERYL/PVR, cultures, ID rec  - Discussed with Attending 88M presents with right foot second digit dorsal wound into the joint, cellulitis  - Patient was evaluated and seen  - Afebrile, WBC 8.51, ESR/CRP (pending)  - Bilateral SHERYL/PVR ordered  - Right foot wound cultured  - Recommend ID consult, IV abx  - Podiatry plan: Local wound care pending X-ray, SHERYL/PVR, cultures, ID rec  - Discussed with Attending
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
## 2024-07-24 NOTE — DIETITIAN INITIAL EVALUATION ADULT - PERTINENT MEDS FT
MEDICATIONS  (STANDING):  aMIOdarone    Tablet 100 milliGRAM(s) Oral daily  apixaban 2.5 milliGRAM(s) Oral two times a day  aspirin  chewable 81 milliGRAM(s) Oral daily  atorvastatin 20 milliGRAM(s) Oral at bedtime  divalproex  milliGRAM(s) Oral every 8 hours  melatonin 3 milliGRAM(s) Oral at bedtime  metoprolol succinate ER 25 milliGRAM(s) Oral daily  midodrine. 10 milliGRAM(s) Oral <User Schedule>  sodium chloride 0.9%. 1000 milliLiter(s) (75 mL/Hr) IV Continuous <Continuous>  thiamine IVPB 500 milliGRAM(s) IV Intermittent daily    MEDICATIONS  (PRN):  acetaminophen     Tablet .. 650 milliGRAM(s) Oral every 6 hours PRN Temp greater or equal to 38C (100.4F), Mild Pain (1 - 3)  ALPRAZolam 1 milliGRAM(s) Oral two times a day PRN for anxiety

## 2024-07-24 NOTE — DIETITIAN INITIAL EVALUATION ADULT - PERTINENT LABORATORY DATA
07-24    141  |  105  |  26<H>  ----------------------------<  95  4.3   |  23  |  1.34<H>    Ca    9.6      24 Jul 2024 05:50  Phos  3.3     07-24  Mg     2.10     07-24

## 2024-07-24 NOTE — PATIENT PROFILE ADULT - FALL HARM RISK - DEVICES
Refill done.  Silvino Llanos M.D.    
Was the patient seen in the last year in this department? Yes     Does patient have an active prescription for medications requested? No     Received Request Via: Pharmacy      
None

## 2024-07-24 NOTE — PROGRESS NOTE ADULT - CONVERSATION DETAILS
Patient's care was discussed prior MOLST was reviewed. Wife Debby Chang was at bedside. She would like to keep the patient DNR/DNI, use antibiotics and IV fluids if needed. But she does not want any other aggressive interventions like feeding tube. She is concerned that the patient was kicked out of the dementia unit in the nursing home.

## 2024-07-25 LAB
ERYTHROCYTE [SEDIMENTATION RATE] IN BLOOD: 15 MM/HR — SIGNIFICANT CHANGE UP (ref 1–15)
GRAM STN FLD: SIGNIFICANT CHANGE UP
SPECIMEN SOURCE: SIGNIFICANT CHANGE UP

## 2024-07-25 PROCEDURE — 99222 1ST HOSP IP/OBS MODERATE 55: CPT

## 2024-07-25 PROCEDURE — 93010 ELECTROCARDIOGRAM REPORT: CPT

## 2024-07-25 PROCEDURE — 99231 SBSQ HOSP IP/OBS SF/LOW 25: CPT

## 2024-07-25 PROCEDURE — 99232 SBSQ HOSP IP/OBS MODERATE 35: CPT

## 2024-07-25 PROCEDURE — 73630 X-RAY EXAM OF FOOT: CPT | Mod: 26,RT

## 2024-07-25 RX ORDER — MUPIROCIN CALCIUM 20 MG/G
1 CREAM TOPICAL
Refills: 0 | Status: DISCONTINUED | OUTPATIENT
Start: 2024-07-25 | End: 2024-08-06

## 2024-07-25 RX ADMIN — Medication 3 MILLIGRAM(S): at 21:20

## 2024-07-25 RX ADMIN — AMIODARONE HYDROCHLORIDE 100 MILLIGRAM(S): 50 INJECTION, SOLUTION INTRAVENOUS at 05:07

## 2024-07-25 RX ADMIN — Medication 75 MILLILITER(S): at 02:54

## 2024-07-25 RX ADMIN — Medication 75 MILLILITER(S): at 18:34

## 2024-07-25 RX ADMIN — Medication 25 MILLIGRAM(S): at 05:06

## 2024-07-25 RX ADMIN — DIVALPROEX SODIUM 250 MILLIGRAM(S): 125 CAPSULE, COATED PELLETS ORAL at 05:06

## 2024-07-25 RX ADMIN — ATORVASTATIN CALCIUM 20 MILLIGRAM(S): 40 TABLET, FILM COATED ORAL at 21:20

## 2024-07-25 RX ADMIN — Medication 105 MILLIGRAM(S): at 14:35

## 2024-07-25 RX ADMIN — APIXABAN 2.5 MILLIGRAM(S): 5 TABLET, FILM COATED ORAL at 17:45

## 2024-07-25 RX ADMIN — MIDODRINE HYDROCHLORIDE 10 MILLIGRAM(S): 2.5 TABLET ORAL at 17:45

## 2024-07-25 RX ADMIN — Medication 81 MILLIGRAM(S): at 14:36

## 2024-07-25 RX ADMIN — DIVALPROEX SODIUM 250 MILLIGRAM(S): 125 CAPSULE, COATED PELLETS ORAL at 14:37

## 2024-07-25 RX ADMIN — APIXABAN 2.5 MILLIGRAM(S): 5 TABLET, FILM COATED ORAL at 05:06

## 2024-07-25 RX ADMIN — DIVALPROEX SODIUM 250 MILLIGRAM(S): 125 CAPSULE, COATED PELLETS ORAL at 21:20

## 2024-07-25 NOTE — PHYSICAL THERAPY INITIAL EVALUATION ADULT - ADDITIONAL COMMENTS
Pt poor historian, history gathered from care coordination. Pt lives in long term care, was previously in rehab in December 2023 but transitioned to long term care, while in rehab pt was not participating in PT sessions. Pt is currently wheelchair/bed bound, needs assistance for ADLs. Previously pt was ambulating with use of a rolling walker for long distances.    Following evaluation, pt was left semireclined in bed in no distress, all lines in tact, call bell in reach.

## 2024-07-25 NOTE — PHYSICAL THERAPY INITIAL EVALUATION ADULT - PERTINENT HX OF CURRENT PROBLEM, REHAB EVAL
88 year old male presents with agitation. 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 No acute intracranial hemorrhage.

## 2024-07-25 NOTE — DISCHARGE NOTE PROVIDER - CARE PROVIDER_API CALL
[facility]  Foot Surgery  [address] [place] [address]  Phone: [phone]  Fax: [phone]  Follow Up Time: 1 month    Tracy Johnson  Internal Medicine  [address] [place] [id number]  Phone: [phone]  Fax: [phone]  Follow Up Time: 2 weeks

## 2024-07-25 NOTE — PROGRESS NOTE ADULT - ASSESSMENT
88M presents with right foot second digit dorsal wound to bone   - Patient was evaluated and seen  - Afebrile, no leukocytosis   - Right foot second dorsal proximal interphalangeal joint wound to bone with granular wound, edema circumferentially secondary to dactylitis, localized erythema, no purulence, no malodor. Left foot no open wound and no signs of acute infection.   - Right foot xrays: 2nd digit proximal phalanx and distal phalanx erosion (resident read)   - Bilateral SHERYL/PVR pending   - Right foot wound culture pending   - Recommend ID consult  - Podiatry plan: right foot partial 2nd digit ray resection pending pt decision/ ID recs  - Please document medical clearance for possible podiatric procedure   - Discussed with Attending     88M presents with right foot second digit dorsal wound into the joint, cellulitis  - Patient was evaluated and seen  - Afebrile, no leukocytosis   - Right foot xrays: 2nd digit proximal phalanx and distal phalanx erosion (resident read)   - Bilateral SHERYL/PVR pending   - Right foot wound culture pending   - Recommend ID consult, IV abx  - Podiatry plan: high likelyhood of OM due to depth of the wound and surrounding cellulitis, please obtain MRI to confirm. Long term abx vs right foot 2nd digit resection/ athroplasty pending family GOC / ID recs  - Discussed with Attending     88M presents with right foot second digit dorsal wound into the joint, cellulitis  - Patient was evaluated and seen  - Afebrile, no leukocytosis   - Right foot xrays: 2nd digit proximal phalanx and distal phalanx erosion (resident read)   - Bilateral SHERYL/PVR pending   - Right foot wound culture pending   - Recommend ID consult, IV abx  - Podiatry plan: high likelyhood of OM due to depth of the wound and surrounding cellulitis, please obtain MRI to confirm. Long term abx vs right foot 2nd digit resection/ athroplasty pending family GOC / ID recs  - Seen with Attending.

## 2024-07-25 NOTE — DISCHARGE NOTE PROVIDER - HOSPITAL COURSE
Pt is an 87 yo M, hx of A/ fib on Eliquis on Amiodarone, HTN, CHF, pacemaker, bladder neoplasm, recently started on Xanax for anxiety last week, dementia AOx0 at baseline, presents for increased agitation. Pt admitted for further management.     Corrected qtc 562 on 7/25, as per psychiatrist, patient requires escalation of care with antipsychotics. Cardiology consulted. 88M dementia, HTN, CHF nos, CKD3, afib on Eliquis, PPM, bladder neoplasm, recently started on Xanax for anxiety last week presents for increased agitation and R foot wound.       Wound of right foot:  ESR 15, CRP 12, seen by podiatry, SHERYL/PVR performed, could not tolerate MRI, vascular saw rec angiogram not in line with GOC same with bone bx via OR, local wound care was performed and continued on IV abx while for MSSA in culture with plan for keflex on dc.       Dementia with agitation, CTH no acute pathology.  Seen by psych treatment with risperidone 0.5/1 mg + Depakote 250 mg TID      Chronic atrial fibrillation on Eliquis 2.5 mg BID (unclear why renally dosed), amiodarone 100 mg daily, metoprolol 25 mg ER daily  - corrected QTC 7/29 apx 464    TSH 13 with free T4 in middle of normal range, repeat TFTs given free T4 wnl in 2-4 wks.    History of CVA (cerebrovascular accident).   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 Advanced chronic microvascular ischemic changes.  - on asa 81 mg and atorvastatin 20 mg.    DNR/DNI  from LTC/NH, needs new facility as per CM     88M dementia, HTN, CHF nos, CKD3, afib on Eliquis, PPM, bladder neoplasm, recently started on Xanax for anxiety last week presents for increased agitation and R foot wound.       Wound of right foot:  ESR 15, CRP 12, seen by podiatry, SHERYL/PVR performed, could not tolerate MRI, vascular saw rec angiogram not in line with GOC same with bone bx via OR, local wound care was performed and continued on IV abx while for MSSA in culture with plan for keflex on dc.       Dementia with agitation, CTH no acute pathology.  Seen by psych treatment with risperidone 0.5/1.5 mg + Depakote 250 mg TID.      Chronic atrial fibrillation on Eliquis 2.5 mg BID (unclear why renally dosed), amiodarone 100 mg daily, metoprolol 25 mg ER daily    TSH 13 with free T4 in middle of normal range, repeat TFTs given free T4 wnl in 2-4 wks.    History of CVA: 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 Advanced chronic microvascular ischemic changes. On asa 81 mg and atorvastatin 20 mg.    DNR/DNI  LTC/NH     88M dementia, HTN, CHF nos, CKD3, afib on Eliquis, PPM, bladder neoplasm, recently started on Xanax for anxiety last week presents for increased agitation and R foot wound.       Wound of right foot, being treated for presumptive osteomyelitis:  ESR 15, CRP 12, seen by podiatry, SHERYL/PVR performed, could not tolerate MRI, vascular saw rec angiogram not in line with GOC same with bone bx via OR, local wound care was performed and continued on IV abx while for MSSA in culture with plan for keflex on dc.   Pt will plan for abx course for a total of 6 weeks, end date for Keflex is 10/7/24. Pt will be discharged on keflex 500mg PO q6h. should f/u with ID in 1 month's time    Dementia with agitation, CTH no acute pathology.  Seen by psych treatment with risperidone 0.5/1.5 mg + Depakote 250 mg TID.      Chronic atrial fibrillation on Eliquis 2.5 mg BID (unclear why renally dosed at time of admission had APOLINAR), amiodarone 100 mg daily, metoprolol 25 mg ER daily. If renal functions continues to remain stable, would recommend increasing eliquis dose.     TSH 13 with free T4 in middle of normal range, repeat TFTs given free T4 wnl in 4 wks.    History of CVA: 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 Advanced chronic microvascular ischemic changes. On asa 81 mg and atorvastatin 20 mg.    DNR/DNI  LTC/NH     88M dementia, HTN, CHF nos, CKD3, afib on Eliquis, PPM, bladder neoplasm, recently started on Xanax for anxiety last week presents for increased agitation and R foot wound.       Wound of right foot, being treated for presumptive osteomyelitis:  ESR 15, CRP 12, seen by podiatry, SHERYL/PVR performed, could not tolerate MRI, vascular saw rec angiogram not in line with GOC same with bone bx via OR, local wound care was performed and continued on IV abx while for MSSA in culture with plan for keflex on dc.   Pt will plan for abx course for a total of 6 weeks, end date for Keflex is 10/7/24. Pt will be discharged on keflex 500mg PO q6h. should f/u with ID in 1 month's time    Dementia with agitation, CTH no acute pathology.  Seen by psych treatment with risperidone 0.5/1.5 mg + Depakote 250 mg TID.      Chronic atrial fibrillation on Eliquis 2.5 mg BID (unclear why renally dosed at time of admission had APOLINAR), amiodarone 100 mg daily, metoprolol 25 mg ER daily. If renal functions continues to remain stable, would recommend increasing eliquis dose. He was also noted to have a prolonged QT 2/2 to having a paced rhythm. his corrected QTc is 490. Pt was seen by cards and was ok to start anti-psychotics    TSH 13 with free T4 in middle of normal range, repeat TFTs given free T4 wnl in 4 wks.    History of CVA: 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 Advanced chronic microvascular ischemic changes. On asa 81 mg and atorvastatin 20 mg.    DNR/DNI  LTC/NH

## 2024-07-25 NOTE — CONSULT NOTE ADULT - ASSESSMENT
EKG V Paced QTc 562     Assessment and Plan     1) QT prolongation : 2/2 Paced rhythm corrected QTc around 490ms , ok to start Antipsychotics, monitor with daily EKG      2) Afib :cw metoprolol and eliquis     3) DVT PPX eliquis

## 2024-07-25 NOTE — CONSULT NOTE ADULT - SUBJECTIVE AND OBJECTIVE BOX
Percy Teran MD  Interventional Cardiology / Endovascular Specialist  [place] Office : [address] [place] [address] [place] [address]  Tel:   [place] Office : [address] [facility] [place] [place] [address]  Tel: [phone]        HISTORY OF PRESENTING ILLNESS:   87 yo M, hx of A/ fib on Eliquis, HTN, CHF, pacemaker, bladder neoplasm, recently started on Xanax for anxiety last week, dementia AOx0 at baseline, presents for increased agitation. Unable to obtain hx from pt given dementia and also sedated from medications administered in ED. Per ED provider note, pt with increased agitation to residents and staff at rehab facility and we sent to ED. Pt had received IV versed after punching EMS. In ED, pt was given IV haldol 2.5 mg x2 and IV ativan 2 mg x1.     In ED, vitals T 97.9, HR 80, /76, RR 14, O2 sat 100% on RA  Labs significant for: BUN/Cr 40/1.68 at baseline  EKG personally reviewed: AV paced, QTc 526  CXR:   Imaging: CTH: IMPRESSION: Anterior communicating artery aneurysm coil embolization. No acute intracranial hemorrhage.  Chronic left frontal infarction.  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  Advanced chronic microvascular ischemic changes.    ED management: IV haldol 2.5 mg x2, ativan 2 mg x1    PAST MEDICAL & SURGICAL HISTORY:  Bacterial UTI      Atrial fibrillation      HTN (hypertension)      Dementia          SOCIAL HISTORY: Substance Use (street drugs): ( x ) never used  (  ) other:    FAMILY HISTORY:      MEDICATIONS:  aMIOdarone    Tablet 100 milliGRAM(s) Oral daily  apixaban 2.5 milliGRAM(s) Oral two times a day  aspirin  chewable 81 milliGRAM(s) Oral daily  metoprolol succinate ER 25 milliGRAM(s) Oral daily  midodrine. 10 milliGRAM(s) Oral <User Schedule>        acetaminophen     Tablet .. 650 milliGRAM(s) Oral every 6 hours PRN  ALPRAZolam 1 milliGRAM(s) Oral two times a day PRN  divalproex  milliGRAM(s) Oral every 8 hours  melatonin 3 milliGRAM(s) Oral at bedtime      atorvastatin 20 milliGRAM(s) Oral at bedtime    mupirocin 2% Ointment 1 Application(s) Topical <User Schedule>  sodium chloride 0.9%. 1000 milliLiter(s) IV Continuous <Continuous>  thiamine IVPB 500 milliGRAM(s) IV Intermittent daily      FAMILY HISTORY:        Allergies    No Known Allergies    Intolerances    	      PHYSICAL EXAM:  T(C): 36.3 (07-25-24 @ 20:41), Max: 36.3 (07-25-24 @ 04:22)  HR: 112 (07-25-24 @ 20:41) (80 - 112)  BP: 146/91 (07-25-24 @ 20:41) (135/88 - 149/84)  RR: 18 (07-25-24 @ 20:41) (18 - 18)  SpO2: 98% (07-25-24 @ 20:41) (98% - 99%)  Wt(kg): --  I&O's Summary      GENERAL: NAD  EYES: conjunctiva and sclera clear  ENMT: No tonsillar erythema, exudates, or enlargement  Cardiovascular: Normal S1 S2, No JVD, No murmurs, No edema  Respiratory: Lungs clear to auscultation	  Gastrointestinal:  Soft, Non-tender, + BS	  Extremities: No edema      LABS:	 	    CARDIAC MARKERS:                                  12.8   8.51  )-----------( 275      ( 24 Jul 2024 05:50 )             40.7     07-24    141  |  105  |  26<H>  ----------------------------<  95  4.3   |  23  |  1.34<H>    Ca    9.6      24 Jul 2024 05:50  Phos  3.3     07-24  Mg     2.10     07-24      proBNP:   Lipid Profile:   HgA1c:   TSH:     Consultant(s) Notes Reviewed:  [x ] YES  [ ] NO    Care Discussed with Consultants/Other Providers [ x] YES  [ ] NO    Imaging Personally Reviewed independently:  [x] YES  [ ] NO    All labs, radiologic studies, vitals, orders and medications list reviewed. Patient is seen and examined at bedside. Case discussed with medical team.    ASSESSMENT/PLAN:

## 2024-07-25 NOTE — DISCHARGE NOTE PROVIDER - NSDCCPCAREPLAN_GEN_ALL_CORE_FT
Stay well hydrated drinking plenty of water daily.  Take multi vitamin, Vit C and Zinc daily.  Isolate per recommendation.  Return to ED with any concerning symptoms.   Alternate Tylenol and Ibuprofen for body aches and fever.  Follow up with pcp within 5-7 days.       Isolate for 5 days, if asymptomatic or symptoms are resolving, you can return to normal activities with wearing a mask when around other for the following 5 days.       PRINCIPAL DISCHARGE DIAGNOSIS  Diagnosis: Dementia with aggressive behavior  Assessment and Plan of Treatment:      PRINCIPAL DISCHARGE DIAGNOSIS  Diagnosis: Wound of right foot  Assessment and Plan of Treatment: Possible ulcer related to foot wear that developed prior to arrival.  Culture with MSSA (staph bactermia).  Seen by foot MD (podiatry) and vascular surgery.  They could offer surgery and invasive testing but given your poor mental state due to dementia with agitation and improvement with antibiotics this was deferred after dicussions with your wife.   Please complete antibiotics and can follow-up with vascular and podiatry as outpatient.      SECONDARY DISCHARGE DIAGNOSES  Diagnosis: Dementia with agitation  Assessment and Plan of Treatment: You were seen by the psychiatry team and started on medications.     PRINCIPAL DISCHARGE DIAGNOSIS  Diagnosis: Wound of right foot  Assessment and Plan of Treatment: Possible ulcer related to foot wear that developed prior to arrival.  Culture with MSSA (staph bactermia).  Seen by foot MD (podiatry) and vascular surgery.  They could offer surgery and invasive testing but given your poor mental state due to dementia with agitation and improvement with antibiotics this was deferred after dicussions with your wife.   Please complete antibiotics and can follow-up with vascular and podiatry as outpatient. Please take Keflex until 10/7      SECONDARY DISCHARGE DIAGNOSES  Diagnosis: Dementia with agitation  Assessment and Plan of Treatment: You were seen by the psychiatry team and started on medications. Please continue with depakote and risperidone. Please continue to monitor for sedation     PRINCIPAL DISCHARGE DIAGNOSIS  Diagnosis: Wound of right foot  Assessment and Plan of Treatment: Possible ulcer related to foot wear that developed prior to arrival.  Culture with MSSA (staph bactermia).  Seen by foot MD (podiatry) and vascular surgery.  They could offer surgery and invasive testing but given your poor mental state due to dementia with agitation and improvement with antibiotics this was deferred after dicussions with your wife.   Please complete antibiotics and can follow-up with vascular and podiatry as outpatient. Please take Keflex until 10/7      SECONDARY DISCHARGE DIAGNOSES  Diagnosis: Dementia with agitation  Assessment and Plan of Treatment: You were seen by the psychiatry team and started on medications. Please continue with depakote and risperidone. Please continue to monitor for sedation    Diagnosis: QT prolongation  Assessment and Plan of Treatment: Noted on EKG but 2/2 to having a paced rhythem. Corrected his QTC is 490. You were seen by cardiology and deemed ok to start anti-psychotic medications
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
## 2024-07-25 NOTE — BH CONSULTATION LIAISON PROGRESS NOTE - NSBHATTESTBILLING_PSY_A_CORE
[facility]  [email or web address]  [phone]    Francia Medina MD, FACOG       Annual Gynecologic Exam:  WWE <40  Chief Complaint   Patient presents with    Well Woman         Carlo Orosco is a 25 y.o.  [address] female who presents for an annual well woman exam.  Patient's last menstrual period was 2019. .    With regard to the Gardisil vaccine, she received 2 of the 3 injections. She does not report additional concerns today. Menstrual status:  Her periods are light. She does not report dysmenorrhea/painful menses. She does not report irregular bleeding. Sexual history and Contraception:  Social History     Substance and Sexual Activity   Sexual Activity Yes    Partners: Male    Birth control/protection: Pill     She always use condoms with sexual activity  She does not reports new sexual partner(s) in the last year. The patient does request STD testing. We recommended testing per CDC guidelines and at patient request.     Preventive Medicine History:  Her most recent Pap smear result: No pap before d/t age    History reviewed. No pertinent past medical history.   OB History    Para Term  AB Living   1 0 0 0 1     SAB TAB Ectopic Molar Multiple Live Births   0 1 0 0          # Outcome Date GA Lbr Rickie/2nd Weight Sex Delivery Anes PTL Lv   1 TAB 2016                Past Surgical History:   Procedure Laterality Date    HX HERNIA REPAIR      HX WISDOM TEETH EXTRACTION       Family History   Problem Relation Age of Onset    Prostate Cancer Father      Social History     Socioeconomic History    Marital status: SINGLE     Spouse name: Not on file    Number of children: Not on file    Years of education: Not on file    Highest education level: Not on file   Social Needs    Financial resource strain: Not on file    Food insecurity - worry: Not on file    Food insecurity - inability: Not on file   [place] Transportation needs - medical: Not on file   [employer] needs - non-medical: Not on file   Occupational History    Not on file   Tobacco Use    Smoking status: Former Smoker    Smokeless tobacco: Never Used   Substance and Sexual Activity    Alcohol use: Yes     Comment: a few weekends per month    Drug use: No    Sexual activity: Yes     Partners: Male     Birth control/protection: Pill   Other Topics Concern    Not on file   Social History Narrative    ** Merged History Encounter **            No Known Allergies    Current Outpatient Medications   Medication Sig    norethindrone-e estradiol-iron (LOMEDIA 24 FE) 1 mg-20 mcg (24)/75 mg (4) tab Take 1 Tab by mouth daily.  nitrofurantoin, macrocrystal-monohydrate, (MACROBID) 100 mg capsule Take 1 Cap by mouth two (2) times a day.  HYDROcodone-acetaminophen (LORTAB ELIXIR) 7.5-500 mg/15 mL oral solution Take 10 mL by mouth four (4) times daily as needed for Pain. No current facility-administered medications for this visit.         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    Dysmenorrhea N94.6    Excessive bleeding in premenopausal period N92.4       Review of Systems - History obtained from the patient  Constitutional: negative for weight loss, fever, night sweats  HEENT: negative for hearing loss, earache, congestion, snoring, sorethroat  CV: negative for chest pain, palpitations, edema  Resp: negative for cough, shortness of breath, wheezing  GI: negative for change in bowel habits, abdominal pain, black or bloody stools  : negative for frequency, dysuria, hematuria  GYN: see HPI  MSK: negative for back pain, joint pain, muscle pain  Breast: negative for breast lumps, nipple discharge, galactorrhea  Skin :negative for itching, rash, hives  Neuro: negative for dizziness, headache, confusion, weakness  Psych: negative for anxiety, depression, change in mood  Heme/lymph: negative for bleeding, bruising, pallor    Physical Exam  Visit Vitals  /62   Ht 5' 2\" (1.575 m)   Wt 119 lb (54 kg)   LMP 03/05/2019   BMI 21.77 kg/m²       Constitutional  · Appearance: well-nourished, well developed, alert, in no acute distress    HENT  · Head and Face: appears normal    Neck  · Inspection/Palpation: normal appearance, no masses or tenderness  · Lymph Nodes: no lymphadenopathy present  · Thyroid: gland size normal, nontender, no nodules or masses present on palpation    Chest  · Respiratory Effort: breathing unlabored  · Auscultation: normal breath sounds    Cardiovascular  · Heart:  · Auscultation: regular rate and rhythm without murmur    Breasts  · Inspection of Breasts: breasts symmetrical, no skin changes, no discharge present, nipple appearance normal, no skin retraction present  · Palpation of Breasts and Axillae: no masses present on palpation, no breast tenderness  · Axillary Lymph Nodes: no lymphadenopathy present    Gastrointestinal  · Abdominal Examination: abdomen non-tender to palpation, normal bowel sounds, no masses present  · Liver and spleen: no hepatomegaly present, spleen not palpable  · Hernias: no hernias identified    Genitourinary  · External Genitalia: normal appearance for age, no discharge present, no tenderness present, no inflammatory lesions present, no masses present  · Vagina: normal vaginal vault without central or paravaginal defects, thin white discharge present, no inflammatory lesions present, no masses present  · Bladder: non-tender to palpation  · Urethra: appears normal  · Cervix: normal   · Uterus: normal size, shape and consistency  · Adnexa: no adnexal tenderness present, no adnexal masses present  · Perineum: perineum within normal limits, no evidence of trauma, no rashes or skin lesions present  · Anus: anus within normal limits, no hemorrhoids present  · Inguinal Lymph Nodes: no lymphadenopathy present    Skin  · General Inspection: no rash, no lesions identified    Neurologic/Psychiatric  · Mental Status:  · Orientation: grossly oriented to person, place and time  · Mood and Affect: mood normal, affect appropriate    Assessment:  25 y.o.  for well woman exam  Encounter Diagnosis   Name Primary?  Encounter for annual routine gynecological examination Yes       Plan:  The patient was counseled about diet, exercise, healthy lifestyle  We discussed self breast exam  We discussed safer sex practices, condom use and risk factors for sexually transmitted diseases. We discussed current pap smear and HR HPV testing guidelines. We recommend follow up one year for routine annual gynecologic exam or sooner prn  We recommend routine follow up with her primary care doctor for management of chronic medical problems and non-gynecologic concerns  Handouts were given to the patient  We discussed calcium/vitamin D/weight bearing exercise and osteoporosis prevention    Discussed risks, benefits and alternatives of OCP/nuvaring/patch: including but not limited to dvt/pe/mi/cva/ca/gi risks and that smoking, increasing age and other health conditions can increase these risks. Folllow up:  [x] return for annual well woman exam in one year or sooner if she is having problems  [] follow up and ultrasound  [] 6 months  [] 3 months  [] 6 weeks   [] 1 month    Orders Placed This Encounter    norethindrone-e estradiol-iron (LOMEDIA 24 FE) 1 mg-20 mcg (24)/75 mg (4) tab    PAP IG, CT-NG, RFX APTIMA HPV ASCUS (184231, [id number]))       No results found for any visits on 03/15/19. 05016-[id number] OBS or IP - low complexity OR 25-34 mins

## 2024-07-25 NOTE — PHYSICAL THERAPY INITIAL EVALUATION ADULT - NSPTDISCHREC_GEN_A_CORE
Return to long term care as pt appears to be at functional baseline of wheelchair bound and bed bound. Pt will not be placed on PT program.

## 2024-07-25 NOTE — DISCHARGE NOTE PROVIDER - NSFOLLOWUPCLINICS_GEN_ALL_ED_FT
[facility]  Urology  [address] [place] [id number]  Phone: [phone]  Fax:   Follow Up Time: Routine    [facility] - Infectious Disease  Infectious Disease  [address] [place] [address] [place] [address]  Phone: [phone]  Fax:   Follow Up Time: 1 month

## 2024-07-25 NOTE — DISCHARGE NOTE PROVIDER - NSDCMRMEDTOKEN_GEN_ALL_CORE_FT
ALPRAZolam 1 mg oral tablet: 1 tab(s) orally once a day as needed for  anxiety  amiodarone 100 mg oral tablet: 1 tab(s) orally once a day  aspirin 81 mg oral tablet: orally once a day  atorvastatin 20 mg oral tablet: 1 tab(s) orally once a day  Eliquis 5 mg oral tablet: 1 tab(s) orally 2 times a day  melatonin 3 mg oral tablet: 1 tab(s) orally once a day (at bedtime)  metoprolol succinate 25 mg oral capsule, extended release: 1 cap(s) orally once a day  midodrine 10 mg oral tablet: 1 tab(s) orally 2 times a day  OLANZapine 2.5 mg oral tablet: 1 tab(s) orally once a day (at bedtime)   acetaminophen 325 mg oral tablet: 2 tab(s) orally every 6 hours As needed Temp greater or equal to 38C (100.4F), Mild Pain (1 - 3)  amiodarone 200 mg oral tablet: 0.5 tab(s) orally once a day  apixaban 2.5 mg oral tablet: 1 tab(s) orally 2 times a day  aspirin 81 mg oral tablet, chewable: 1 tab(s) orally once a day  atorvastatin 20 mg oral tablet: 1 tab(s) orally once a day (at bedtime)  cephalexin 500 mg oral tablet: 1 tab(s) orally 4 times a day Last dose on 10/7/24  divalproex sodium 125 mg oral delayed release capsule: 3 times a day Please take three times a day, 0800, 1400, and 2000  melatonin 3 mg oral tablet: 1 tab(s) orally once a day (at bedtime)  metoprolol succinate 25 mg oral capsule, extended release: 1 cap(s) orally once a day  risperiDONE 0.5 mg oral tablet, disintegratin tab(s) orally once a day at 800am  risperiDONE 1 mg oral tablet, disintegratin.5 tab(s) orally once a day (at bedtime)   acetaminophen 325 mg oral tablet: 2 tab(s) orally every 6 hours As needed Temp greater or equal to 38C (100.4F), Mild Pain (1 - 3)  amiodarone 200 mg oral tablet: 0.5 tab(s) orally once a day  apixaban 2.5 mg oral tablet: 1 tab(s) orally 2 times a day  aspirin 81 mg oral tablet, chewable: 1 tab(s) orally once a day  atorvastatin 20 mg oral tablet: 1 tab(s) orally once a day (at bedtime)  cephalexin 500 mg oral tablet: 1 tab(s) orally 4 times a day Last dose on 10/7/24  Depakote Sprinkles 125 mg oral delayed release capsule: 2 cap(s) orally 3 times a day please take 3 times a day 800, 1400 and 2000  melatonin 3 mg oral tablet: 1 tab(s) orally once a day (at bedtime)  metoprolol succinate 25 mg oral capsule, extended release: 1 cap(s) orally once a day  risperiDONE 0.5 mg oral tablet, disintegratin tab(s) orally once a day at 800am  risperiDONE 1 mg oral tablet, disintegratin.5 tab(s) orally once a day (at bedtime)

## 2024-07-25 NOTE — PROGRESS NOTE ADULT - SUBJECTIVE AND OBJECTIVE BOX
Patient is a 88y old  Male who presents with a chief complaint of agitation (24 Jul 2024 21:13)       INTERVAL HPI/OVERNIGHT EVENTS:  Patient seen and evaluated at bedside.  Pt is resting comfortable in NAD. Denies N/V/F/C.    Allergies    No Known Allergies    Intolerances        Vital Signs Last 24 Hrs  T(C): 36.3 (25 Jul 2024 04:22), Max: 36.4 (24 Jul 2024 11:50)  T(F): 97.3 (25 Jul 2024 04:22), Max: 97.5 (24 Jul 2024 11:50)  HR: 81 (25 Jul 2024 04:22) (80 - 106)  BP: 149/84 (25 Jul 2024 04:22) (127/77 - 149/84)  BP(mean): --  RR: 18 (25 Jul 2024 04:22) (18 - 18)  SpO2: 98% (25 Jul 2024 04:22) (98% - 100%)    Parameters below as of 25 Jul 2024 04:22  Patient On (Oxygen Delivery Method): room air        LABS:                        12.8   8.51  )-----------( 275      ( 24 Jul 2024 05:50 )             40.7     07-24    141  |  105  |  26<H>  ----------------------------<  95  4.3   |  23  |  1.34<H>    Ca    9.6      24 Jul 2024 05:50  Phos  3.3     07-24  Mg     2.10     07-24        Urinalysis Basic - ( 24 Jul 2024 05:50 )    Color: x / Appearance: x / SG: x / pH: x  Gluc: 95 mg/dL / Ketone: x  / Bili: x / Urobili: x   Blood: x / Protein: x / Nitrite: x   Leuk Esterase: x / RBC: x / WBC x   Sq Epi: x / Non Sq Epi: x / Bacteria: x      CAPILLARY BLOOD GLUCOSE          Lower Extremity Physical Exam:  Vascular: DP/PT 2/4, B/L, CFT <3 seconds B/L, Temperature gradient  warm to cool, B/L.   Neuro: Epicritic sensation diminished to the level of digits, B/L.  Musculoskeletal/Ortho: Limited ankle ROM, Hammertoe deformity of the right 2nd and 3rd digit  Skin: Right foot second dorsal proximal interphalangeal joint wound to bone with granular wound, edema circumferentially secondary to dactylitis, localized erythema, no purulence, no malodor. Left foot no open wound and no signs of acute infection.     RADIOLOGY & ADDITIONAL TESTS:   Patient is a 88y old  Male who presents with a chief complaint of agitation (24 Jul 2024 21:13)     Pt is an 89 yo M, hx of A/ fib on Eliquis, HTN, CHF, pacemaker, bladder neoplasm, recently started on Xanax for anxiety last week, dementia AOx0 at baseline, presents for increased agitation. Per ED provider note, pt with increased agitation to residents and staff at rehab facility and we sent to ED. Pt had received IV versed after punching EMS. In ED, pt was given IV haldol 2.5 mg x2 and IV ativan 2 mg x1. Patient also has an ulcer of his right second that started about three months ago. Patient is unsure of how he sustained the ulcer. Further HPI and ROS cannot be obtained 2/2 mental status      PAST MEDICAL & SURGICAL HISTORY:  Bacterial UTI  Atrial fibrillation  HTN (hypertension)  Dementia      INTERVAL HPI/OVERNIGHT EVENTS:  Patient seen and evaluated at bedside.  Pt is resting comfortable in NAD. Awake, does not respond to questions appropriately.     Allergies    No Known Allergies    Intolerances        Vital Signs Last 24 Hrs  T(C): 36.3 (25 Jul 2024 04:22), Max: 36.4 (24 Jul 2024 11:50)  T(F): 97.3 (25 Jul 2024 04:22), Max: 97.5 (24 Jul 2024 11:50)  HR: 81 (25 Jul 2024 04:22) (80 - 106)  BP: 149/84 (25 Jul 2024 04:22) (127/77 - 149/84)  BP(mean): --  RR: 18 (25 Jul 2024 04:22) (18 - 18)  SpO2: 98% (25 Jul 2024 04:22) (98% - 100%)    Parameters below as of 25 Jul 2024 04:22  Patient On (Oxygen Delivery Method): room air        LABS:                        12.8   8.51  )-----------( 275      ( 24 Jul 2024 05:50 )             40.7     07-24    141  |  105  |  26<H>  ----------------------------<  95  4.3   |  23  |  1.34<H>    Ca    9.6      24 Jul 2024 05:50  Phos  3.3     07-24  Mg     2.10     07-24        Urinalysis Basic - ( 24 Jul 2024 05:50 )    Color: x / Appearance: x / SG: x / pH: x  Gluc: 95 mg/dL / Ketone: x  / Bili: x / Urobili: x   Blood: x / Protein: x / Nitrite: x   Leuk Esterase: x / RBC: x / WBC x   Sq Epi: x / Non Sq Epi: x / Bacteria: x      CAPILLARY BLOOD GLUCOSE      Lower Extremity Physical Exam:  Vascular: DP/PT 2/4, B/L, CFT <3 seconds B/L, Temperature gradient  warm to cool, B/L.   Neuro: Epicritic sensation diminished to the level of digits, B/L.  Musculoskeletal/Ortho: Limited ankle ROM, Hammertoe deformity of the right 2nd and 3rd digit  Skin: Right foot second dorsal proximal interphalangeal joint wound to bone and into the PIPJ with granular wound borders, edema circumferentially secondary to dactylitis, localized erythema, no purulence, no malodor/ fluctuance/ soft tissue crepitus/ necrosis.   Left foot no open wound and no signs of acute infection.     RADIOLOGY & ADDITIONAL TESTS:

## 2024-07-25 NOTE — DISCHARGE NOTE PROVIDER - ATTENDING DISCHARGE PHYSICAL EXAMINATION:
GEN: Appears in no acute distress  HEENT: tracking, neck supple,   MOUTH: moist mucous membranes, no exudates or lesions   PULM: Clear to auscultation bilaterally, no wheezes, rales, rhonchi  CV: RRR, S1S2, no murmurs, rubs or gallops  Abdominal: Soft, nontender to palpation, non-distended, normoactive bowel sounds  Extremities: no swelling  NEURO: sleepy but arousable,   Skin: right foot-2nd digit, no erythema, healing scap

## 2024-07-25 NOTE — PHYSICAL THERAPY INITIAL EVALUATION ADULT - PATIENT PROFILE REVIEW, REHAB EVAL
Discharge instructions given to patient, verbalized understanding. Left via wheelchair.    Activity - Increase as tolerated. Pt cleared for PT evaluation prior to session by IRIS Lujan./yes

## 2024-07-25 NOTE — DISCHARGE NOTE PROVIDER - NSDCACTIVITY_GEN_ALL_CORE
Interval History:stable,alert on bipap.    Review of Systems   Constitutional: Positive for activity change and appetite change.   HENT: Negative for congestion and dental problem.    Eyes: Negative for discharge and itching.   Respiratory: Negative for apnea and chest tightness.    Gastrointestinal: Negative for abdominal distention and abdominal pain.   Endocrine: Negative for cold intolerance.   Musculoskeletal: Positive for arthralgias.   Neurological: Positive for weakness.     Objective:     Vital Signs (Most Recent):  Temp: 96.5 °F (35.8 °C) (07/19/19 0315)  Pulse: (!) 53 (07/19/19 0800)  Resp: (!) 21 (07/19/19 0800)  BP: 114/69 (07/19/19 0800)  SpO2: 95 % (07/19/19 0800) Vital Signs (24h Range):  Temp:  [96.5 °F (35.8 °C)-98 °F (36.7 °C)] 96.5 °F (35.8 °C)  Pulse:  [48-71] 53  Resp:  [16-37] 21  SpO2:  [77 %-100 %] 95 %  BP: ()/() 114/69     Weight: 107.1 kg (236 lb 1.8 oz)  Body mass index is 39.29 kg/m².    Intake/Output Summary (Last 24 hours) at 7/19/2019 0840  Last data filed at 7/19/2019 0724  Gross per 24 hour   Intake 600 ml   Output 1975 ml   Net -1375 ml      Physical Exam   HENT:   Head: Atraumatic.   Eyes: EOM are normal.   Neck: Neck supple.   Pulmonary/Chest: Effort normal.   Abdominal: Soft.   Musculoskeletal: She exhibits tenderness.   Skin: Skin is warm and dry.       Significant Labs:   ABGs:   Recent Labs   Lab 07/18/19  2357   PH 7.403   PCO2 64.8*   HCO3 40.5*   POCSATURATED 98   BE 13     Blood Culture: No results for input(s): LABBLOO in the last 48 hours.  CBC: No results for input(s): WBC, HGB, HCT, PLT in the last 48 hours.    Significant Imaging:reviewed.   No restrictions

## 2024-07-25 NOTE — PROGRESS NOTE ADULT - SUBJECTIVE AND OBJECTIVE BOX
Madi Pinzon MD  Academic Hospitalist  Pager [phone]  Email: [email or web address]  Available on [employer]        PROGRESS NOTE:     Patient is a 88y old  Male who presents with a chief complaint of agitation (25 Jul 2024 10:02)      SUBJECTIVE / OVERNIGHT EVENTS:  Patient seen and examined this morning. Moments of agitation.   ADDITIONAL REVIEW OF SYSTEMS:  No f/c/n/v      MEDICATIONS  (STANDING):  aMIOdarone    Tablet 100 milliGRAM(s) Oral daily  apixaban 2.5 milliGRAM(s) Oral two times a day  aspirin  chewable 81 milliGRAM(s) Oral daily  atorvastatin 20 milliGRAM(s) Oral at bedtime  divalproex  milliGRAM(s) Oral every 8 hours  melatonin 3 milliGRAM(s) Oral at bedtime  metoprolol succinate ER 25 milliGRAM(s) Oral daily  midodrine. 10 milliGRAM(s) Oral <User Schedule>  sodium chloride 0.9%. 1000 milliLiter(s) (75 mL/Hr) IV Continuous <Continuous>  thiamine IVPB 500 milliGRAM(s) IV Intermittent daily    MEDICATIONS  (PRN):  acetaminophen     Tablet .. 650 milliGRAM(s) Oral every 6 hours PRN Temp greater or equal to 38C (100.4F), Mild Pain (1 - 3)  ALPRAZolam 1 milliGRAM(s) Oral two times a day PRN for anxiety      CAPILLARY BLOOD GLUCOSE        I&O's Summary      PHYSICAL EXAM:  Vital Signs Last 24 Hrs  T(C): 36.2 (25 Jul 2024 11:18), Max: 36.3 (24 Jul 2024 21:00)  T(F): 97.2 (25 Jul 2024 11:18), Max: 97.3 (24 Jul 2024 21:00)  HR: 80 (25 Jul 2024 11:18) (80 - 106)  BP: 135/88 (25 Jul 2024 11:18) (127/77 - 149/84)  BP(mean): --  RR: 18 (25 Jul 2024 11:18) (18 - 18)  SpO2: 99% (25 Jul 2024 11:18) (98% - 99%)    Parameters below as of 25 Jul 2024 11:18  Patient On (Oxygen Delivery Method): room air        CONSTITUTIONAL: NAD, pleasant  RESPIRATORY: Normal respiratory effort; no respiratory distress, CTAB  CARDIOVASCULAR: No visible JVD, No lower extremity edema; S1S2, no m,r,g  ABDOMEN: Not guarding, does not appear distended, BS+  MUSCLOSKELETAL: no clubbing or cyanosis of digits; no joint swelling   PSYCH: Talkative but very confused    LABS:                        12.8   8.51  )-----------( 275      ( 24 Jul 2024 05:50 )             40.7     07-24    141  |  105  |  26<H>  ----------------------------<  95  4.3   |  23  |  1.34<H>    Ca    9.6      24 Jul 2024 05:50  Phos  3.3     07-24  Mg     2.10     07-24            Urinalysis Basic - ( 24 Jul 2024 05:50 )    Color: x / Appearance: x / SG: x / pH: x  Gluc: 95 mg/dL / Ketone: x  / Bili: x / Urobili: x   Blood: x / Protein: x / Nitrite: x   Leuk Esterase: x / RBC: x / WBC x   Sq Epi: x / Non Sq Epi: x / Bacteria: x        Culture - Abscess with Gram Stain (collected 24 Jul 2024 17:30)  Source: .Abscess RIght 2nd digit  Gram Stain (25 Jul 2024 07:00):    No polymorphonuclear cells seen per low power field    No organisms seen per oil power field    Urinalysis with Rflx Culture (collected 22 Jul 2024 22:06)        RADIOLOGY & ADDITIONAL TESTS:  Results Reviewed:   Imaging Personally Reviewed:  Electrocardiogram Personally Reviewed:    COORDINATION OF CARE:  Care Discussed with Consultants/Other Providers [Y/N]: Case discussed during interdisciplinary rounds with social work and case management. D/W Dr. Pedro    Prior or Outpatient Records Reviewed [Y/N]:

## 2024-07-25 NOTE — DISCHARGE NOTE PROVIDER - CARE PROVIDERS DIRECT ADDRESSES
,[email or web address]

## 2024-07-25 NOTE — DISCHARGE NOTE PROVIDER - PROVIDER TOKENS
PROVIDER:[TOKEN:[[id number]],FOLLOWUP:[1 month]],PROVIDER:[TOKEN:[[id number]],FOLLOWUP:[2 weeks]]

## 2024-07-26 DIAGNOSIS — S91.301A UNSPECIFIED OPEN WOUND, RIGHT FOOT, INITIAL ENCOUNTER: ICD-10-CM

## 2024-07-26 LAB — GRAM STN FLD: ABNORMAL

## 2024-07-26 PROCEDURE — 99233 SBSQ HOSP IP/OBS HIGH 50: CPT

## 2024-07-26 PROCEDURE — 99231 SBSQ HOSP IP/OBS SF/LOW 25: CPT

## 2024-07-26 PROCEDURE — 93281 PM DEVICE PROGR EVAL MULTI: CPT | Mod: 26

## 2024-07-26 PROCEDURE — 93010 ELECTROCARDIOGRAM REPORT: CPT

## 2024-07-26 RX ORDER — AMPICILLIN SOD/SULBACTAM SOD 3 G
3 VIAL (EA) INJECTION ONCE
Refills: 0 | Status: COMPLETED | OUTPATIENT
Start: 2024-07-26 | End: 2024-07-26

## 2024-07-26 RX ORDER — AMPICILLIN SOD/SULBACTAM SOD 3 G
VIAL (EA) INJECTION
Refills: 0 | Status: DISCONTINUED | OUTPATIENT
Start: 2024-07-26 | End: 2024-07-27

## 2024-07-26 RX ORDER — AMPICILLIN SOD/SULBACTAM SOD 3 G
3 VIAL (EA) INJECTION EVERY 6 HOURS
Refills: 0 | Status: DISCONTINUED | OUTPATIENT
Start: 2024-07-26 | End: 2024-07-27

## 2024-07-26 RX ORDER — RISPERIDONE 1 MG/1
0.25 TABLET, FILM COATED ORAL
Refills: 0 | Status: DISCONTINUED | OUTPATIENT
Start: 2024-07-26 | End: 2024-07-28

## 2024-07-26 RX ADMIN — MIDODRINE HYDROCHLORIDE 10 MILLIGRAM(S): 2.5 TABLET ORAL at 05:24

## 2024-07-26 RX ADMIN — Medication 81 MILLIGRAM(S): at 13:31

## 2024-07-26 RX ADMIN — DIVALPROEX SODIUM 250 MILLIGRAM(S): 125 CAPSULE, COATED PELLETS ORAL at 21:32

## 2024-07-26 RX ADMIN — Medication 105 MILLIGRAM(S): at 16:06

## 2024-07-26 RX ADMIN — APIXABAN 2.5 MILLIGRAM(S): 5 TABLET, FILM COATED ORAL at 05:24

## 2024-07-26 RX ADMIN — Medication 25 MILLIGRAM(S): at 05:24

## 2024-07-26 RX ADMIN — DIVALPROEX SODIUM 250 MILLIGRAM(S): 125 CAPSULE, COATED PELLETS ORAL at 05:24

## 2024-07-26 RX ADMIN — APIXABAN 2.5 MILLIGRAM(S): 5 TABLET, FILM COATED ORAL at 17:19

## 2024-07-26 RX ADMIN — MIDODRINE HYDROCHLORIDE 10 MILLIGRAM(S): 2.5 TABLET ORAL at 17:19

## 2024-07-26 RX ADMIN — ATORVASTATIN CALCIUM 20 MILLIGRAM(S): 40 TABLET, FILM COATED ORAL at 21:32

## 2024-07-26 RX ADMIN — MUPIROCIN CALCIUM 1 APPLICATION(S): 20 CREAM TOPICAL at 10:24

## 2024-07-26 RX ADMIN — Medication 3 MILLIGRAM(S): at 21:32

## 2024-07-26 RX ADMIN — Medication 200 GRAM(S): at 17:16

## 2024-07-26 RX ADMIN — RISPERIDONE 0.25 MILLIGRAM(S): 1 TABLET, FILM COATED ORAL at 17:20

## 2024-07-26 RX ADMIN — AMIODARONE HYDROCHLORIDE 100 MILLIGRAM(S): 50 INJECTION, SOLUTION INTRAVENOUS at 05:24

## 2024-07-26 RX ADMIN — DIVALPROEX SODIUM 250 MILLIGRAM(S): 125 CAPSULE, COATED PELLETS ORAL at 13:27

## 2024-07-26 NOTE — BH CONSULTATION LIAISON PROGRESS NOTE - OTHER
Good Pt linear in response when understood the question, otherwise pt prone to confabulation Unable to assess because patient would not relax when trying to evaluate for tone

## 2024-07-26 NOTE — PROGRESS NOTE ADULT - ASSESSMENT
87 yo M, hx of A/ fib on Eliquis, HTN, CHF, pacemaker, bladder neoplasm, recently started on Xanax for anxiety last week, dementia AOx0 at baseline, presents for increased agitation. Pt admitted for further management.

## 2024-07-26 NOTE — PROGRESS NOTE ADULT - PROBLEM SELECTOR PLAN 2
Pt w/ R foot wound  XR w/o concern for OM  Pending SHERYL/PVR per podiatry recs  Pending MR R foot to r/o OM  EP Consulted for clearance as pt w/ pacemaker Pt w/ R foot wound  XR w/o concern for OM  Pending SHERYL/PVR per podiatry recs  Pending MR R foot to r/o OM  EP Consulted for clearance as pt w/ pacemaker  s/p EP clearance. Form faxed to MRI. Pending MRI approval prior to placing order

## 2024-07-26 NOTE — PROGRESS NOTE ADULT - SUBJECTIVE AND OBJECTIVE BOX
Comfort Foy        Patient is a 88y old  Male who presents with a chief complaint of agitation (25 Jul 2024 14:28)      SUBJECTIVE / OVERNIGHT EVENTS: No acute overnight events. Appears calm this morning but confused.     MEDICATIONS  (STANDING):  aMIOdarone    Tablet 100 milliGRAM(s) Oral daily  ampicillin/sulbactam  IVPB      apixaban 2.5 milliGRAM(s) Oral two times a day  aspirin  chewable 81 milliGRAM(s) Oral daily  atorvastatin 20 milliGRAM(s) Oral at bedtime  divalproex  milliGRAM(s) Oral every 8 hours  melatonin 3 milliGRAM(s) Oral at bedtime  metoprolol succinate ER 25 milliGRAM(s) Oral daily  midodrine. 10 milliGRAM(s) Oral <User Schedule>  mupirocin 2% Ointment 1 Application(s) Topical <User Schedule>  risperiDONE   Tablet 0.25 milliGRAM(s) Oral <User Schedule>  sodium chloride 0.9%. 1000 milliLiter(s) (75 mL/Hr) IV Continuous <Continuous>  thiamine IVPB 500 milliGRAM(s) IV Intermittent daily    MEDICATIONS  (PRN):  acetaminophen     Tablet .. 650 milliGRAM(s) Oral every 6 hours PRN Temp greater or equal to 38C (100.4F), Mild Pain (1 - 3)  OLANZapine Injectable 1.25 milliGRAM(s) IntraMuscular every 6 hours PRN aggression    Allergies    No Known Allergies    Intolerances        Vital Signs Last 24 Hrs  T(C): 36.4 (26 Jul 2024 11:18), Max: 36.4 (26 Jul 2024 05:00)  T(F): 97.5 (26 Jul 2024 11:18), Max: 97.6 (26 Jul 2024 05:00)  HR: 90 (26 Jul 2024 11:18) (80 - 112)  BP: 161/99 (26 Jul 2024 11:18) (146/91 - 161/99)  BP(mean): --  RR: 18 (26 Jul 2024 11:18) (17 - 18)  SpO2: 97% (26 Jul 2024 11:18) (97% - 99%)    Parameters below as of 26 Jul 2024 11:18  Patient On (Oxygen Delivery Method): room air      Daily     Daily   CAPILLARY BLOOD GLUCOSE        I&O's Summary      PHYSICAL EXAM:  GENERAL: NAD  HEAD:  Atraumatic, Normocephalic  EYES: EOMI,  conjunctiva and sclera clear  NECK: Supple  CHEST/LUNG: Clear to auscultation bilaterally; No wheeze, crackles or rhonchi   HEART: Regular rate and rhythm; Normal S1 S2, No murmurs, rubs, or gallops  ABDOMEN: Soft, Nontender, Nondistended; Bowel sounds present  PSYCH: AAOx0  NEUROLOGY: SROM  SKIN: Warm and dry    LABS:    Hgb Trend: 12.8<--, 12.3<--        Creatinine Trend: 1.34<--, 1.68<--              RADIOLOGY & ADDITIONAL TESTS:    Imaging Personally Reviewed.    Consultant(s) Notes Reviewed.    Care Discussed with Consultants/Other Providers.
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
## 2024-07-26 NOTE — BH CONSULTATION LIAISON PROGRESS NOTE - NSBHCHARTREVIEWLAB_PSY_A_CORE FT
LABS:  cret          
  LABS:  cret                        12.8   8.51  )-----------( 275      ( 24 Jul 2024 05:50 )             40.7     07-24    141  |  105  |  26<H>  ----------------------------<  95  4.3   |  23  |  1.34<H>    Ca    9.6      24 Jul 2024 05:50  Phos  3.3     07-24  Mg     2.10     07-24    Thyroid Stimulating Hormone, Serum in AM (07.24.24 @ 05:50)   Thyroid Stimulating Hormone, Serum: 13.20 uIU/mL    
  LABS:  cret                        12.8   8.51  )-----------( 275      ( 24 Jul 2024 05:50 )             40.7     07-24    141  |  105  |  26<H>  ----------------------------<  95  4.3   |  23  |  1.34<H>    Ca    9.6      24 Jul 2024 05:50  Phos  3.3     07-24  Mg     2.10     07-24    Thyroid Stimulating Hormone, Serum in AM (07.24.24 @ 05:50)   Thyroid Stimulating Hormone, Serum: 13.20 uIU/mL

## 2024-07-26 NOTE — PROGRESS NOTE ADULT - ASSESSMENT
EKG V Paced QTc 562     Assessment and Plan     1) QT prolongation : 2/2 Paced rhythm corrected QTc around 490ms , ok to start Antipsychotics, monitor with daily EKG      2) Afib : cw metoprolol and eliquis     3) DVT PPX eliquis

## 2024-07-26 NOTE — BH CONSULTATION LIAISON PROGRESS NOTE - NSBHFUPINTERVALHXFT_PSY_A_CORE
Pt remains in mitts. [facility] nursing staff reports that patient's behavior is very difficult and he can be very "nasty" with the staff. Pt attempted multiple times to take off IV last night. Pt received no PRNs last night.    Met pt at bedside. Pt was awoken from sleep for interview. Pt was inattentive during interview, pt had to repeat question multiple times to illicit response. Patient A&Ox1 (Oriented to name only). Pt unable to tell interviewer his , or his childrens names. Pt describes no SI / thoughts of self-harm or violence towards others. Pt describes no AV/H. Pt did confabulate during interview when he did not know the answer.

## 2024-07-26 NOTE — BH CONSULTATION LIAISON PROGRESS NOTE - NSBHATTESTCOMMENTATTENDFT_PSY_A_CORE
Met with the patient along with ms4, impression and plan discussed and agreed upon
Met with the patient. Loud, angry, agitated, spitting at staff, has b/l mitts on.   Case discussed with ms4, impression and plan discussed and agreed upon  Manually calculated qt 490 as per cardiology, initiating risperidone, while closely monitoring qtc
Met with the patient along with ms4, impression and plan discussed and agreed upon

## 2024-07-26 NOTE — PROGRESS NOTE ADULT - PROBLEM SELECTOR PLAN 1
Pt presents with worsening agitation, acute encephalopathy superimposed on chronic dementia, possibly in setting of worsening dementia. current toxic/metabolic/infectious work up negative. S/p IV haldol 2.5 mg x2 and IV ativan 2 mg x1.   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 No acute   intracranial hemorrhage. Chronic left frontal infarction.  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  Advanced chronic microvascular ischemic changes.      - called psych consult, fu recs   - Resume risperidone per psych recs  - Will need QTC monitoring daily.   - avoid benzo/sedatives

## 2024-07-26 NOTE — PROGRESS NOTE ADULT - PROBLEM SELECTOR PLAN 3
c/w Eliquis, amiodarone, metoprolol  ECG paced rhythm, prolonged QTc  Will need QTC monitoring daily   keep K>4 and Mg>2 c/w Eliquis, amiodarone, metoprolol  ECG paced rhythm, prolonged QTc  Corrected QTC 7/26 apx 462 ms  Will need QTC monitoring daily .   keep K>4 and Mg>2

## 2024-07-26 NOTE — PROCEDURE NOTE - ADDITIONAL PROCEDURE DETAILS
Well functioning [employer] CRT-P, lead impedances and thresholds stable, no events  Device and leads MRI Conditional, no abandoned leads or capped leads  No events    Cardiology order form in chart

## 2024-07-27 ENCOUNTER — RESULT REVIEW (OUTPATIENT)
Age: 89
End: 2024-07-27

## 2024-07-27 DIAGNOSIS — L03.031 CELLULITIS OF RIGHT TOE: ICD-10-CM

## 2024-07-27 LAB
-  CLINDAMYCIN: SIGNIFICANT CHANGE UP
-  ERYTHROMYCIN: SIGNIFICANT CHANGE UP
-  GENTAMICIN: SIGNIFICANT CHANGE UP
-  OXACILLIN: SIGNIFICANT CHANGE UP
-  PENICILLIN: SIGNIFICANT CHANGE UP
-  RIFAMPIN: SIGNIFICANT CHANGE UP
-  TETRACYCLINE: SIGNIFICANT CHANGE UP
-  TRIMETHOPRIM/SULFAMETHOXAZOLE: SIGNIFICANT CHANGE UP
-  VANCOMYCIN: SIGNIFICANT CHANGE UP
ALBUMIN SERPL ELPH-MCNC: 3.4 G/DL — SIGNIFICANT CHANGE UP (ref 3.3–5)
ALP SERPL-CCNC: 87 U/L — SIGNIFICANT CHANGE UP (ref 40–120)
ALT FLD-CCNC: 20 U/L — SIGNIFICANT CHANGE UP (ref 4–41)
AMMONIA BLD-MCNC: 38 UMOL/L — SIGNIFICANT CHANGE UP (ref 11–55)
ANION GAP SERPL CALC-SCNC: 15 MMOL/L — HIGH (ref 7–14)
ANION GAP SERPL CALC-SCNC: 16 MMOL/L — HIGH (ref 7–14)
AST SERPL-CCNC: 27 U/L — SIGNIFICANT CHANGE UP (ref 4–40)
BASOPHILS # BLD AUTO: 0.09 K/UL — SIGNIFICANT CHANGE UP (ref 0–0.2)
BASOPHILS NFR BLD AUTO: 1 % — SIGNIFICANT CHANGE UP (ref 0–2)
BILIRUB SERPL-MCNC: 0.7 MG/DL — SIGNIFICANT CHANGE UP (ref 0.2–1.2)
BUN SERPL-MCNC: 17 MG/DL — SIGNIFICANT CHANGE UP (ref 7–23)
BUN SERPL-MCNC: 18 MG/DL — SIGNIFICANT CHANGE UP (ref 7–23)
CALCIUM SERPL-MCNC: 9.4 MG/DL — SIGNIFICANT CHANGE UP (ref 8.4–10.5)
CALCIUM SERPL-MCNC: 9.4 MG/DL — SIGNIFICANT CHANGE UP (ref 8.4–10.5)
CHLORIDE SERPL-SCNC: 106 MMOL/L — SIGNIFICANT CHANGE UP (ref 98–107)
CHLORIDE SERPL-SCNC: 107 MMOL/L — SIGNIFICANT CHANGE UP (ref 98–107)
CO2 SERPL-SCNC: 17 MMOL/L — LOW (ref 22–31)
CO2 SERPL-SCNC: 18 MMOL/L — LOW (ref 22–31)
CREAT SERPL-MCNC: 1.22 MG/DL — SIGNIFICANT CHANGE UP (ref 0.5–1.3)
CREAT SERPL-MCNC: 1.23 MG/DL — SIGNIFICANT CHANGE UP (ref 0.5–1.3)
EGFR: 57 ML/MIN/1.73M2 — LOW
EGFR: 57 ML/MIN/1.73M2 — LOW
EOSINOPHIL # BLD AUTO: 0.51 K/UL — HIGH (ref 0–0.5)
EOSINOPHIL NFR BLD AUTO: 5.7 % — SIGNIFICANT CHANGE UP (ref 0–6)
GLUCOSE SERPL-MCNC: 77 MG/DL — SIGNIFICANT CHANGE UP (ref 70–99)
GLUCOSE SERPL-MCNC: 77 MG/DL — SIGNIFICANT CHANGE UP (ref 70–99)
HCT VFR BLD CALC: 41.7 % — SIGNIFICANT CHANGE UP (ref 39–50)
HGB BLD-MCNC: 13.4 G/DL — SIGNIFICANT CHANGE UP (ref 13–17)
IANC: 6.17 K/UL — SIGNIFICANT CHANGE UP (ref 1.8–7.4)
IMM GRANULOCYTES NFR BLD AUTO: 0.3 % — SIGNIFICANT CHANGE UP (ref 0–0.9)
LYMPHOCYTES # BLD AUTO: 1.47 K/UL — SIGNIFICANT CHANGE UP (ref 1–3.3)
LYMPHOCYTES # BLD AUTO: 16.4 % — SIGNIFICANT CHANGE UP (ref 13–44)
MAGNESIUM SERPL-MCNC: 1.9 MG/DL — SIGNIFICANT CHANGE UP (ref 1.6–2.6)
MCHC RBC-ENTMCNC: 29.1 PG — SIGNIFICANT CHANGE UP (ref 27–34)
MCHC RBC-ENTMCNC: 32.1 GM/DL — SIGNIFICANT CHANGE UP (ref 32–36)
MCV RBC AUTO: 90.7 FL — SIGNIFICANT CHANGE UP (ref 80–100)
METHOD TYPE: SIGNIFICANT CHANGE UP
MONOCYTES # BLD AUTO: 0.69 K/UL — SIGNIFICANT CHANGE UP (ref 0–0.9)
MONOCYTES NFR BLD AUTO: 7.7 % — SIGNIFICANT CHANGE UP (ref 2–14)
NEUTROPHILS # BLD AUTO: 6.17 K/UL — SIGNIFICANT CHANGE UP (ref 1.8–7.4)
NEUTROPHILS NFR BLD AUTO: 68.9 % — SIGNIFICANT CHANGE UP (ref 43–77)
NRBC # BLD: 0 /100 WBCS — SIGNIFICANT CHANGE UP (ref 0–0)
NRBC # FLD: 0 K/UL — SIGNIFICANT CHANGE UP (ref 0–0)
PHOSPHATE SERPL-MCNC: 3.2 MG/DL — SIGNIFICANT CHANGE UP (ref 2.5–4.5)
PLATELET # BLD AUTO: 245 K/UL — SIGNIFICANT CHANGE UP (ref 150–400)
POTASSIUM SERPL-MCNC: 4.2 MMOL/L — SIGNIFICANT CHANGE UP (ref 3.5–5.3)
POTASSIUM SERPL-MCNC: 4.2 MMOL/L — SIGNIFICANT CHANGE UP (ref 3.5–5.3)
POTASSIUM SERPL-SCNC: 4.2 MMOL/L — SIGNIFICANT CHANGE UP (ref 3.5–5.3)
POTASSIUM SERPL-SCNC: 4.2 MMOL/L — SIGNIFICANT CHANGE UP (ref 3.5–5.3)
PROT SERPL-MCNC: 6.2 G/DL — SIGNIFICANT CHANGE UP (ref 6–8.3)
RBC # BLD: 4.6 M/UL — SIGNIFICANT CHANGE UP (ref 4.2–5.8)
RBC # FLD: 14.5 % — SIGNIFICANT CHANGE UP (ref 10.3–14.5)
SODIUM SERPL-SCNC: 139 MMOL/L — SIGNIFICANT CHANGE UP (ref 135–145)
SODIUM SERPL-SCNC: 140 MMOL/L — SIGNIFICANT CHANGE UP (ref 135–145)
WBC # BLD: 8.96 K/UL — SIGNIFICANT CHANGE UP (ref 3.8–10.5)
WBC # FLD AUTO: 8.96 K/UL — SIGNIFICANT CHANGE UP (ref 3.8–10.5)

## 2024-07-27 PROCEDURE — 93922 UPR/L XTREMITY ART 2 LEVELS: CPT | Mod: 26,59

## 2024-07-27 PROCEDURE — 93923 UPR/LXTR ART STDY 3+ LVLS: CPT | Mod: 26

## 2024-07-27 PROCEDURE — 99232 SBSQ HOSP IP/OBS MODERATE 35: CPT

## 2024-07-27 PROCEDURE — 93010 ELECTROCARDIOGRAM REPORT: CPT

## 2024-07-27 RX ORDER — VANCOMYCIN HYDROCHLORIDE 5 G/100ML
1250 INJECTION, POWDER, LYOPHILIZED, FOR SOLUTION INTRAVENOUS EVERY 24 HOURS
Refills: 0 | Status: DISCONTINUED | OUTPATIENT
Start: 2024-07-27 | End: 2024-07-28

## 2024-07-27 RX ORDER — MAGNESIUM SULFATE 500 MG/ML
1 VIAL (ML) INJECTION ONCE
Refills: 0 | Status: COMPLETED | OUTPATIENT
Start: 2024-07-27 | End: 2024-07-27

## 2024-07-27 RX ADMIN — Medication 200 GRAM(S): at 05:20

## 2024-07-27 RX ADMIN — APIXABAN 2.5 MILLIGRAM(S): 5 TABLET, FILM COATED ORAL at 16:09

## 2024-07-27 RX ADMIN — RISPERIDONE 0.25 MILLIGRAM(S): 1 TABLET, FILM COATED ORAL at 16:09

## 2024-07-27 RX ADMIN — Medication 200 GRAM(S): at 11:21

## 2024-07-27 RX ADMIN — APIXABAN 2.5 MILLIGRAM(S): 5 TABLET, FILM COATED ORAL at 05:21

## 2024-07-27 RX ADMIN — DIVALPROEX SODIUM 250 MILLIGRAM(S): 125 CAPSULE, COATED PELLETS ORAL at 13:07

## 2024-07-27 RX ADMIN — Medication 200 GRAM(S): at 00:06

## 2024-07-27 RX ADMIN — Medication 100 GRAM(S): at 12:48

## 2024-07-27 RX ADMIN — Medication 105 MILLIGRAM(S): at 11:55

## 2024-07-27 RX ADMIN — AMIODARONE HYDROCHLORIDE 100 MILLIGRAM(S): 50 INJECTION, SOLUTION INTRAVENOUS at 05:20

## 2024-07-27 RX ADMIN — RISPERIDONE 0.25 MILLIGRAM(S): 1 TABLET, FILM COATED ORAL at 06:29

## 2024-07-27 RX ADMIN — Medication 81 MILLIGRAM(S): at 11:19

## 2024-07-27 RX ADMIN — MUPIROCIN CALCIUM 1 APPLICATION(S): 20 CREAM TOPICAL at 08:27

## 2024-07-27 RX ADMIN — DIVALPROEX SODIUM 250 MILLIGRAM(S): 125 CAPSULE, COATED PELLETS ORAL at 05:20

## 2024-07-27 RX ADMIN — VANCOMYCIN HYDROCHLORIDE 125 MILLIGRAM(S): 5 INJECTION, POWDER, LYOPHILIZED, FOR SOLUTION INTRAVENOUS at 18:07

## 2024-07-27 RX ADMIN — MIDODRINE HYDROCHLORIDE 10 MILLIGRAM(S): 2.5 TABLET ORAL at 05:24

## 2024-07-27 RX ADMIN — Medication 3 MILLIGRAM(S): at 21:32

## 2024-07-27 RX ADMIN — DIVALPROEX SODIUM 250 MILLIGRAM(S): 125 CAPSULE, COATED PELLETS ORAL at 21:31

## 2024-07-27 RX ADMIN — ATORVASTATIN CALCIUM 20 MILLIGRAM(S): 40 TABLET, FILM COATED ORAL at 21:32

## 2024-07-27 RX ADMIN — Medication 25 MILLIGRAM(S): at 05:20

## 2024-07-27 NOTE — PROGRESS NOTE ADULT - PROBLEM SELECTOR PLAN 2
Pt w/ R foot wound  XR w/o concern for OM  Pending SHERYL/PVR per podiatry recs  Pending MR R foot to r/o OM  EP Consulted for clearance as pt w/ pacemaker  s/p EP clearance. Form faxed to MRI. Pending MRI approval prior to placing order

## 2024-07-27 NOTE — PROGRESS NOTE ADULT - SUBJECTIVE AND OBJECTIVE BOX
Medicine Progress Note    Patient is a 88y old  Male who presents with a chief complaint of agitation (27 Jul 2024 11:56)      SUBJECTIVE / OVERNIGHT EVENTS: diarrhea    ADDITIONAL REVIEW OF SYSTEMS:    MEDICATIONS  (STANDING):  aMIOdarone    Tablet 100 milliGRAM(s) Oral daily  ampicillin/sulbactam  IVPB      ampicillin/sulbactam  IVPB 3 Gram(s) IV Intermittent every 6 hours  apixaban 2.5 milliGRAM(s) Oral two times a day  aspirin  chewable 81 milliGRAM(s) Oral daily  atorvastatin 20 milliGRAM(s) Oral at bedtime  divalproex  milliGRAM(s) Oral every 8 hours  melatonin 3 milliGRAM(s) Oral at bedtime  metoprolol succinate ER 25 milliGRAM(s) Oral daily  midodrine. 10 milliGRAM(s) Oral <User Schedule>  mupirocin 2% Ointment 1 Application(s) Topical <User Schedule>  risperiDONE   Tablet 0.25 milliGRAM(s) Oral <User Schedule>    MEDICATIONS  (PRN):  acetaminophen     Tablet .. 650 milliGRAM(s) Oral every 6 hours PRN Temp greater or equal to 38C (100.4F), Mild Pain (1 - 3)  OLANZapine Injectable 1.25 milliGRAM(s) IntraMuscular every 6 hours PRN aggression    CAPILLARY BLOOD GLUCOSE        I&O's Summary      PHYSICAL EXAM:  Vital Signs Last 24 Hrs  T(C): 36.6 (27 Jul 2024 11:30), Max: 36.7 (27 Jul 2024 05:00)  T(F): 97.8 (27 Jul 2024 11:30), Max: 98 (27 Jul 2024 05:00)  HR: 83 (27 Jul 2024 11:30) (80 - 96)  BP: 136/82 (27 Jul 2024 11:30) (136/82 - 150/90)  BP(mean): --  RR: 18 (27 Jul 2024 11:30) (17 - 18)  SpO2: 99% (27 Jul 2024 11:30) (97% - 100%)    Parameters below as of 27 Jul 2024 11:30  Patient On (Oxygen Delivery Method): room air    PHYSICAL EXAM:  GENERAL: NAD  HEAD:  Atraumatic, Normocephalic  EYES: EOMI,  conjunctiva and sclera clear  NECK: Supple  CHEST/LUNG: Clear to auscultation bilaterally; No wheeze, crackles or rhonchi   HEART: Regular rate and rhythm; Normal S1 S2, No murmurs, rubs, or gallops  ABDOMEN: Soft, Nontender, Nondistended; Bowel sounds present  PSYCH: AAOx0  NEUROLOGY: SROM  SKIN: Warm , rt foot wound         LABS:                        13.4   8.96  )-----------( 245      ( 27 Jul 2024 06:20 )             41.7     07-27    139  |  106  |  18  ----------------------------<  77  4.2   |  17<L>  |  1.22    Ca    9.4      27 Jul 2024 06:20  Phos  3.2     07-27  Mg     1.90     07-27    TPro  6.2  /  Alb  3.4  /  TBili  0.7  /  DBili  x   /  AST  27  /  ALT  20  /  AlkPhos  87  07-27          Urinalysis Basic - ( 27 Jul 2024 06:20 )    Color: x / Appearance: x / SG: x / pH: x  Gluc: 77 mg/dL / Ketone: x  / Bili: x / Urobili: x   Blood: x / Protein: x / Nitrite: x   Leuk Esterase: x / RBC: x / WBC x   Sq Epi: x / Non Sq Epi: x / Bacteria: x        Culture - Abscess with Gram Stain (collected 24 Jul 2024 17:30)  Source: .Abscess RIght 2nd digit  Gram Stain (26 Jul 2024 15:13):    No polymorphonuclear cells seen per low power field    No organisms seen per oil power field  Preliminary Report (26 Jul 2024 15:13):    Few Staphylococcus aureus          RADIOLOGY & ADDITIONAL TESTS:  Imaging from Last 24 Hours:    Electrocardiogram/QTc Interval:    COORDINATION OF CARE:  Care Discussed with Consultants/Other Providers: ACP

## 2024-07-27 NOTE — PROGRESS NOTE ADULT - ASSESSMENT
88M presents with right foot second digit dorsal wound to bone   - Patient was evaluated and seen  - Afebrile, no leukocytosis   - Right foot second dorsal proximal interphalangeal joint wound to bone with granular wound, edema circumferentially secondary to dactylitis, localized erythema, no purulence, no malodor. Left foot no open wound and no signs of acute infection.   - Right foot xrays: 2nd digit proximal phalanx and distal phalanx erosion (resident read)   - Bilateral SHERYL/PVR pending   - Right foot wound culture show few Staph aureus  - Recommend ID consult  - Podiatry plan: local wound care vs right foot partial 2nd digit ray resection pending pt decision/ ID recs  - Please document medical clearance for possible podiatric procedure   - Discussed with Attending   88M with right 2nd toe wound to bone and joint, cellulitis  - Patient was evaluated and seen  - Afebrile, no leukocytosis   - Bilateral SHERYL/PVR pending   - 7/24 Right foot wound culture show few Staph aureus. Recommend ID consult. Continue IV abx  - Podiatry plan:  high likelyhood of OM due to depth of the wound and surrounding cellulitis, please obtain MRI to confirm. Long term abx vs right foot 2nd digit resection/ athroplasty pending family GOC / ID recs  - Please document medical clearance for possible podiatric procedure   - Discussed with Attending

## 2024-07-27 NOTE — CHART NOTE - NSCHARTNOTEFT_GEN_A_CORE
Corrected QTc 511 by [facility]azzett calculation. Discussed with cardiology pt with pacemaker can continue antipsychotic

## 2024-07-27 NOTE — PROGRESS NOTE ADULT - PROBLEM SELECTOR PLAN 3
c/w Eliquis, amiodarone, metoprolol  ECG paced rhythm, prolonged QTc  Corrected QTC 7/26 apx 462 ms  Will need QTC monitoring daily .   keep K>4 and Mg>2

## 2024-07-27 NOTE — PROGRESS NOTE ADULT - SUBJECTIVE AND OBJECTIVE BOX
Patient is a 88y old  Male who presents with a chief complaint of agitation (26 Jul 2024 14:14)       INTERVAL HPI/OVERNIGHT EVENTS:  Patient seen and evaluated at bedside.  Pt is resting comfortable in NAD. Denies N/V/F/C.      Allergies    No Known Allergies    Intolerances        Vital Signs Last 24 Hrs  T(C): 36.6 (27 Jul 2024 11:30), Max: 36.7 (27 Jul 2024 05:00)  T(F): 97.8 (27 Jul 2024 11:30), Max: 98 (27 Jul 2024 05:00)  HR: 83 (27 Jul 2024 11:30) (80 - 96)  BP: 136/82 (27 Jul 2024 11:30) (136/82 - 150/90)  BP(mean): --  RR: 18 (27 Jul 2024 11:30) (17 - 18)  SpO2: 99% (27 Jul 2024 11:30) (97% - 100%)    Parameters below as of 27 Jul 2024 11:30  Patient On (Oxygen Delivery Method): room air        LABS:                        13.4   8.96  )-----------( 245      ( 27 Jul 2024 06:20 )             41.7     07-27    139  |  106  |  18  ----------------------------<  77  4.2   |  17<L>  |  1.22    Ca    9.4      27 Jul 2024 06:20  Phos  3.2     07-27  Mg     1.90     07-27    TPro  6.2  /  Alb  3.4  /  TBili  0.7  /  DBili  x   /  AST  27  /  ALT  20  /  AlkPhos  87  07-27      Urinalysis Basic - ( 27 Jul 2024 06:20 )    Color: x / Appearance: x / SG: x / pH: x  Gluc: 77 mg/dL / Ketone: x  / Bili: x / Urobili: x   Blood: x / Protein: x / Nitrite: x   Leuk Esterase: x / RBC: x / WBC x   Sq Epi: x / Non Sq Epi: x / Bacteria: x      CAPILLARY BLOOD GLUCOSE          Lower Extremity Physical Exam:  Vascular: DP/PT 2/4, B/L, CFT <3 seconds B/L, Temperature gradient  warm to cool, B/L.   Neuro: Epicritic sensation diminished to the level of digits, B/L.  Musculoskeletal/Ortho: Limited ankle ROM, Hammertoe deformity of the right 2nd and 3rd digit  Skin: Right foot second dorsal proximal interphalangeal joint wound to bone with granular wound, edema circumferentially secondary to dactylitis showing improvement, localized erythema to the RF second digit, no purulence, no malodor, no pus. Left foot no open wound and no signs of acute infection.       RADIOLOGY & ADDITIONAL TESTS:      ACC: [id number] EXAM:  XR FOOT COMP MIN 3 VIEWS RT   ORDERED BY: EARLENE JIMENEZ     PROCEDURE DATE:  07/25/2024          INTERPRETATION:  CLINICAL INDICATION: right foot ulceration    EXAM:  Frontal oblique lateral right foot from 7/25/2024 at 0956. No similar   prior studies available for comparison.    IMPRESSION:  No tracking soft tissue gas collections, radiopaque foreign bodies, or   gross radiographic evidence for osteomyelitis. However if concern remains   MRI suggested to further assess.    No fractures or dislocations.    Tarsometatarsal alignment maintained without evidence for a Lisfranc   injury.    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 Preserved alignment remaining articulations and no joint   margin erosions.    Tiny calcaneal enthesophytes.    Dense hypertrophic toenails shadows, correlate for onychomycosis.    Scattered vascular calcifications.    --- End of Report ---            ESTEPHANIE RODRIGUEZ MD; Attending Radiologist  This document has been electronically signed. Jul 25 2024 10:24AM   Patient is a 88y old  Male who presents with a chief complaint of agitation (26 Jul 2024 14:14)       INTERVAL HPI/OVERNIGHT EVENTS:  Patient seen and evaluated at bedside.  Pt is resting comfortable in NAD, awake.    Allergies    No Known Allergies    Intolerances    .MEDICATIONS  (STANDING):  aMIOdarone    Tablet 100 milliGRAM(s) Oral daily  ampicillin/sulbactam  IVPB      ampicillin/sulbactam  IVPB 3 Gram(s) IV Intermittent every 6 hours  apixaban 2.5 milliGRAM(s) Oral two times a day  aspirin  chewable 81 milliGRAM(s) Oral daily  atorvastatin 20 milliGRAM(s) Oral at bedtime  divalproex  milliGRAM(s) Oral every 8 hours  melatonin 3 milliGRAM(s) Oral at bedtime  metoprolol succinate ER 25 milliGRAM(s) Oral daily  midodrine. 10 milliGRAM(s) Oral <User Schedule>  mupirocin 2% Ointment 1 Application(s) Topical <User Schedule>  risperiDONE   Tablet 0.25 milliGRAM(s) Oral <User Schedule>      Vital Signs Last 24 Hrs  T(C): 36.6 (27 Jul 2024 11:30), Max: 36.7 (27 Jul 2024 05:00)  T(F): 97.8 (27 Jul 2024 11:30), Max: 98 (27 Jul 2024 05:00)  HR: 83 (27 Jul 2024 11:30) (80 - 96)  BP: 136/82 (27 Jul 2024 11:30) (136/82 - 150/90)  BP(mean): --  RR: 18 (27 Jul 2024 11:30) (17 - 18)  SpO2: 99% (27 Jul 2024 11:30) (97% - 100%)    Parameters below as of 27 Jul 2024 11:30  Patient On (Oxygen Delivery Method): room air        LABS:                        13.4   8.96  )-----------( 245      ( 27 Jul 2024 06:20 )             41.7     07-27    139  |  106  |  18  ----------------------------<  77  4.2   |  17<L>  |  1.22    Ca    9.4      27 Jul 2024 06:20  Phos  3.2     07-27  Mg     1.90     07-27    TPro  6.2  /  Alb  3.4  /  TBili  0.7  /  DBili  x   /  AST  27  /  ALT  20  /  AlkPhos  87  07-27      Urinalysis Basic - ( 27 Jul 2024 06:20 )    Color: x / Appearance: x / SG: x / pH: x  Gluc: 77 mg/dL / Ketone: x  / Bili: x / Urobili: x   Blood: x / Protein: x / Nitrite: x   Leuk Esterase: x / RBC: x / WBC x   Sq Epi: x / Non Sq Epi: x / Bacteria: x      CAPILLARY BLOOD GLUCOSE          Lower Extremity Physical Exam:  Vascular: DP/PT 2/4, B/L, CFT <3 seconds B/L, Temperature gradient  warm to cool, B/L.   Neuro: Epicritic sensation diminished to the level of digits, B/L.  Musculoskeletal/Ortho: Limited ankle ROM, Hammertoe deformity of the right 2nd and 3rd digit  Skin: Right foot second dorsal proximal interphalangeal joint wound to bone with granular wound border, improved in deoth from probing into the joint prior, edema circumferentially secondary to dactylitis showing improvement, localized erythema to the RF second digit, no purulence, no malodor, no fluctuance/ necrosis/ soft tissue crepitus. Left foot no open wound and no signs of acute infection.       RADIOLOGY & ADDITIONAL TESTS:      ACC: [id number] EXAM:  XR FOOT COMP MIN 3 VIEWS RT   ORDERED BY: EARLENE JIMENEZ     PROCEDURE DATE:  07/25/2024          INTERPRETATION:  CLINICAL INDICATION: right foot ulceration    EXAM:  Frontal oblique lateral right foot from 7/25/2024 at 0956. No similar   prior studies available for comparison.    IMPRESSION:  No tracking soft tissue gas collections, radiopaque foreign bodies, or   gross radiographic evidence for osteomyelitis. However if concern remains   MRI suggested to further assess.    No fractures or dislocations.    Tarsometatarsal alignment maintained without evidence for a Lisfranc   injury.    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 Preserved alignment remaining articulations and no joint   margin erosions.    Tiny calcaneal enthesophytes.    Dense hypertrophic toenails shadows, correlate for onychomycosis.    Scattered vascular calcifications.    --- End of Report ---            ESTEPHANIE RODRIGUEZ MD; Attending Radiologist  This document has been electronically signed. Jul 25 2024 10:24AM

## 2024-07-27 NOTE — PROGRESS NOTE ADULT - SUBJECTIVE AND OBJECTIVE BOX
Percy Teran MD  Interventional Cardiology / Endovascular Specialist  [place] Office : [address] [place] [address] [place]. [address]  Tel:   [place] Office : [address] [facility] [place] [address]  Tel: [phone]    Pt is lying in bed, NAD  	  MEDICATIONS:  aMIOdarone    Tablet 100 milliGRAM(s) Oral daily  apixaban 2.5 milliGRAM(s) Oral two times a day  aspirin  chewable 81 milliGRAM(s) Oral daily  metoprolol succinate ER 25 milliGRAM(s) Oral daily  midodrine. 10 milliGRAM(s) Oral <User Schedule>    ampicillin/sulbactam  IVPB      ampicillin/sulbactam  IVPB 3 Gram(s) IV Intermittent every 6 hours      acetaminophen     Tablet .. 650 milliGRAM(s) Oral every 6 hours PRN  divalproex  milliGRAM(s) Oral every 8 hours  melatonin 3 milliGRAM(s) Oral at bedtime  OLANZapine Injectable 1.25 milliGRAM(s) IntraMuscular every 6 hours PRN  risperiDONE   Tablet 0.25 milliGRAM(s) Oral <User Schedule>      atorvastatin 20 milliGRAM(s) Oral at bedtime    mupirocin 2% Ointment 1 Application(s) Topical <User Schedule>      PAST MEDICAL/SURGICAL HISTORY  PAST MEDICAL & SURGICAL HISTORY:  Bacterial UTI      Atrial fibrillation      HTN (hypertension)      Dementia          SOCIAL HISTORY: Substance Use (street drugs): ( x ) never used  (  ) other:    FAMILY HISTORY:      PHYSICAL EXAM:  T(C): 36.6 (07-27-24 @ 11:30), Max: 36.7 (07-27-24 @ 05:00)  HR: 83 (07-27-24 @ 11:30) (80 - 96)  BP: 136/82 (07-27-24 @ 11:30) (136/82 - 150/90)  RR: 18 (07-27-24 @ 11:30) (17 - 18)  SpO2: 99% (07-27-24 @ 11:30) (97% - 100%)  Wt(kg): --  I&O's Summary      GENERAL: NAD  EYES: conjunctiva and sclera clear  ENMT: No tonsillar erythema, exudates, or enlargement  Cardiovascular: Normal S1 S2, No JVD, No murmurs, No edema  Respiratory: Lungs clear to auscultation	  Gastrointestinal:  Soft, Non-tender, + BS	  Extremities: No edema                              13.4   8.96  )-----------( 245      ( 27 Jul 2024 06:20 )             41.7     07-27    139  |  106  |  18  ----------------------------<  77  4.2   |  17<L>  |  1.22    Ca    9.4      27 Jul 2024 06:20  Phos  3.2     07-27  Mg     1.90     07-27    TPro  6.2  /  Alb  3.4  /  TBili  0.7  /  DBili  x   /  AST  27  /  ALT  20  /  AlkPhos  87  07-27    proBNP:   Lipid Profile:   HgA1c:   TSH:     Consultant(s) Notes Reviewed:  [x ] YES  [ ] NO    Care Discussed with Consultants/Other Providers [ x] YES  [ ] NO    Imaging Personally Reviewed independently:  [x] YES  [ ] NO    All labs, radiologic studies, vitals, orders and medications list reviewed. Patient is seen and examined at bedside. Case discussed with medical team.

## 2024-07-28 LAB
ANION GAP SERPL CALC-SCNC: 15 MMOL/L — HIGH (ref 7–14)
BUN SERPL-MCNC: 18 MG/DL — SIGNIFICANT CHANGE UP (ref 7–23)
CALCIUM SERPL-MCNC: 9.2 MG/DL — SIGNIFICANT CHANGE UP (ref 8.4–10.5)
CHLORIDE SERPL-SCNC: 106 MMOL/L — SIGNIFICANT CHANGE UP (ref 98–107)
CO2 SERPL-SCNC: 17 MMOL/L — LOW (ref 22–31)
CREAT SERPL-MCNC: 1.22 MG/DL — SIGNIFICANT CHANGE UP (ref 0.5–1.3)
EGFR: 57 ML/MIN/1.73M2 — LOW
GLUCOSE SERPL-MCNC: 80 MG/DL — SIGNIFICANT CHANGE UP (ref 70–99)
HCT VFR BLD CALC: 42.7 % — SIGNIFICANT CHANGE UP (ref 39–50)
HGB BLD-MCNC: 13.9 G/DL — SIGNIFICANT CHANGE UP (ref 13–17)
MAGNESIUM SERPL-MCNC: 2.2 MG/DL — SIGNIFICANT CHANGE UP (ref 1.6–2.6)
MCHC RBC-ENTMCNC: 29.5 PG — SIGNIFICANT CHANGE UP (ref 27–34)
MCHC RBC-ENTMCNC: 32.6 GM/DL — SIGNIFICANT CHANGE UP (ref 32–36)
MCV RBC AUTO: 90.7 FL — SIGNIFICANT CHANGE UP (ref 80–100)
NRBC # BLD: 0 /100 WBCS — SIGNIFICANT CHANGE UP (ref 0–0)
NRBC # FLD: 0 K/UL — SIGNIFICANT CHANGE UP (ref 0–0)
PLATELET # BLD AUTO: 237 K/UL — SIGNIFICANT CHANGE UP (ref 150–400)
POTASSIUM SERPL-MCNC: 4.7 MMOL/L — SIGNIFICANT CHANGE UP (ref 3.5–5.3)
POTASSIUM SERPL-SCNC: 4.7 MMOL/L — SIGNIFICANT CHANGE UP (ref 3.5–5.3)
RBC # BLD: 4.71 M/UL — SIGNIFICANT CHANGE UP (ref 4.2–5.8)
RBC # FLD: 14.4 % — SIGNIFICANT CHANGE UP (ref 10.3–14.5)
SODIUM SERPL-SCNC: 138 MMOL/L — SIGNIFICANT CHANGE UP (ref 135–145)
WBC # BLD: 8.47 K/UL — SIGNIFICANT CHANGE UP (ref 3.8–10.5)
WBC # FLD AUTO: 8.47 K/UL — SIGNIFICANT CHANGE UP (ref 3.8–10.5)

## 2024-07-28 PROCEDURE — 99232 SBSQ HOSP IP/OBS MODERATE 35: CPT

## 2024-07-28 RX ORDER — RISPERIDONE 1 MG/1
0.5 TABLET, FILM COATED ORAL
Refills: 0 | Status: DISCONTINUED | OUTPATIENT
Start: 2024-07-28 | End: 2024-07-29

## 2024-07-28 RX ORDER — RISPERIDONE 1 MG/1
0.25 TABLET, FILM COATED ORAL
Refills: 0 | Status: DISCONTINUED | OUTPATIENT
Start: 2024-07-28 | End: 2024-07-29

## 2024-07-28 RX ORDER — VANCOMYCIN HYDROCHLORIDE 5 G/100ML
1250 INJECTION, POWDER, LYOPHILIZED, FOR SOLUTION INTRAVENOUS EVERY 24 HOURS
Refills: 0 | Status: DISCONTINUED | OUTPATIENT
Start: 2024-07-28 | End: 2024-07-29

## 2024-07-28 RX ADMIN — Medication 81 MILLIGRAM(S): at 08:29

## 2024-07-28 RX ADMIN — Medication 3 MILLIGRAM(S): at 21:27

## 2024-07-28 RX ADMIN — RISPERIDONE 0.5 MILLIGRAM(S): 1 TABLET, FILM COATED ORAL at 16:06

## 2024-07-28 RX ADMIN — Medication 25 MILLIGRAM(S): at 06:03

## 2024-07-28 RX ADMIN — APIXABAN 2.5 MILLIGRAM(S): 5 TABLET, FILM COATED ORAL at 06:03

## 2024-07-28 RX ADMIN — DIVALPROEX SODIUM 250 MILLIGRAM(S): 125 CAPSULE, COATED PELLETS ORAL at 06:03

## 2024-07-28 RX ADMIN — DIVALPROEX SODIUM 250 MILLIGRAM(S): 125 CAPSULE, COATED PELLETS ORAL at 21:27

## 2024-07-28 RX ADMIN — RISPERIDONE 0.25 MILLIGRAM(S): 1 TABLET, FILM COATED ORAL at 06:03

## 2024-07-28 RX ADMIN — ATORVASTATIN CALCIUM 20 MILLIGRAM(S): 40 TABLET, FILM COATED ORAL at 21:28

## 2024-07-28 RX ADMIN — MIDODRINE HYDROCHLORIDE 10 MILLIGRAM(S): 2.5 TABLET ORAL at 06:03

## 2024-07-28 RX ADMIN — AMIODARONE HYDROCHLORIDE 100 MILLIGRAM(S): 50 INJECTION, SOLUTION INTRAVENOUS at 06:04

## 2024-07-28 RX ADMIN — APIXABAN 2.5 MILLIGRAM(S): 5 TABLET, FILM COATED ORAL at 16:06

## 2024-07-28 RX ADMIN — VANCOMYCIN HYDROCHLORIDE 125 MILLIGRAM(S): 5 INJECTION, POWDER, LYOPHILIZED, FOR SOLUTION INTRAVENOUS at 18:13

## 2024-07-28 RX ADMIN — MUPIROCIN CALCIUM 1 APPLICATION(S): 20 CREAM TOPICAL at 08:07

## 2024-07-28 RX ADMIN — DIVALPROEX SODIUM 250 MILLIGRAM(S): 125 CAPSULE, COATED PELLETS ORAL at 13:14

## 2024-07-28 NOTE — PROGRESS NOTE ADULT - SUBJECTIVE AND OBJECTIVE BOX
Patient is a 88y old  Male who presents with a chief complaint of agitation (27 Jul 2024 13:52)       INTERVAL HPI/OVERNIGHT EVENTS:  Patient seen and evaluated at bedside.  Pt is resting comfortable in NAD. Denies N/V/F/C.  Pain rated at X/10    Allergies    No Known Allergies    Intolerances        Vital Signs Last 24 Hrs  T(C): 36.6 (28 Jul 2024 05:00), Max: 36.7 (27 Jul 2024 16:23)  T(F): 97.8 (28 Jul 2024 05:00), Max: 98.1 (27 Jul 2024 16:23)  HR: 79 (28 Jul 2024 05:00) (72 - 83)  BP: 177/91 (28 Jul 2024 05:00) (136/82 - 177/91)  BP(mean): --  RR: 18 (28 Jul 2024 05:00) (18 - 18)  SpO2: 100% (28 Jul 2024 05:00) (99% - 100%)    Parameters below as of 28 Jul 2024 05:00  Patient On (Oxygen Delivery Method): room air        LABS:                        13.9   8.47  )-----------( 237      ( 28 Jul 2024 06:19 )             42.7     07-28    138  |  106  |  18  ----------------------------<  80  4.7   |  17<L>  |  1.22    Ca    9.2      28 Jul 2024 06:19  Phos  3.2     07-27  Mg     2.20     07-28    TPro  6.2  /  Alb  3.4  /  TBili  0.7  /  DBili  x   /  AST  27  /  ALT  20  /  AlkPhos  87  07-27      Urinalysis Basic - ( 28 Jul 2024 06:19 )    Color: x / Appearance: x / SG: x / pH: x  Gluc: 80 mg/dL / Ketone: x  / Bili: x / Urobili: x   Blood: x / Protein: x / Nitrite: x   Leuk Esterase: x / RBC: x / WBC x   Sq Epi: x / Non Sq Epi: x / Bacteria: x      CAPILLARY BLOOD GLUCOSE          Lower Extremity Physical Exam:    Vascular: DP/PT 2/4, B/L, CFT <3 seconds B/L, Temperature gradient  warm to cool, B/L.   Neuro: Epicritic sensation diminished to the level of digits, B/L.  Musculoskeletal/Ortho: Limited ankle ROM, Hammertoe deformity of the right 2nd and 3rd digit  Skin: Right foot second dorsal proximal interphalangeal joint wound to bone with granular wound borde, edema improved, erythema resolved, , no purulence, no malodor. Left foot no open wound and no signs of acute infection.     RADIOLOGY & ADDITIONAL TESTS:   Patient is a 88y old  Male who presents with a chief complaint of agitation (27 Jul 2024 13:52)       INTERVAL HPI/OVERNIGHT EVENTS:  Patient seen and evaluated at bedside.  Pt is resting comfortable in NAD.      Allergies    No Known Allergies    Intolerances        Vital Signs Last 24 Hrs  T(C): 36.6 (28 Jul 2024 05:00), Max: 36.7 (27 Jul 2024 16:23)  T(F): 97.8 (28 Jul 2024 05:00), Max: 98.1 (27 Jul 2024 16:23)  HR: 79 (28 Jul 2024 05:00) (72 - 83)  BP: 177/91 (28 Jul 2024 05:00) (136/82 - 177/91)  BP(mean): --  RR: 18 (28 Jul 2024 05:00) (18 - 18)  SpO2: 100% (28 Jul 2024 05:00) (99% - 100%)    Parameters below as of 28 Jul 2024 05:00  Patient On (Oxygen Delivery Method): room air        LABS:                        13.9   8.47  )-----------( 237      ( 28 Jul 2024 06:19 )             42.7     07-28    138  |  106  |  18  ----------------------------<  80  4.7   |  17<L>  |  1.22    Ca    9.2      28 Jul 2024 06:19  Phos  3.2     07-27  Mg     2.20     07-28    TPro  6.2  /  Alb  3.4  /  TBili  0.7  /  DBili  x   /  AST  27  /  ALT  20  /  AlkPhos  87  07-27      Urinalysis Basic - ( 28 Jul 2024 06:19 )    Color: x / Appearance: x / SG: x / pH: x  Gluc: 80 mg/dL / Ketone: x  / Bili: x / Urobili: x   Blood: x / Protein: x / Nitrite: x   Leuk Esterase: x / RBC: x / WBC x   Sq Epi: x / Non Sq Epi: x / Bacteria: x      CAPILLARY BLOOD GLUCOSE          Lower Extremity Physical Exam:    Vascular: DP/PT 2/4, B/L, CFT <3 seconds B/L, Temperature gradient  warm to cool, B/L.   Neuro: Epicritic sensation diminished to the level of digits, B/L.  Musculoskeletal/Ortho: Limited ankle ROM, Hammertoe deformity of the right 2nd and 3rd digit  Skin: Right foot second dorsal proximal interphalangeal joint wound to bone with granular wound border, edema improved, erythema resolved, , no purulence, no malodor. Left foot no open wound and no signs of acute infection.     RADIOLOGY & ADDITIONAL TESTS:

## 2024-07-28 NOTE — PROGRESS NOTE ADULT - ASSESSMENT
88M with right 2nd digit dorsal wound with cellulitis  - Patient was evaluated and seen  - Afebrile, no leukocytosis   - Right foot second dorsal proximal interphalangeal joint wound to bone with granular wound borde, edema improved, erythema resolved, , no purulence, no malodor. Left foot no open wound and no signs of acute infection.   -  Right foot wound culture: MSSA (prelim).  - Right foot xray: No OM, no gas  - Bilateral SHERYL/PVR pending   - Right foot MRI pending EP pacemaker clearance  - Recommend ID consult   - Podiatry plan: Local wound care  vs 2nd toe amputation pending MRI, vascular recs, family decision.   - Please document medical clearance for possible podiatric surgical intervention under anesthesia   - Discussed with attending 88M with right 2nd digit dorsal wound with cellulitis  - Patient was evaluated and seen  - Afebrile, no leukocytosis   - Right foot second dorsal proximal interphalangeal joint wound to bone with granular wound borde, edema improved, erythema resolved, , no purulence, no malodor. Left foot no open wound and no signs of acute infection.   -  Right foot wound culture: MSSA (prelim).  - Right foot xray: No OM, no gas  - Bilateral SHERYL/PVR pending   - Right foot MRI pending EP pacemaker clearance  - Recommend discontinue Vancomycin and start Unasyn.   - Recommend ID consult   - Podiatry plan: Local wound care  vs 2nd toe amputation pending MRI, vascular recs, family decision.   - Please document medical clearance for possible podiatric surgical intervention under anesthesia   - Discussed with attending

## 2024-07-28 NOTE — PROGRESS NOTE ADULT - SUBJECTIVE AND OBJECTIVE BOX
Medicine Progress Note    Patient is a 88y old  Male who presents with a chief complaint of agitation (28 Jul 2024 12:43)      SUBJECTIVE / OVERNIGHT EVENTS: no events over night     ADDITIONAL REVIEW OF SYSTEMS: negative     MEDICATIONS  (STANDING):  aMIOdarone    Tablet 100 milliGRAM(s) Oral daily  apixaban 2.5 milliGRAM(s) Oral two times a day  aspirin  chewable 81 milliGRAM(s) Oral daily  atorvastatin 20 milliGRAM(s) Oral at bedtime  divalproex  milliGRAM(s) Oral every 8 hours  melatonin 3 milliGRAM(s) Oral at bedtime  metoprolol succinate ER 25 milliGRAM(s) Oral daily  midodrine. 10 milliGRAM(s) Oral <User Schedule>  mupirocin 2% Ointment 1 Application(s) Topical <User Schedule>  risperiDONE   Tablet 0.25 milliGRAM(s) Oral <User Schedule>  risperiDONE   Tablet 0.5 milliGRAM(s) Oral <User Schedule>  vancomycin  IVPB 1250 milliGRAM(s) IV Intermittent every 24 hours    MEDICATIONS  (PRN):  acetaminophen     Tablet .. 650 milliGRAM(s) Oral every 6 hours PRN Temp greater or equal to 38C (100.4F), Mild Pain (1 - 3)  OLANZapine Injectable 1.25 milliGRAM(s) IntraMuscular every 6 hours PRN aggression    CAPILLARY BLOOD GLUCOSE        I&O's Summary      PHYSICAL EXAM:  Vital Signs Last 24 Hrs  T(C): 36.8 (28 Jul 2024 11:30), Max: 36.8 (28 Jul 2024 11:30)  T(F): 98.2 (28 Jul 2024 11:30), Max: 98.2 (28 Jul 2024 11:30)  HR: 75 (28 Jul 2024 16:45) (72 - 85)  BP: 155/92 (28 Jul 2024 16:45) (147/95 - 177/91)  BP(mean): --  RR: 18 (28 Jul 2024 11:30) (18 - 18)  SpO2: 99% (28 Jul 2024 11:30) (99% - 100%)    Parameters below as of 28 Jul 2024 11:30  Patient On (Oxygen Delivery Method): room air      CONSTITUTIONAL: NAD,  ENMT: Moist oral mucosa, no pharyngeal injection or exudates;   RESPIRATORY: Normal respiratory effort; lungs are clear to auscultation bilaterally  CARDIOVASCULAR: Regular rate and rhythm, normal S1 and S2,  No lower extremity edema; RT foot wound, dressing   ABDOMEN: Nontender to palpation, normoactive bowel sounds, no rebound/guarding;   PSYCH: A+O to person,   NEUROLOGY: CN 2-12 are intact and symmetric; no gross sensory deficits   SKIN: No rashes;     LABS:                        13.9   8.47  )-----------( 237      ( 28 Jul 2024 06:19 )             42.7     07-28    138  |  106  |  18  ----------------------------<  80  4.7   |  17<L>  |  1.22    Ca    9.2      28 Jul 2024 06:19  Phos  3.2     07-27  Mg     2.20     07-28    TPro  6.2  /  Alb  3.4  /  TBili  0.7  /  DBili  x   /  AST  27  /  ALT  20  /  AlkPhos  87  07-27          Urinalysis Basic - ( 28 Jul 2024 06:19 )    Color: x / Appearance: x / SG: x / pH: x  Gluc: 80 mg/dL / Ketone: x  / Bili: x / Urobili: x   Blood: x / Protein: x / Nitrite: x   Leuk Esterase: x / RBC: x / WBC x   Sq Epi: x / Non Sq Epi: x / Bacteria: x            RADIOLOGY & ADDITIONAL TESTS:  Imaging from Last 24 Hours:    Electrocardiogram/QTc Interval:    COORDINATION OF CARE:  Care Discussed with Consultants/Other Providers: ACP

## 2024-07-28 NOTE — BH CONSULTATION LIAISON PROGRESS NOTE - NSBHFUPINTERVALHXFT_PSY_A_CORE
Chart reviewed. Pt received no PRNs last night. Patient remains in restraints/mits. On assessment today he is tired appearing, disoriented, calm. No evidence of rigidity in UEs. Unable to engage in meaningful interview.
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
## 2024-07-28 NOTE — PROGRESS NOTE ADULT - SUBJECTIVE AND OBJECTIVE BOX
Percy Teran MD  Interventional Cardiology / Endovascular Specialist  [place] Office : [address] [place] [address] [place]. [address]  Tel:   [place] Office : [address] [facility] [place] [address]  Tel: [phone]    Pt is lying in bed, NAD  	  MEDICATIONS:  aMIOdarone    Tablet 100 milliGRAM(s) Oral daily  apixaban 2.5 milliGRAM(s) Oral two times a day  aspirin  chewable 81 milliGRAM(s) Oral daily  metoprolol succinate ER 25 milliGRAM(s) Oral daily  midodrine. 10 milliGRAM(s) Oral <User Schedule>    vancomycin  IVPB 1250 milliGRAM(s) IV Intermittent every 24 hours      acetaminophen     Tablet .. 650 milliGRAM(s) Oral every 6 hours PRN  divalproex  milliGRAM(s) Oral every 8 hours  melatonin 3 milliGRAM(s) Oral at bedtime  OLANZapine Injectable 1.25 milliGRAM(s) IntraMuscular every 6 hours PRN  risperiDONE   Tablet 0.25 milliGRAM(s) Oral <User Schedule>  risperiDONE   Tablet 0.5 milliGRAM(s) Oral <User Schedule>      atorvastatin 20 milliGRAM(s) Oral at bedtime    mupirocin 2% Ointment 1 Application(s) Topical <User Schedule>      PAST MEDICAL/SURGICAL HISTORY  PAST MEDICAL & SURGICAL HISTORY:  Bacterial UTI      Atrial fibrillation      HTN (hypertension)      Dementia          SOCIAL HISTORY: Substance Use (street drugs): ( x ) never used  (  ) other:    FAMILY HISTORY:      PHYSICAL EXAM:  T(C): 36.8 (07-28-24 @ 11:30), Max: 36.8 (07-28-24 @ 11:30)  HR: 85 (07-28-24 @ 11:30) (72 - 85)  BP: 147/95 (07-28-24 @ 11:30) (138/86 - 177/91)  RR: 18 (07-28-24 @ 11:30) (18 - 18)  SpO2: 99% (07-28-24 @ 11:30) (99% - 100%)  Wt(kg): --  I&O's Summary      GENERAL: NAD  EYES: conjunctiva and sclera clear  ENMT: No tonsillar erythema, exudates, or enlargement  Cardiovascular: Normal S1 S2, No JVD, No murmurs, No edema  Respiratory: Lungs clear to auscultation	  Gastrointestinal:  Soft, Non-tender, + BS	  Extremities: No edema                            13.9   8.47  )-----------( 237      ( 28 Jul 2024 06:19 )             42.7     07-28    138  |  106  |  18  ----------------------------<  80  4.7   |  17<L>  |  1.22    Ca    9.2      28 Jul 2024 06:19  Phos  3.2     07-27  Mg     2.20     07-28    TPro  6.2  /  Alb  3.4  /  TBili  0.7  /  DBili  x   /  AST  27  /  ALT  20  /  AlkPhos  87  07-27    proBNP:   Lipid Profile:   HgA1c:   TSH:     Consultant(s) Notes Reviewed:  [x ] YES  [ ] NO    Care Discussed with Consultants/Other Providers [ x] YES  [ ] NO    Imaging Personally Reviewed independently:  [x] YES  [ ] NO    All labs, radiologic studies, vitals, orders and medications list reviewed. Patient is seen and examined at bedside. Case discussed with medical team.

## 2024-07-28 NOTE — PROGRESS NOTE ADULT - PROBLEM SELECTOR PLAN 2
Pt w/ R foot wound  XR w/o concern for OM  Pending SHERYL/PVR per podiatry recs  Pending MR R foot to r/o OM  EP Consulted for clearance as pt w/ pacemaker  s/p EP clearance. Form faxed to MRI. Pending MRI approval prior to placing order  wound culture : staph, switched Unasyn to vanc   F/w MRI , consider ID consult after MRI

## 2024-07-29 DIAGNOSIS — Z86.73 PERSONAL HISTORY OF TRANSIENT ISCHEMIC ATTACK (TIA), AND CEREBRAL INFARCTION WITHOUT RESIDUAL DEFICITS: ICD-10-CM

## 2024-07-29 DIAGNOSIS — C67.9 MALIGNANT NEOPLASM OF BLADDER, UNSPECIFIED: ICD-10-CM

## 2024-07-29 DIAGNOSIS — R79.89 OTHER SPECIFIED ABNORMAL FINDINGS OF BLOOD CHEMISTRY: ICD-10-CM

## 2024-07-29 LAB
A1C WITH ESTIMATED AVERAGE GLUCOSE RESULT: 5.2 % — SIGNIFICANT CHANGE UP (ref 4–5.6)
ADD ON TEST-SPECIMEN IN LAB: SIGNIFICANT CHANGE UP
ADD ON TEST-SPECIMEN IN LAB: SIGNIFICANT CHANGE UP
ALBUMIN SERPL ELPH-MCNC: 3.4 G/DL — SIGNIFICANT CHANGE UP (ref 3.3–5)
ALP SERPL-CCNC: 78 U/L — SIGNIFICANT CHANGE UP (ref 40–120)
ALT FLD-CCNC: 27 U/L — SIGNIFICANT CHANGE UP (ref 4–41)
AMMONIA BLD-MCNC: 21 UMOL/L — SIGNIFICANT CHANGE UP (ref 11–55)
ANION GAP SERPL CALC-SCNC: 10 MMOL/L — SIGNIFICANT CHANGE UP (ref 7–14)
AST SERPL-CCNC: 30 U/L — SIGNIFICANT CHANGE UP (ref 4–40)
BILIRUB SERPL-MCNC: 0.6 MG/DL — SIGNIFICANT CHANGE UP (ref 0.2–1.2)
BUN SERPL-MCNC: 16 MG/DL — SIGNIFICANT CHANGE UP (ref 7–23)
CALCIUM SERPL-MCNC: 9.1 MG/DL — SIGNIFICANT CHANGE UP (ref 8.4–10.5)
CHLORIDE SERPL-SCNC: 104 MMOL/L — SIGNIFICANT CHANGE UP (ref 98–107)
CHOLEST SERPL-MCNC: 111 MG/DL — SIGNIFICANT CHANGE UP
CO2 SERPL-SCNC: 24 MMOL/L — SIGNIFICANT CHANGE UP (ref 22–31)
CREAT SERPL-MCNC: 1.26 MG/DL — SIGNIFICANT CHANGE UP (ref 0.5–1.3)
EGFR: 55 ML/MIN/1.73M2 — LOW
ESTIMATED AVERAGE GLUCOSE: 103 — SIGNIFICANT CHANGE UP
GLUCOSE SERPL-MCNC: 91 MG/DL — SIGNIFICANT CHANGE UP (ref 70–99)
HCT VFR BLD CALC: 39.5 % — SIGNIFICANT CHANGE UP (ref 39–50)
HDLC SERPL-MCNC: 44 MG/DL — SIGNIFICANT CHANGE UP
HGB BLD-MCNC: 13.3 G/DL — SIGNIFICANT CHANGE UP (ref 13–17)
LIPID PNL WITH DIRECT LDL SERPL: 55 MG/DL — SIGNIFICANT CHANGE UP
MAGNESIUM SERPL-MCNC: 2 MG/DL — SIGNIFICANT CHANGE UP (ref 1.6–2.6)
MCHC RBC-ENTMCNC: 29.8 PG — SIGNIFICANT CHANGE UP (ref 27–34)
MCHC RBC-ENTMCNC: 33.7 GM/DL — SIGNIFICANT CHANGE UP (ref 32–36)
MCV RBC AUTO: 88.6 FL — SIGNIFICANT CHANGE UP (ref 80–100)
NON HDL CHOLESTEROL: 67 MG/DL — SIGNIFICANT CHANGE UP
NRBC # BLD: 0 /100 WBCS — SIGNIFICANT CHANGE UP (ref 0–0)
NRBC # FLD: 0 K/UL — SIGNIFICANT CHANGE UP (ref 0–0)
PLATELET # BLD AUTO: 238 K/UL — SIGNIFICANT CHANGE UP (ref 150–400)
POTASSIUM SERPL-MCNC: 3.8 MMOL/L — SIGNIFICANT CHANGE UP (ref 3.5–5.3)
POTASSIUM SERPL-SCNC: 3.8 MMOL/L — SIGNIFICANT CHANGE UP (ref 3.5–5.3)
PROT SERPL-MCNC: 6.4 G/DL — SIGNIFICANT CHANGE UP (ref 6–8.3)
RBC # BLD: 4.46 M/UL — SIGNIFICANT CHANGE UP (ref 4.2–5.8)
RBC # FLD: 14.2 % — SIGNIFICANT CHANGE UP (ref 10.3–14.5)
SODIUM SERPL-SCNC: 138 MMOL/L — SIGNIFICANT CHANGE UP (ref 135–145)
T4 FREE SERPL-MCNC: 1.4 NG/DL — SIGNIFICANT CHANGE UP (ref 0.9–1.8)
TRIGL SERPL-MCNC: 60 MG/DL — SIGNIFICANT CHANGE UP
TSH SERPL-MCNC: 13.5 UIU/ML — HIGH (ref 0.27–4.2)
VALPROATE SERPL-MCNC: 43.6 UG/ML — LOW (ref 50–100)
WBC # BLD: 7.05 K/UL — SIGNIFICANT CHANGE UP (ref 3.8–10.5)
WBC # FLD AUTO: 7.05 K/UL — SIGNIFICANT CHANGE UP (ref 3.8–10.5)

## 2024-07-29 PROCEDURE — 93010 ELECTROCARDIOGRAM REPORT: CPT

## 2024-07-29 PROCEDURE — 99232 SBSQ HOSP IP/OBS MODERATE 35: CPT

## 2024-07-29 PROCEDURE — 71045 X-RAY EXAM CHEST 1 VIEW: CPT | Mod: 26

## 2024-07-29 PROCEDURE — 99221 1ST HOSP IP/OBS SF/LOW 40: CPT

## 2024-07-29 RX ORDER — RISPERIDONE 1 MG/1
0.25 TABLET, FILM COATED ORAL ONCE
Refills: 0 | Status: COMPLETED | OUTPATIENT
Start: 2024-07-29 | End: 2024-07-29

## 2024-07-29 RX ORDER — RISPERIDONE 1 MG/1
0.75 TABLET, FILM COATED ORAL
Refills: 0 | Status: DISCONTINUED | OUTPATIENT
Start: 2024-07-29 | End: 2024-07-30

## 2024-07-29 RX ORDER — CEFAZOLIN SODIUM 10 G
2000 VIAL (EA) INJECTION ONCE
Refills: 0 | Status: COMPLETED | OUTPATIENT
Start: 2024-07-29 | End: 2024-07-29

## 2024-07-29 RX ORDER — RISPERIDONE 1 MG/1
0.5 TABLET, FILM COATED ORAL
Refills: 0 | Status: DISCONTINUED | OUTPATIENT
Start: 2024-07-30 | End: 2024-07-30

## 2024-07-29 RX ORDER — CEFAZOLIN SODIUM 10 G
2000 VIAL (EA) INJECTION EVERY 8 HOURS
Refills: 0 | Status: DISCONTINUED | OUTPATIENT
Start: 2024-07-29 | End: 2024-08-06

## 2024-07-29 RX ORDER — CEFAZOLIN SODIUM 10 G
VIAL (EA) INJECTION
Refills: 0 | Status: DISCONTINUED | OUTPATIENT
Start: 2024-07-29 | End: 2024-08-06

## 2024-07-29 RX ORDER — POTASSIUM CHLORIDE 1500 MG/1
20 TABLET, EXTENDED RELEASE ORAL ONCE
Refills: 0 | Status: COMPLETED | OUTPATIENT
Start: 2024-07-29 | End: 2024-07-29

## 2024-07-29 RX ADMIN — DIVALPROEX SODIUM 250 MILLIGRAM(S): 125 CAPSULE, COATED PELLETS ORAL at 05:53

## 2024-07-29 RX ADMIN — RISPERIDONE 0.25 MILLIGRAM(S): 1 TABLET, FILM COATED ORAL at 06:09

## 2024-07-29 RX ADMIN — POTASSIUM CHLORIDE 20 MILLIEQUIVALENT(S): 1500 TABLET, EXTENDED RELEASE ORAL at 15:00

## 2024-07-29 RX ADMIN — MUPIROCIN CALCIUM 1 APPLICATION(S): 20 CREAM TOPICAL at 07:59

## 2024-07-29 RX ADMIN — Medication 25 MILLIGRAM(S): at 05:52

## 2024-07-29 RX ADMIN — RISPERIDONE 0.75 MILLIGRAM(S): 1 TABLET, FILM COATED ORAL at 16:18

## 2024-07-29 RX ADMIN — Medication 100 MILLIGRAM(S): at 14:53

## 2024-07-29 RX ADMIN — Medication 81 MILLIGRAM(S): at 11:15

## 2024-07-29 RX ADMIN — APIXABAN 2.5 MILLIGRAM(S): 5 TABLET, FILM COATED ORAL at 16:18

## 2024-07-29 RX ADMIN — AMIODARONE HYDROCHLORIDE 100 MILLIGRAM(S): 50 INJECTION, SOLUTION INTRAVENOUS at 05:53

## 2024-07-29 RX ADMIN — APIXABAN 2.5 MILLIGRAM(S): 5 TABLET, FILM COATED ORAL at 05:53

## 2024-07-29 RX ADMIN — Medication 3 MILLIGRAM(S): at 21:21

## 2024-07-29 RX ADMIN — DIVALPROEX SODIUM 250 MILLIGRAM(S): 125 CAPSULE, COATED PELLETS ORAL at 21:21

## 2024-07-29 RX ADMIN — ATORVASTATIN CALCIUM 20 MILLIGRAM(S): 40 TABLET, FILM COATED ORAL at 21:21

## 2024-07-29 RX ADMIN — DIVALPROEX SODIUM 250 MILLIGRAM(S): 125 CAPSULE, COATED PELLETS ORAL at 13:57

## 2024-07-29 RX ADMIN — Medication 100 MILLIGRAM(S): at 21:21

## 2024-07-29 RX ADMIN — RISPERIDONE 0.25 MILLIGRAM(S): 1 TABLET, FILM COATED ORAL at 11:18

## 2024-07-29 NOTE — PROGRESS NOTE ADULT - ASSESSMENT
EKG V Paced QTc 562     Assessment and Plan     1) QT prolongation : 2/2 Paced rhythm corrected QTc on 7/29 around 450ms , ok to continue Antipsychotics repeat EKG in 2 days     2) Afib : cw metoprolol and eliquis and amiodarone    3) DVT PPX eliquis

## 2024-07-29 NOTE — CONSULT NOTE ADULT - NEUROLOGICAL
Patient: Saroj Griggs    * No procedures listed *    Diagnosis:* No pre-op diagnosis entered *  Diagnosis Additional Information: No value filed.    Anesthesia Type:  No value filed.    Note:  Anesthesia Post Evaluation         Comments:     S/P epidural for labor.   I or my partner was immediately available for management of this patient during epidural analgesia infusion.  VSS.  Doing well. Block resolved.  Neuro at baseline. Denies positional headache. Minimal side effects easily managed w/ PRN meds. No apparent anesthetic complications. No follow-up required.    Denzel García MD        Last vitals:  Vitals:    12/03/19 2322 12/04/19 0000 12/04/19 0200   BP:  114/69 111/79   Pulse:   86   Resp: 16  20   Temp: 97.9  F (36.6  C)  98.2  F (36.8  C)   SpO2:            Electronically Signed By: Denzel García MD  December 4, 2019  8:35 AM   not applicable

## 2024-07-29 NOTE — PROGRESS NOTE ADULT - SUBJECTIVE AND OBJECTIVE BOX
Percy Teran MD  Interventional Cardiology / Advance Heart Failure and Cardiac Transplant Specialist  [place] Office : [phone] [place] [address] [facility] [address]  Tel: [phone]  [place] Office : [address] [facility] [place] [address]  Tel: [phone]       Pt is lying in bed comfortable not in distress, no chest pains no SOB no palpitations  	  MEDICATIONS:  aMIOdarone    Tablet 100 milliGRAM(s) Oral daily  apixaban 2.5 milliGRAM(s) Oral two times a day  aspirin  chewable 81 milliGRAM(s) Oral daily  metoprolol succinate ER 25 milliGRAM(s) Oral daily    ceFAZolin   IVPB      ceFAZolin   IVPB 2000 milliGRAM(s) IV Intermittent every 8 hours      acetaminophen     Tablet .. 650 milliGRAM(s) Oral every 6 hours PRN  divalproex  milliGRAM(s) Oral every 8 hours  melatonin 3 milliGRAM(s) Oral at bedtime  OLANZapine Injectable 1.25 milliGRAM(s) IntraMuscular every 6 hours PRN  OLANZapine Injectable 1.25 milliGRAM(s) IntraMuscular once PRN  risperiDONE   Tablet 0.75 milliGRAM(s) Oral <User Schedule>      atorvastatin 20 milliGRAM(s) Oral at bedtime    mupirocin 2% Ointment 1 Application(s) Topical <User Schedule>      PAST MEDICAL/SURGICAL HISTORY  PAST MEDICAL & SURGICAL HISTORY:  Bacterial UTI      Atrial fibrillation      HTN (hypertension)      Dementia          SOCIAL HISTORY: Substance Use (street drugs): ( x ) never used  (  ) other:    FAMILY HISTORY:         PHYSICAL EXAM:  T(C): 36.4 (07-29-24 @ 20:35), Max: 36.9 (07-29-24 @ 05:05)  HR: 92 (07-29-24 @ 20:35) (79 - 92)  BP: 112/66 (07-29-24 @ 20:35) (112/66 - 144/83)  RR: 18 (07-29-24 @ 20:35) (18 - 18)  SpO2: 97% (07-29-24 @ 20:35) (97% - 98%)  Wt(kg): --  I&O's Summary        GENERAL: NAD  EYES:   PERRLA   ENMT:   Moist mucous membranes, Good dentition, No lesions  Cardiovascular: Normal S1 S2, No JVD, No murmurs, No edema  Respiratory: Lungs clear to auscultation	  Gastrointestinal:  Soft, Non-tender, + BS	  Extremities: b/l venous stasis ulcers                                    13.3   7.05  )-----------( 238      ( 29 Jul 2024 04:25 )             39.5     07-29    138  |  104  |  16  ----------------------------<  91  3.8   |  24  |  1.26    Ca    9.1      29 Jul 2024 04:25  Mg     2.00     07-29    TPro  6.4  /  Alb  3.4  /  TBili  0.6  /  DBili  x   /  AST  30  /  ALT  27  /  AlkPhos  78  07-29    proBNP:   Lipid Profile:   HgA1c:   TSH: Thyroid Stimulating Hormone, Serum: 13.50 uIU/mL (07-29 @ 04:25)      Consultant(s) Notes Reviewed:  [x ] YES  [ ] NO    Care Discussed with Consultants/Other Providers [ x] YES  [ ] NO    Imaging Personally Reviewed independently:  [x] YES  [ ] NO    All labs, radiologic studies, vitals, orders and medications list reviewed. Patient is seen and examined at bedside. Case discussed with medical team.

## 2024-07-29 NOTE — PROGRESS NOTE ADULT - ASSESSMENT
88M dementia, HTN, CHF, afib on Eliquis, PPM, bladder neoplasm, recently started on Xanax for anxiety last week presents for increased agitation and R foot wound.

## 2024-07-29 NOTE — PROGRESS NOTE ADULT - PROBLEM SELECTOR PLAN 1
R foot wound  - ESR 15, CRP 12  - podiatry following  - SHERYL/PVR 7/27:  1. Right: Right SHERYL was not accurately assessed due to non-compressible (calcific) vessels. Although right digit waveforms appear moderately diminished in amplitude, the TBI is normal (0.83) with a toe pressure of 124 mmHg. 2. Left: Left SHERYL was not accurately assessed due to non-compressible (calcific) vessels. Although left digit waveforms appear moderately diminished in amplitude, the TBI is normal (0.72) with a toe pressure of 108 mmHg.  - MR R foot to r/o OM, s/p EP clearance, form was faxed to MRI, will need to f/u with MRI if cleared   - vascular consulted per podiatry  - wound culture 7/24: MSSA  - c/w abx Unasyn (7/26-7/27)-->vancomycin (7/27-7/28)-->cefazolin (7/29-
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
## 2024-07-29 NOTE — CONSULT NOTE ADULT - PROBLEM SELECTOR RECOMMENDATION 9
I Vlad Wilson MD have participated in the daily care of this patient and discussed  the findings and plan with the house officer. I reviewed the resident note and agree with the findings and plan

## 2024-07-29 NOTE — CONSULT NOTE ADULT - CRANIAL NERVE
moderate arterial insuff w moderate  trophic skin changes  Pulse exam                         right         left   femoral        +2            +2  dpa                +1            +1  pta                 +12            +12  RLE toe 2 dorsum ulcer w eschar 4mm diam

## 2024-07-29 NOTE — CONSULT NOTE ADULT - ASSESSMENT
89 yo M, hx of A/ fib on Eliquis, HTN, CHF, pacemaker, bladder neoplasm, noted to have RLE 2nd toe wound. Vascular surgery consulted for further recommendations     Recommendations   - SHERYL/PVR reviewed. Will perform possible angiogram pending clearances  - please document medical/cardiac optimization for RLE angiogram, possible angioplasty, possible stent   - continue local wound care  - f/u podiatry recs  - appreciate care per primary team     Final plan pending attending review.     Collins Leblanc PGY-2  Surgery C Team  [id number]  89 yo M, hx of A/ fib on Eliquis, HTN, CHF, pacemaker, bladder neoplasm, noted to have RLE 2nd toe wound. Vascular surgery consulted for further recommendations     Recommendations   - unclear if pt is ambulatory  recommend pt/ot eval for ambulation and wt bearing   - continue local wound care  - f/u podiatry recs  - appreciate care per primary team     Collins Leblanc PGY-2  Surgery C Team  [id number]

## 2024-07-29 NOTE — CONSULT NOTE ADULT - SUBJECTIVE AND OBJECTIVE BOX
General Surgery Consult  Consulting surgical team: Vascular surgery   Consulting attending: Katie  Patient seen and examined: 07-29-24 @ 20:42    HPI:  87 yo M, hx of A/ fib on Eliquis, HTN, CHF, pacemaker, bladder neoplasm, was noted to have a R toe wound. Vascular surgery was consulted for further recommendations given possible podiatry intervention.    PAST MEDICAL HISTORY:  Bacterial UTI    Atrial fibrillation    HTN (hypertension)    Dementia        PAST SURGICAL HISTORY:      ALLERGIES:  No Known Allergies      MEDICATIONS:  acetaminophen     Tablet .. 650 milliGRAM(s) Oral every 6 hours PRN  aMIOdarone    Tablet 100 milliGRAM(s) Oral daily  apixaban 2.5 milliGRAM(s) Oral two times a day  aspirin  chewable 81 milliGRAM(s) Oral daily  atorvastatin 20 milliGRAM(s) Oral at bedtime  ceFAZolin   IVPB      ceFAZolin   IVPB 2000 milliGRAM(s) IV Intermittent every 8 hours  divalproex  milliGRAM(s) Oral every 8 hours  melatonin 3 milliGRAM(s) Oral at bedtime  metoprolol succinate ER 25 milliGRAM(s) Oral daily  mupirocin 2% Ointment 1 Application(s) Topical <User Schedule>  OLANZapine Injectable 1.25 milliGRAM(s) IntraMuscular once PRN  OLANZapine Injectable 1.25 milliGRAM(s) IntraMuscular every 6 hours PRN  risperiDONE   Tablet 0.75 milliGRAM(s) Oral <User Schedule>      VITALS & I/Os:  Vital Signs Last 24 Hrs  T(C): 36.4 (29 Jul 2024 20:35), Max: 36.9 (29 Jul 2024 05:05)  T(F): 97.6 (29 Jul 2024 20:35), Max: 98.5 (29 Jul 2024 05:05)  HR: 92 (29 Jul 2024 20:35) (79 - 92)  BP: 112/66 (29 Jul 2024 20:35) (112/66 - 144/83)  BP(mean): --  RR: 18 (29 Jul 2024 20:35) (18 - 18)  SpO2: 97% (29 Jul 2024 20:35) (97% - 100%)    Parameters below as of 29 Jul 2024 20:35  Patient On (Oxygen Delivery Method): room air        I&O's Summary      PHYSICAL EXAM:  General: No acute distress  Respiratory: Nonlabored  Cardiovascular: RRR  Extremities: RLE 2nd digit wound dry, no purulence, b/l femoral, pop pulses, RLE DP signal PT pulse, LLE DP signal PT signal    LABS:                        13.3   7.05  )-----------( 238      ( 29 Jul 2024 04:25 )             39.5     07-29    138  |  104  |  16  ----------------------------<  91  3.8   |  24  |  1.26    Ca    9.1      29 Jul 2024 04:25  Mg     2.00     07-29    TPro  6.4  /  Alb  3.4  /  TBili  0.6  /  DBili  x   /  AST  30  /  ALT  27  /  AlkPhos  78  07-29    Lactate:              Urinalysis Basic - ( 29 Jul 2024 04:25 )    Color: x / Appearance: x / SG: x / pH: x  Gluc: 91 mg/dL / Ketone: x  / Bili: x / Urobili: x   Blood: x / Protein: x / Nitrite: x   Leuk Esterase: x / RBC: x / WBC x   Sq Epi: x / Non Sq Epi: x / Bacteria: x        IMAGING:                                                                                                 General Surgery Consult  Consulting surgical team: Vascular surgery   Consulting attending: Katie  Patient seen and examined: 07-29-24 @ 20:42    HPI:  89 yo M, hx of A/ fib on Eliquis, HTN, CHF, pacemaker, bladder neoplasm, was noted to have a R toe wound. Vascular surgery was consulted for further recommendations given possible podiatry intervention.    PAST MEDICAL HISTORY:  Bacterial UTI    Atrial fibrillation    HTN (hypertension)    Dementia        PAST SURGICAL HISTORY:      ALLERGIES:  No Known Allergies      MEDICATIONS:  acetaminophen     Tablet .. 650 milliGRAM(s) Oral every 6 hours PRN  aMIOdarone    Tablet 100 milliGRAM(s) Oral daily  apixaban 2.5 milliGRAM(s) Oral two times a day  aspirin  chewable 81 milliGRAM(s) Oral daily  atorvastatin 20 milliGRAM(s) Oral at bedtime  ceFAZolin   IVPB      ceFAZolin   IVPB 2000 milliGRAM(s) IV Intermittent every 8 hours  divalproex  milliGRAM(s) Oral every 8 hours  melatonin 3 milliGRAM(s) Oral at bedtime  metoprolol succinate ER 25 milliGRAM(s) Oral daily  mupirocin 2% Ointment 1 Application(s) Topical <User Schedule>  OLANZapine Injectable 1.25 milliGRAM(s) IntraMuscular once PRN  OLANZapine Injectable 1.25 milliGRAM(s) IntraMuscular every 6 hours PRN  risperiDONE   Tablet 0.75 milliGRAM(s) Oral <User Schedule>      VITALS & I/Os:  Vital Signs Last 24 Hrs  T(C): 36.4 (29 Jul 2024 20:35), Max: 36.9 (29 Jul 2024 05:05)  T(F): 97.6 (29 Jul 2024 20:35), Max: 98.5 (29 Jul 2024 05:05)  HR: 92 (29 Jul 2024 20:35) (79 - 92)  BP: 112/66 (29 Jul 2024 20:35) (112/66 - 144/83)  BP(mean): --  RR: 18 (29 Jul 2024 20:35) (18 - 18)  SpO2: 97% (29 Jul 2024 20:35) (97% - 100%)    Parameters below as of 29 Jul 2024 20:35  Patient On (Oxygen Delivery Method): room air        I&O's Summary      PHYSICAL EXAM:  General: No acute distress  Respiratory: Nonlabored  Cardiovascular: RRR  Extremities: RLE 2nd digit wound dry, no purulence, b/l femoral, pop pulses, RLE DP signal PT pulse, LLE DP signal PT signal    LABS:                        13.3   7.05  )-----------( 238      ( 29 Jul 2024 04:25 )             39.5     07-29    138  |  104  |  16  ----------------------------<  91  3.8   |  24  |  1.26    Ca    9.1      29 Jul 2024 04:25  Mg     2.00     07-29    TPro  6.4  /  Alb  3.4  /  TBili  0.6  /  DBili  x   /  AST  30  /  ALT  27  /  AlkPhos  78  07-29    Lactate:              Urinalysis Basic - ( 29 Jul 2024 04:25 )    Color: x / Appearance: x / SG: x / pH: x  Gluc: 91 mg/dL / Ketone: x  / Bili: x / Urobili: x   Blood: x / Protein: x / Nitrite: x   Leuk Esterase: x / RBC: x / WBC x   Sq Epi: x / Non Sq Epi: x / Bacteria: x

## 2024-07-29 NOTE — PROGRESS NOTE ADULT - SUBJECTIVE AND OBJECTIVE BOX
Podiatry pager #: [phone] ([place])/ [phone] ([facility])    Patient is a 88y old  Male who presents with a chief complaint of agitation (28 Jul 2024 18:51)       INTERVAL HPI/OVERNIGHT EVENTS:  Patient seen and evaluated at bedside.  Pt is resting comfortable in NAD. Denies N/V/F/C.     Allergies    No Known Allergies    Intolerances        Vital Signs Last 24 Hrs  T(C): 36.9 (29 Jul 2024 05:05), Max: 36.9 (29 Jul 2024 05:05)  T(F): 98.5 (29 Jul 2024 05:05), Max: 98.5 (29 Jul 2024 05:05)  HR: 79 (29 Jul 2024 05:05) (75 - 90)  BP: 144/83 (29 Jul 2024 05:05) (143/79 - 155/92)  BP(mean): --  RR: 18 (29 Jul 2024 05:05) (18 - 18)  SpO2: 98% (29 Jul 2024 05:05) (98% - 100%)    Parameters below as of 29 Jul 2024 05:05  Patient On (Oxygen Delivery Method): room air        LABS:                        13.3   7.05  )-----------( 238      ( 29 Jul 2024 04:25 )             39.5     07-29    138  |  104  |  16  ----------------------------<  91  3.8   |  24  |  1.26    Ca    9.1      29 Jul 2024 04:25  Mg     2.00     07-29    TPro  6.4  /  Alb  3.4  /  TBili  0.6  /  DBili  x   /  AST  30  /  ALT  27  /  AlkPhos  78  07-29      Urinalysis Basic - ( 29 Jul 2024 04:25 )    Color: x / Appearance: x / SG: x / pH: x  Gluc: 91 mg/dL / Ketone: x  / Bili: x / Urobili: x   Blood: x / Protein: x / Nitrite: x   Leuk Esterase: x / RBC: x / WBC x   Sq Epi: x / Non Sq Epi: x / Bacteria: x      CAPILLARY BLOOD GLUCOSE          Lower Extremity Physical Exam:  Vascular: DP/PT 2/4, B/L, CFT <3 seconds B/L, Temperature gradient  warm to cool, B/L.   Neuro: Epicritic sensation diminished to the level of digits, B/L.  Musculoskeletal/Ortho: Limited ankle ROM, Hammertoe deformity of the right 2nd and 3rd digit  Skin: Right foot second dorsal proximal interphalangeal joint wound to bone with granular wound borde, edema improved, erythema resolved, , no purulence, no malodor. Left foot no open wound and no signs of acute infection.   RADIOLOGY & ADDITIONAL TESTS:   Podiatry pager #: [phone] ([place])/ [phone] ([facility])    Patient is a 88y old  Male who presents with a chief complaint of agitation (28 Jul 2024 18:51)       INTERVAL HPI/OVERNIGHT EVENTS:  Patient seen and evaluated at bedside.  Pt is resting comfortable in NAD.      Allergies    No Known Allergies    Intolerances        Vital Signs Last 24 Hrs  T(C): 36.9 (29 Jul 2024 05:05), Max: 36.9 (29 Jul 2024 05:05)  T(F): 98.5 (29 Jul 2024 05:05), Max: 98.5 (29 Jul 2024 05:05)  HR: 79 (29 Jul 2024 05:05) (75 - 90)  BP: 144/83 (29 Jul 2024 05:05) (143/79 - 155/92)  BP(mean): --  RR: 18 (29 Jul 2024 05:05) (18 - 18)  SpO2: 98% (29 Jul 2024 05:05) (98% - 100%)    Parameters below as of 29 Jul 2024 05:05  Patient On (Oxygen Delivery Method): room air        LABS:                        13.3   7.05  )-----------( 238      ( 29 Jul 2024 04:25 )             39.5     07-29    138  |  104  |  16  ----------------------------<  91  3.8   |  24  |  1.26    Ca    9.1      29 Jul 2024 04:25  Mg     2.00     07-29    TPro  6.4  /  Alb  3.4  /  TBili  0.6  /  DBili  x   /  AST  30  /  ALT  27  /  AlkPhos  78  07-29      Urinalysis Basic - ( 29 Jul 2024 04:25 )    Color: x / Appearance: x / SG: x / pH: x  Gluc: 91 mg/dL / Ketone: x  / Bili: x / Urobili: x   Blood: x / Protein: x / Nitrite: x   Leuk Esterase: x / RBC: x / WBC x   Sq Epi: x / Non Sq Epi: x / Bacteria: x      CAPILLARY BLOOD GLUCOSE          Lower Extremity Physical Exam:  Vascular: DP/PT 2/4, B/L, CFT <3 seconds B/L, Temperature gradient  warm to cool, B/L.   Neuro: Epicritic sensation diminished to the level of digits, B/L.  Musculoskeletal/Ortho: Limited ankle ROM, Hammertoe deformity of the right 2nd and 3rd digit  Skin: Right foot second dorsal proximal interphalangeal joint wound to bone with granular wound borde, edema improved, erythema resolved, , no purulence, no malodor. Left foot no open wound and no signs of acute infection.   RADIOLOGY & ADDITIONAL TESTS:

## 2024-07-29 NOTE — PROGRESS NOTE ADULT - ASSESSMENT
88M with right 2nd digit dorsal wound with cellulitis  - Patient was evaluated and seen  - Afebrile, no leukocytosis   - Right foot second dorsal proximal interphalangeal joint wound to bone with granular wound borde, edema improved, erythema resolved, , no purulence, no malodor. Left foot no open wound and no signs of acute infection.   -  Right foot wound culture: MSSA (prelim).  - Right foot xray: No OM, no gas  -SHERYL/PVR : RABI  and LABI was not accurately assessed due to non-compressible (calcific) vessels. RTBI 0.83, LTBI 0.72  -recommending vasc consult   - Right foot MRI pending EP pacemaker clearance  - Recommend discontinue Vancomycin and start Unasyn.   - ID consult pending    - Podiatry plan: Local wound care  vs 2nd toe amputation pending MRI, vascular recs, family decision.   - Please document medical clearance for possible podiatric surgical intervention under anesthesia   - Discussed with attending 88M with right 2nd digit dorsal wound with cellulitis  - Patient was evaluated and seen  - Afebrile, no leukocytosis   - Right foot second dorsal proximal interphalangeal joint wound to bone with granular wound borde, edema improved, erythema resolved, , no purulence, no malodor. Left foot no open wound and no signs of acute infection.   -  Right foot wound culture: MSSA (prelim).  - Right foot xray: No OM, no gas  -SHERYL/PVR : RABI  and LABI was not accurately assessed due to non-compressible (calcific) vessels. RTBI 0.83, LTBI 0.72  -recommending vasc consult   - Right foot MRI pending EP pacemaker clearance  - Recommend discontinue Vancomycin and start Unasyn.   - ID consult pending    - Podiatry plan: Local wound care  vs 2nd toe amputation pending MRI, vascular recs, family decision.   - Please document medical clearance for possible podiatric surgical intervention under anesthesia   - Discussed with attending.
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
## 2024-07-29 NOTE — PROGRESS NOTE ADULT - PROBLEM SELECTOR PLAN 3
- c/w Eliquis 2.5 mg BID (unclear why renally dosed), amiodarone 100 mg daily, metoprolol 25 mg ER daily  - corrected QTC 7/29 apx 464  - keep K>4 and Mg>2
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
## 2024-07-29 NOTE — BH CONSULTATION LIAISON PROGRESS NOTE - NSBHFUPINTERVALHXFT_PSY_A_CORE
AOx2 knows he is at [facility], unable to follow simple commands otherwise, very confused, intermittently somnolent

## 2024-07-30 DIAGNOSIS — I77.1 STRICTURE OF ARTERY: ICD-10-CM

## 2024-07-30 PROCEDURE — 99222 1ST HOSP IP/OBS MODERATE 55: CPT | Mod: GC

## 2024-07-30 PROCEDURE — 99232 SBSQ HOSP IP/OBS MODERATE 35: CPT

## 2024-07-30 RX ORDER — RISPERIDONE 1 MG/1
0.5 TABLET, FILM COATED ORAL
Refills: 0 | Status: DISCONTINUED | OUTPATIENT
Start: 2024-07-30 | End: 2024-08-06

## 2024-07-30 RX ORDER — RISPERIDONE 1 MG/1
1 TABLET, FILM COATED ORAL
Refills: 0 | Status: DISCONTINUED | OUTPATIENT
Start: 2024-07-30 | End: 2024-08-02

## 2024-07-30 RX ORDER — DIVALPROEX SODIUM 125 MG/1
250 CAPSULE, COATED PELLETS ORAL EVERY 8 HOURS
Refills: 0 | Status: DISCONTINUED | OUTPATIENT
Start: 2024-07-30 | End: 2024-08-01

## 2024-07-30 RX ADMIN — Medication 3 MILLIGRAM(S): at 21:42

## 2024-07-30 RX ADMIN — DIVALPROEX SODIUM 250 MILLIGRAM(S): 125 CAPSULE, COATED PELLETS ORAL at 21:42

## 2024-07-30 RX ADMIN — ATORVASTATIN CALCIUM 20 MILLIGRAM(S): 40 TABLET, FILM COATED ORAL at 21:42

## 2024-07-30 RX ADMIN — Medication 100 MILLIGRAM(S): at 14:27

## 2024-07-30 RX ADMIN — Medication 1.25 MILLIGRAM(S): at 11:37

## 2024-07-30 RX ADMIN — Medication 100 MILLIGRAM(S): at 05:36

## 2024-07-30 RX ADMIN — Medication 1.25 MILLIGRAM(S): at 12:46

## 2024-07-30 RX ADMIN — APIXABAN 2.5 MILLIGRAM(S): 5 TABLET, FILM COATED ORAL at 18:58

## 2024-07-30 RX ADMIN — Medication 100 MILLIGRAM(S): at 21:41

## 2024-07-30 RX ADMIN — RISPERIDONE 1 MILLIGRAM(S): 1 TABLET, FILM COATED ORAL at 21:41
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
## 2024-07-30 NOTE — PROGRESS NOTE ADULT - ASSESSMENT
89 yo M, hx of A/ fib on Eliquis, HTN, CHF, pacemaker, bladder neoplasm, noted to have RLE 2nd toe wound. Vascular surgery consulted for further recommendations     Recommendations   - SHERYL/PVR reviewed. Will perform possible angiogram pending clearances  - please document medical/cardiac optimization for RLE angiogram, possible angioplasty, possible stent   - continue local wound care  - f/u podiatry recs  - appreciate care per primary team  87 yo M, hx of A/ fib on Eliquis, HTN, CHF, pacemaker, bladder neoplasm, noted to have RLE 2nd toe wound. Vascular surgery consulted for further recommendations     Recommendations   - recommend  pt/ot eval for ambulation before making any   decisions for angiography  family meeting  documentation reviewed also   will hold off if  family does not want to proceed w any  additional intervention  continue woundcare  will follow

## 2024-07-30 NOTE — CONSULT NOTE ADULT - ASSESSMENT
88 year male with history of Afib on Eliquis, HTN, CHF, pacemaker, bladder neoplasm, recently started on Xanax for anxiety last week, dementia AOx0 at baseline, presents for increased agitation. Found to have right foot wound. ID consulted for antibiotic recommendations.     Patient afebrile, hemodynamically stable  No leukocytosis   Right foot X-ray no tracking soft tissue gas collections, radiopaque foreign bodies, or gross radiographic evidence for osteomyelitis  Right foot wound culture growing MSSA  Urine culture citrobacter koseri  MRI pending    Antibiotics:   - Cefazolin 2gm Q8H (7/29-    Assessment  - Right foot wound infection     88 year male with history of Afib on Eliquis, HTN, CHF, pacemaker, bladder neoplasm, recently started on Xanax for anxiety last week, dementia AOx0 at baseline, presents for increased agitation. Found to have right foot wound. ID consulted for antibiotic recommendations.     Patient afebrile, hemodynamically stable  No leukocytosis   Right foot X-ray no tracking soft tissue gas collections, radiopaque foreign bodies, or gross radiographic evidence for osteomyelitis  Right foot wound culture growing MSSA  ESR 15  MRI pending    Antibiotics:   - Cefazolin 2gm Q8H (7/29-    Assessment  - Right foot wound infection     Recommendations  - Continue with Cefazolin   - Continue goals of care discussions of treatment options including vascular work up and surgical interventions vs conservative therapy       Case discussed with attending  Sascha Briscoe DO, PGY-4  Infectious Disease Fellow  [facility]irm[facility] Teams Preferred  After 5pm/weekends call [phone]

## 2024-07-30 NOTE — PROGRESS NOTE ADULT - SUBJECTIVE AND OBJECTIVE BOX
TEAM [ [facility] ] Surgery Daily Progress Note  =====================================================    SUBJECTIVE: Patient seen and examined at bedside on AM rounds.     PMH:   PAST MEDICAL & SURGICAL HISTORY:  Bacterial UTI      Atrial fibrillation      HTN (hypertension)      Dementia          ALLERGIES:  No Known Allergies      --------------------------------------------------------------------------------------    MEDICATIONS:    Neurologic Medications  acetaminophen     Tablet .. 650 milliGRAM(s) Oral every 6 hours PRN Temp greater or equal to 38C (100.4F), Mild Pain (1 - 3)  divalproex Sprinkle 250 milliGRAM(s) Oral every 8 hours  melatonin 3 milliGRAM(s) Oral at bedtime  OLANZapine Injectable 1.25 milliGRAM(s) IntraMuscular every 6 hours PRN aggression  risperiDONE  Disintegrating Tablet 1 milliGRAM(s) Oral <User Schedule>  risperiDONE  Disintegrating Tablet 0.5 milliGRAM(s) Oral <User Schedule>    Respiratory Medications    Cardiovascular Medications  aMIOdarone    Tablet 100 milliGRAM(s) Oral daily  metoprolol succinate ER 25 milliGRAM(s) Oral daily    Gastrointestinal Medications    Genitourinary Medications    Hematologic/Oncologic Medications  apixaban 2.5 milliGRAM(s) Oral two times a day  aspirin  chewable 81 milliGRAM(s) Oral daily    Antimicrobial/Immunologic Medications  ceFAZolin   IVPB 2000 milliGRAM(s) IV Intermittent every 8 hours  ceFAZolin   IVPB        Endocrine/Metabolic Medications  atorvastatin 20 milliGRAM(s) Oral at bedtime    Topical/Other Medications  mupirocin 2% Ointment 1 Application(s) Topical <User Schedule>    --------------------------------------------------------------------------------------    VITAL SIGNS:  Vital Signs Last 24 Hrs  T(C): 36.2 (30 Jul 2024 11:04), Max: 36.7 (30 Jul 2024 05:06)  T(F): 97.1 (30 Jul 2024 11:04), Max: 98 (30 Jul 2024 05:06)  HR: 80 (30 Jul 2024 11:04) (80 - 92)  BP: 128/67 (30 Jul 2024 11:04) (112/66 - 133/83)  BP(mean): --  RR: 18 (30 Jul 2024 11:04) (18 - 18)  SpO2: 97% (30 Jul 2024 11:04) (97% - 98%)    Parameters below as of 30 Jul 2024 11:04  Patient On (Oxygen Delivery Method): room air      --------------------------------------------------------------------------------------    PHYSICAL EXAM:  General: No acute distress  Respiratory: Nonlabored  Cardiovascular: RRR  Extremities: RLE 2nd digit wound dry, no purulence, b/l femoral, pop pulses, RLE DP signal PT pulse, LLE DP signal PT signal    --------------------------------------------------------------------------------------    LABS                        13.3   7.05  )-----------( 238      ( 29 Jul 2024 04:25 )             39.5   07-29    138  |  104  |  16  ----------------------------<  91  3.8   |  24  |  1.26    Ca    9.1      29 Jul 2024 04:25  Mg     2.00     07-29    TPro  6.4  /  Alb  3.4  /  TBili  0.6  /  DBili  x   /  AST  30  /  ALT  27  /  AlkPhos  78  07-29      -------------------------------------------------------------------------------------- TEAM [ [facility] ] Surgery Daily Progress Note  =====================================================    SUBJECTIVE: Patient seen and examined at bedside on AM rounds.     PMH:   PAST MEDICAL & SURGICAL HISTORY:  Bacterial UTI      Atrial fibrillation      HTN (hypertension)      Dementia          ALLERGIES:  No Known Allergies      --------------------------------------------------------------------------------------    MEDICATIONS:    Neurologic Medications  acetaminophen     Tablet .. 650 milliGRAM(s) Oral every 6 hours PRN Temp greater or equal to 38C (100.4F), Mild Pain (1 - 3)  divalproex Sprinkle 250 milliGRAM(s) Oral every 8 hours  melatonin 3 milliGRAM(s) Oral at bedtime  OLANZapine Injectable 1.25 milliGRAM(s) IntraMuscular every 6 hours PRN aggression  risperiDONE  Disintegrating Tablet 1 milliGRAM(s) Oral <User Schedule>  risperiDONE  Disintegrating Tablet 0.5 milliGRAM(s) Oral <User Schedule>    Respiratory Medications    Cardiovascular Medications  aMIOdarone    Tablet 100 milliGRAM(s) Oral daily  metoprolol succinate ER 25 milliGRAM(s) Oral daily    Gastrointestinal Medications    Genitourinary Medications    Hematologic/Oncologic Medications  apixaban 2.5 milliGRAM(s) Oral two times a day  aspirin  chewable 81 milliGRAM(s) Oral daily    Antimicrobial/Immunologic Medications  ceFAZolin   IVPB 2000 milliGRAM(s) IV Intermittent every 8 hours  ceFAZolin   IVPB        Endocrine/Metabolic Medications  atorvastatin 20 milliGRAM(s) Oral at bedtime    Topical/Other Medications  mupirocin 2% Ointment 1 Application(s) Topical <User Schedule>    --------------------------------------------------------------------------------------    VITAL SIGNS:  Vital Signs Last 24 Hrs  T(C): 36.2 (30 Jul 2024 11:04), Max: 36.7 (30 Jul 2024 05:06)  T(F): 97.1 (30 Jul 2024 11:04), Max: 98 (30 Jul 2024 05:06)  HR: 80 (30 Jul 2024 11:04) (80 - 92)  BP: 128/67 (30 Jul 2024 11:04) (112/66 - 133/83)  BP(mean): --  RR: 18 (30 Jul 2024 11:04) (18 - 18)  SpO2: 97% (30 Jul 2024 11:04) (97% - 98%)    Parameters below as of 30 Jul 2024 11:04  Patient On (Oxygen Delivery Method): room air      --------------------------------------------------------------------------------------    PHYSICAL EXAM:  General: No acute distress  Respiratory: Nonlabored  Cardiovascular: RRR  Extremities: RLE 2nd digit wound dry, no purulence, b/l femoral, pop pulses, RLE DP signal PT pulse, LLE DP signal PT signal    --------------------------------------------------------------------------------------    LABS                        13.3   7.05  )-----------( 238      ( 29 Jul 2024 04:25 )             39.5   07-29    138  |  104  |  16  ----------------------------<  91  3.8   |  24  |  1.26    Ca    9.1      29 Jul 2024 04:25  Mg     2.00     07-29    TPro  6.4  /  Alb  3.4  /  TBili  0.6  /  DBili  x   /  AST  30  /  ALT  27  /  AlkPhos  78  07-29    francisco/pvr reviewed   --------------------------------------------------------------------------------------

## 2024-07-30 NOTE — PROGRESS NOTE ADULT - SUBJECTIVE AND OBJECTIVE BOX
Patient is a 88y old  Male who presents with a chief complaint of agitation (29 Jul 2024 20:41)    SUBJECTIVE / OVERNIGHT EVENTS:    can state his name, denies complaints  agitated during care and spit out meds     MEDICATIONS  (STANDING):  aMIOdarone    Tablet 100 milliGRAM(s) Oral daily  apixaban 2.5 milliGRAM(s) Oral two times a day  aspirin  chewable 81 milliGRAM(s) Oral daily  atorvastatin 20 milliGRAM(s) Oral at bedtime  ceFAZolin   IVPB 2000 milliGRAM(s) IV Intermittent every 8 hours  divalproex Sprinkle 250 milliGRAM(s) Oral every 8 hours  melatonin 3 milliGRAM(s) Oral at bedtime  metoprolol succinate ER 25 milliGRAM(s) Oral daily  mupirocin 2% Ointment 1 Application(s) Topical <User Schedule>  risperiDONE   Tablet 0.75 milliGRAM(s) Oral <User Schedule>  risperiDONE  Disintegrating Tablet 0.5 milliGRAM(s) Oral <User Schedule>  risperiDONE  Disintegrating Tablet 1 milliGRAM(s) Oral <User Schedule>    MEDICATIONS  (PRN):  acetaminophen     Tablet .. 650 milliGRAM(s) Oral every 6 hours PRN Temp greater or equal to 38C (100.4F), Mild Pain (1 - 3)  OLANZapine Injectable 1.25 milliGRAM(s) IntraMuscular every 6 hours PRN aggression  OLANZapine Injectable 1.25 milliGRAM(s) IntraMuscular once PRN pre-MRI    T(C): 36.2 (07-30-24 @ 11:04), Max: 36.7 (07-30-24 @ 05:06)  HR: 80 (07-30-24 @ 11:04) (80 - 92)  BP: 128/67 (07-30-24 @ 11:04) (112/66 - 133/83)  RR: 18 (07-30-24 @ 11:04) (18 - 18)  SpO2: 97% (07-30-24 @ 11:04) (97% - 98%)    PHYSICAL EXAM:  GENERAL: NAD   CHEST/LUNG: Clear to auscultation bilaterally; No wheeze  HEART: Regular rate and rhythm; No murmurs, rubs, or gallops  ABDOMEN: Soft, Nontender, Nondistended; Bowel sounds present  EXTREMITIES:   warm and well perfused, No clubbing, cyanosis, or edema  PSYCH: AAOx1  NEUROLOGY: non-focal  SKIN: R foot dressing c/d/i, scabbing on shins     LABS:                        13.3   7.05  )-----------( 238      ( 29 Jul 2024 04:25 )             39.5     07-29    138  |  104  |  16  ----------------------------<  91  3.8   |  24  |  1.26    Ca    9.1      29 Jul 2024 04:25  Mg     2.00     07-29    TPro  6.4  /  Alb  3.4  /  TBili  0.6  /  DBili  x   /  AST  30  /  ALT  27  /  AlkPhos  78  07-29    Care Discussed with Consultants/Other Providers:  IDR team, Dr. Ramirez

## 2024-07-30 NOTE — BH CONSULTATION LIAISON PROGRESS NOTE - NSBHFUPINTERVALHXFT_PSY_A_CORE
patient calm, AOx1 at most confused, waxing/waning, attention poor, recall poor, unable to follow simple commands but does not recoil when arms are moved, no current concern for catatonia, EPS, rigidity, tremors

## 2024-07-30 NOTE — CHART NOTE - NSCHARTNOTEFT_GEN_A_CORE
Discussed with wife that after our discussion/consent yesterday and despite pre-med with Zyprexa Mr. Chnag was unable to tolerate MRI of the R foot due to agitation/lack cooperation and was returned to his room.      Advised that podiatry is concerned for OM however without more invasive testing this cannot be confirmed or excluded  (OR bx per podiatry).  Additionally any procedure to the foot would require assessment of the circulatory supply to area with other invasive testing via angiogram (she mentions he has solitary kidney).  Advised given his debility, current dementia compounded by delirium with poor cooperation with care, as well as limited mobility at present that these test may lead to further harms including worsening of his present mental status/increased delirium, infections, poor wound healing, APOLINAR etc.  At this time she feels the toe appearance and wound have much improved from how it was previously and is in agreement to forego further invasive testing unless mental status were to improve.    We will continue with antibiotics and ongoing management of delirium and behaviors.

## 2024-07-30 NOTE — CONSULT NOTE ADULT - ATTENDING COMMENTS
88 year old patient with dementia who presented with increased agitation    He was noted to have a wound to his right second toe.   Cultures grew MSSA. Currently on cefazolin   His sed rate is 15.    His toe ulcer/ wound does not appear acutely infected.  If there is underlying OM, treatment would be optimizing blood supply and source control    I suspect there is underlying PVD that are leaving him at risk for poor wound healing.   But I think he is high risk for aggressive interventions.   In an elderly patient with cognitive impairment and a poor functional status, I would encourage a discussion with the patients family regarding goals of care prior to initiating an aggressive work up.   He is a poor candidate for aggressive interventions.      If he is not a candidate for aggressive intervention, a conservative approach would be to give Kelfex 500 mg po q 6 for a few weeks.
I Vlad Wilson MD have participated in the daily care of this patient and discussed  the findings and plan with the house officer. I reviewed the resident note and agree with the findings and plan

## 2024-07-30 NOTE — PROGRESS NOTE ADULT - PROBLEM SELECTOR PLAN 1
JOSÉ LUIS Wilson MD have participated in the daily care of this patient  and have seen and examined the patient today and agree with  the  evaluation, assessment and plan of the surgical house officer  JOSÉ LUIS Wilson MD have personally seen and examined the patient at bedside today at  10  pm

## 2024-07-30 NOTE — PROGRESS NOTE ADULT - PROBLEM SELECTOR PLAN 9
Eliquis  DNR/DNI  from [facility], needs new facility as per CM  dc pending MRI and further eval for R foot wound Eliquis  DNR/DNI  from LTC/NH, needs new facility as per CM  dc pending MRI and further eval for R foot wound  care d/w wife on 7/29, attempted to reach on 7/30 goes to VM

## 2024-07-30 NOTE — PROGRESS NOTE ADULT - PROBLEM SELECTOR PLAN 1
R foot wound  - ESR 15, CRP 12  - podiatry following  - SHERYL/PVR 7/27:  1. Right: Right SHERYL was not accurately assessed due to non-compressible (calcific) vessels. Although right digit waveforms appear moderately diminished in amplitude, the TBI is normal (0.83) with a toe pressure of 124 mmHg. 2. Left: Left SHERYL was not accurately assessed due to non-compressible (calcific) vessels. Although left digit waveforms appear moderately diminished in amplitude, the TBI is normal (0.72) with a toe pressure of 108 mmHg.  - MR R foot pending, PPM MRI conditional   - vascular consulted per podiatry--possible angiogram, would be medically optimized   - wound culture 7/24: MSSA  - c/w abx Unasyn (7/26-7/27)-->vancomycin (7/27-7/28)-->cefazolin (7/29-

## 2024-07-30 NOTE — PROGRESS NOTE ADULT - PROBLEM SELECTOR PLAN 2
Acute encephalopathy superimposed on chronic dementia,   - CTH: Anterior communicating artery aneurysm coil embolization. No acute intracranial hemorrhage. 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 Advanced chronic microvascular ischemic changes.  - psych following, risperidone 0.5/1 mg + Depakote 250 mg TID (change to sprinkles)  - off restraints since 7/29

## 2024-07-30 NOTE — CHART NOTE - NSCHARTNOTEFT_GEN_A_CORE
- Pt unable to tolerate MR today. Medicine now asking for bedside bone biopsy. Given patient history, would be recommended to take patient into OR and be sedated for a Right foot 2nd digit arthroplasty that can serve as a bone biopsy for proper antibiotic management.  - Please document medical and cardiac clearance for possible podiatric surgical intervention under anesthesia  - Pacemaker will require interrogation prior to surgical intervention; Recommend EP consult  - Pod plan: local wound care vs right foot 2nd digit arthroplasty with bone biopsy pending booking/ medical clearance/ EP consult.  - Discussed with attending - Pt unable to tolerate MR today. Medicine recommending bedside bone biopsy. Given patient history, would be recommended to take patient into OR and be sedated for a Right foot 2nd digit arthroplasty that can serve as a bone biopsy for proper antibiotic management.  - Please document medical and cardiac clearance for possible podiatric surgical intervention under anesthesia  - Pacemaker will require interrogation prior to surgical intervention; Recommend EP consult  - Pod plan: local wound care vs right foot 2nd digit arthroplasty with bone biopsy pending booking/ medical clearance/ EP consult/discussion with family GOC.  - Discussed with attending.

## 2024-07-30 NOTE — PROGRESS NOTE ADULT - ASSESSMENT
EKG V Paced QTc 562     Assessment and Plan     1) QT prolongation : 2/2 Paced rhythm corrected QTc on 7/29 around 450ms , ok to continue Antipsychotics repeat EKG in 2 days     2) Afib : cw metoprolol and eliquis and amiodarone    3) DVT PPX eliquis  EKG V Paced QTc 562     Assessment and Plan     1) QT prolongation : 2/2 Paced rhythm corrected QTc on 7/29 around 450ms , ok to continue Antipsychotics repeat EKG tomorrow    2) Afib : cw metoprolol and eliquis and amiodarone    3) DVT PPX eliquis

## 2024-07-30 NOTE — CONSULT NOTE ADULT - SUBJECTIVE AND OBJECTIVE BOX
INFECTIOUS DISEASE CONSULT NOTE    Patient is a 88y old  Male who presents with a chief complaint of agitation (29 Jul 2024 20:41)    HPI:  Pt is an 88 year male with history of Afib on Eliquis, HTN, CHF, pacemaker, bladder neoplasm, recently started on Xanax for anxiety last week, dementia AOx0 at baseline, presents for increased agitation. Unable to obtain hx from pt given dementia and also sedated from medications administered in ED. Per ED provider note, pt with increased agitation to residents and staff at rehab facility and we sent to ED. Pt had received IV versed after punching EMS. In ED, pt was given IV haldol 2.5 mg x2 and IV ativan 2 mg x1.     ED management: IV haldol 2.5 mg x2, ativan 2 mg x1 (22 Jul 2024 23:17)       REVIEW OF SYSTEMS:  Unable to obtain due to patients mental status    Prior hospital charts reviewed [Yes]  Primary team notes reviewed [Yes]  Other consultant notes reviewed [Yes]    PAST MEDICAL & SURGICAL HISTORY:  Bacterial UTI  Atrial fibrillation  HTN (hypertension)  Dementia          SOCIAL HISTORY:      FAMILY HISTORY:      Allergies:  No Known Allergies      ANTIMICROBIALS:  ceFAZolin   IVPB 2000 every 8 hours  ceFAZolin   IVPB        ANTIMICROBIALS (past 90 days):  MEDICATIONS  (STANDING):    ampicillin/sulbactam  IVPB   200 mL/Hr IV Intermittent (07-26-24 @ 17:16)    ampicillin/sulbactam  IVPB   200 mL/Hr IV Intermittent (07-27-24 @ 11:21)   200 mL/Hr IV Intermittent (07-27-24 @ 05:20)   200 mL/Hr IV Intermittent (07-27-24 @ 00:06)    ceFAZolin   IVPB   100 mL/Hr IV Intermittent (07-29-24 @ 14:53)    ceFAZolin   IVPB   100 mL/Hr IV Intermittent (07-30-24 @ 14:27)   100 mL/Hr IV Intermittent (07-30-24 @ 05:36)   100 mL/Hr IV Intermittent (07-29-24 @ 21:21)    vancomycin  IVPB   125 mL/Hr IV Intermittent (07-28-24 @ 18:13)    vancomycin  IVPB   125 mL/Hr IV Intermittent (07-27-24 @ 18:07)        OTHER MEDS:   MEDICATIONS  (STANDING):  acetaminophen     Tablet .. 650 every 6 hours PRN  aMIOdarone    Tablet 100 daily  apixaban 2.5 two times a day  aspirin  chewable 81 daily  atorvastatin 20 at bedtime  divalproex Sprinkle 250 every 8 hours  melatonin 3 at bedtime  metoprolol succinate ER 25 daily  OLANZapine Injectable 1.25 every 6 hours PRN  risperiDONE  Disintegrating Tablet 1 <User Schedule>  risperiDONE  Disintegrating Tablet 0.5 <User Schedule>      VITALS:  Vital Signs Last 24 Hrs  T(F): 97.1 (07-30-24 @ 11:04), Max: 98.5 (07-29-24 @ 05:05)    Vital Signs Last 24 Hrs  HR: 80 (07-30-24 @ 11:04) (80 - 92)  BP: 128/67 (07-30-24 @ 11:04) (112/66 - 133/83)  RR: 18 (07-30-24 @ 11:04)  SpO2: 97% (07-30-24 @ 11:04) (97% - 98%)  Wt(kg): --    EXAM:      Labs:                        13.3   7.05  )-----------( 238      ( 29 Jul 2024 04:25 )             39.5     07-29    138  |  104  |  16  ----------------------------<  91  3.8   |  24  |  1.26    Ca    9.1      29 Jul 2024 04:25  Mg     2.00     07-29    TPro  6.4  /  Alb  3.4  /  TBili  0.6  /  DBili  x   /  AST  30  /  ALT  27  /  AlkPhos  78  07-29      WBC Trend:  WBC Count: 7.05 (07-29-24 @ 04:25)  WBC Count: 8.47 (07-28-24 @ 06:19)  WBC Count: 8.96 (07-27-24 @ 06:20)      Auto Neutrophil #: 6.17 K/uL (07-27-24 @ 06:20)  Auto Neutrophil #: 4.50 K/uL (07-24-24 @ 05:50)  Auto Neutrophil #: 4.64 K/uL (07-22-24 @ 14:37)  Auto Neutrophil #: 6.34 K/uL (01-12-24 @ 14:15)      Creatine Trend:  Creatinine: 1.26 (07-29)  Creatinine: 1.22 (07-28)  Creatinine: 1.22 (07-27)  Creatinine: 1.23 (07-27)      Liver Biochemical Testing Trend:  Alanine Aminotransferase (ALT/SGPT): 27 (07-29)  Alanine Aminotransferase (ALT/SGPT): 20 (07-27)  Alanine Aminotransferase (ALT/SGPT): 7 (07-22)  Alanine Aminotransferase (ALT/SGPT): 19 (01-12)  Aspartate Aminotransferase (AST/SGOT): 30 (07-29-24 @ 04:25)  Aspartate Aminotransferase (AST/SGOT): 27 (07-27-24 @ 06:20)  Aspartate Aminotransferase (AST/SGOT): 15 (07-22-24 @ 14:37)  Aspartate Aminotransferase (AST/SGOT): 18 (01-12-24 @ 14:15)  Bilirubin Total: 0.6 (07-29)  Bilirubin Total: 0.7 (07-27)  Bilirubin Total: 0.4 (07-22)  Bilirubin Total: 1.8 (01-12)      Trend LDH          Urinalysis Basic - ( 29 Jul 2024 04:25 )    Color: x / Appearance: x / SG: x / pH: x  Gluc: 91 mg/dL / Ketone: x  / Bili: x / Urobili: x   Blood: x / Protein: x / Nitrite: x   Leuk Esterase: x / RBC: x / WBC x   Sq Epi: x / Non Sq Epi: x / Bacteria: x        MICROBIOLOGY:        Culture - Abscess with Gram Stain (collected 24 Jul 2024 17:30)  Source: .Abscess RIght 2nd digit  Preliminary Report:    Few Staphylococcus aureus  Organism: Staphylococcus aureus  Organism: Staphylococcus aureus    Sensitivities:      -  Clindamycin: S <=0.25      -  Oxacillin: S <=0.25 Oxacillin predicts susceptibility for dicloxacillin, methicillin, and nafcillin      -  Gentamicin: S <=1 Should not be used as monotherapy      -  Vancomycin: S 1      -  Tetracycline: S <=1      Method Type: ANNEMARIE      -  Penicillin: R 8      -  Rifampin: S <=1 Should not be used as monotherapy      -  Erythromycin: R >4      -  Trimethoprim/Sulfamethoxazole: S <=0.5/9.5    Urinalysis with Rflx Culture (collected 22 Jul 2024 22:06)    Culture - Urine (collected 12 Jan 2024 15:30)  Source: Catheterized Catheterized  Final Report:    >100,000 CFU/ml Citrobacter koseri  Organism: Citrobacter koseri  Organism: Citrobacter koseri    Sensitivities:      -  Levofloxacin: S <=0.5      -  Tobramycin: S <=2      -  Nitrofurantoin: S <=32 Should not be used to treat pyelonephritis      -  Aztreonam: S <=4      -  Gentamicin: S <=2      -  Cefazolin: S <=2      -  Cefepime: S <=2      -  Piperacillin/Tazobactam: S <=8      -  Ciprofloxacin: S <=0.25      -  Imipenem: S <=1      -  Ceftriaxone: S <=1      -  Ampicillin: R >16 These ampicillin results predict results for amoxicillin      Method Type: ANNEMARIE      -  Meropenem: S <=1      -  Ampicillin/Sulbactam: S <=4/2      -  Cefoxitin: S <=8      -  Amoxicillin/Clavulanic Acid: S <=8/4      -  Trimethoprim/Sulfamethoxazole: S <=0.5/9.5      -  Ertapenem: S <=0.5                  C-Reactive Protein: 12.4 (07-24)                A1C with Estimated Average Glucose Result: 5.2 % (07-29-24 @ 04:25)      RADIOLOGY:  imaging below personally reviewed  < from: Xray Foot AP + Lateral + Oblique, Right (07.25.24 @ 09:56) >    IMPRESSION:  No tracking soft tissue gas collections, radiopaque foreign bodies, or   gross radiographic evidence for osteomyelitis. However if concern remains   MRI suggested to further assess.    No fractures or dislocations.    Tarsometatarsal alignment maintained without evidence for a Lisfranc   injury.    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 Preserved alignment remaining articulations and no joint   margin erosions.    Tiny calcaneal enthesophytes.    Dense hypertrophic toenails shadows, correlate for onychomycosis.    Scattered vascular calcifications.      < end of copied text >

## 2024-07-30 NOTE — PROGRESS NOTE ADULT - SUBJECTIVE AND OBJECTIVE BOX
Percy Teran MD  Interventional Cardiology / Advance Heart Failure and Cardiac Transplant Specialist  [place] [facility] : [address] [place] [address] [facility] [address]  Tel: [phone]  [place] [facility] : [address] [facility] [place] [address]  Tel: [phone]         Pt is lying in bed comfortable not in distress, no chest pains no SOB no palpitations  	  MEDICATIONS:  aMIOdarone    Tablet 100 milliGRAM(s) Oral daily  apixaban 2.5 milliGRAM(s) Oral two times a day  aspirin  chewable 81 milliGRAM(s) Oral daily  metoprolol succinate ER 25 milliGRAM(s) Oral daily    ceFAZolin   IVPB      ceFAZolin   IVPB 2000 milliGRAM(s) IV Intermittent every 8 hours      acetaminophen     Tablet .. 650 milliGRAM(s) Oral every 6 hours PRN  divalproex Sprinkle 250 milliGRAM(s) Oral every 8 hours  melatonin 3 milliGRAM(s) Oral at bedtime  OLANZapine Injectable 1.25 milliGRAM(s) IntraMuscular every 6 hours PRN  risperiDONE  Disintegrating Tablet 1 milliGRAM(s) Oral <User Schedule>  risperiDONE  Disintegrating Tablet 0.5 milliGRAM(s) Oral <User Schedule>      atorvastatin 20 milliGRAM(s) Oral at bedtime    mupirocin 2% Ointment 1 Application(s) Topical <User Schedule>      PAST MEDICAL/SURGICAL HISTORY  PAST MEDICAL & SURGICAL HISTORY:  Bacterial UTI      Atrial fibrillation      HTN (hypertension)      Dementia          SOCIAL HISTORY: Substance Use (street drugs): ( x ) never used  (  ) other:    FAMILY HISTORY:        PHYSICAL EXAM:  T(C): 36.2 (07-30-24 @ 11:04), Max: 36.7 (07-30-24 @ 05:06)  HR: 80 (07-30-24 @ 11:04) (80 - 92)  BP: 128/67 (07-30-24 @ 11:04) (112/66 - 133/83)  RR: 18 (07-30-24 @ 11:04) (18 - 18)  SpO2: 97% (07-30-24 @ 11:04) (97% - 98%)  Wt(kg): --  I&O's Summary        GENERAL: NAD  EYES:   PERRLA   ENMT:   Moist mucous membranes, Good dentition, No lesions  Cardiovascular: Normal S1 S2, No JVD, No murmurs, No edema  Respiratory: Lungs clear to auscultation	  Gastrointestinal:  Soft, Non-tender, + BS	  Extremities: b/l venous stasis ulcers                                13.3   7.05  )-----------( 238      ( 29 Jul 2024 04:25 )             39.5     07-29    138  |  104  |  16  ----------------------------<  91  3.8   |  24  |  1.26    Ca    9.1      29 Jul 2024 04:25  Mg     2.00     07-29    TPro  6.4  /  Alb  3.4  /  TBili  0.6  /  DBili  x   /  AST  30  /  ALT  27  /  AlkPhos  78  07-29    proBNP:   Lipid Profile:   HgA1c:   TSH:     Consultant(s) Notes Reviewed:  [x ] YES  [ ] NO    Care Discussed with Consultants/Other Providers [ x] YES  [ ] NO    Imaging Personally Reviewed independently:  [x] YES  [ ] NO    All labs, radiologic studies, vitals, orders and medications list reviewed. Patient is seen and examined at bedside. Case discussed with medical team.

## 2024-07-31 PROCEDURE — 99232 SBSQ HOSP IP/OBS MODERATE 35: CPT

## 2024-07-31 RX ADMIN — DIVALPROEX SODIUM 250 MILLIGRAM(S): 125 CAPSULE, COATED PELLETS ORAL at 21:56

## 2024-07-31 RX ADMIN — Medication 100 MILLIGRAM(S): at 06:19

## 2024-07-31 RX ADMIN — AMIODARONE HYDROCHLORIDE 100 MILLIGRAM(S): 50 INJECTION, SOLUTION INTRAVENOUS at 07:04

## 2024-07-31 RX ADMIN — Medication 100 MILLIGRAM(S): at 13:48

## 2024-07-31 RX ADMIN — RISPERIDONE 0.5 MILLIGRAM(S): 1 TABLET, FILM COATED ORAL at 09:56

## 2024-07-31 RX ADMIN — Medication 3 MILLIGRAM(S): at 21:56

## 2024-07-31 RX ADMIN — APIXABAN 2.5 MILLIGRAM(S): 5 TABLET, FILM COATED ORAL at 06:18

## 2024-07-31 RX ADMIN — Medication 100 MILLIGRAM(S): at 21:57

## 2024-07-31 RX ADMIN — MUPIROCIN CALCIUM 1 APPLICATION(S): 20 CREAM TOPICAL at 08:37

## 2024-07-31 RX ADMIN — APIXABAN 2.5 MILLIGRAM(S): 5 TABLET, FILM COATED ORAL at 17:16

## 2024-07-31 RX ADMIN — Medication 25 MILLIGRAM(S): at 06:18

## 2024-07-31 RX ADMIN — DIVALPROEX SODIUM 250 MILLIGRAM(S): 125 CAPSULE, COATED PELLETS ORAL at 13:48

## 2024-07-31 RX ADMIN — Medication 81 MILLIGRAM(S): at 13:48

## 2024-07-31 RX ADMIN — ATORVASTATIN CALCIUM 20 MILLIGRAM(S): 40 TABLET, FILM COATED ORAL at 21:56

## 2024-07-31 RX ADMIN — DIVALPROEX SODIUM 250 MILLIGRAM(S): 125 CAPSULE, COATED PELLETS ORAL at 06:18

## 2024-07-31 RX ADMIN — RISPERIDONE 1 MILLIGRAM(S): 1 TABLET, FILM COATED ORAL at 21:56

## 2024-07-31 NOTE — PROGRESS NOTE ADULT - PROBLEM SELECTOR PLAN 1
JOSÉ LUIS Wilson MD have participated in the daily care of this patient  and have seen and examined the patient today and agree with  the  evaluation, assessment and plan of the surgical house officer  JOSÉ LUIS Wilson MD have personally seen and examined the patient at bedside today at  5  pm

## 2024-07-31 NOTE — PROGRESS NOTE ADULT - ATTENDING COMMENTS
JOSÉ LUIS Wilson MD have participated in the daily care of this patient  and have seen and examined the patient today and agree with  the  evaluation, assessment and plan of the surgical house officer  JOSÉ LUIS Wilson MD have personally seen and examined the patient at bedside today at  5  pm
JOSÉ LUIS Wilson MD have participated in the daily care of this patient  and have seen and examined the patient today and agree with  the  evaluation, assessment and plan of the surgical house officer  JOSÉ LUIS Wilson MD have personally seen and examined the patient at bedside today at  10  pm

## 2024-07-31 NOTE — PROGRESS NOTE ADULT - ASSESSMENT
EKG V Paced QTc 562     Assessment and Plan     1) QT prolongation : 2/2 Paced rhythm corrected QTc around 490ms , ok to continue Antipsychotics, repeat EKG      2) Afib :cw metoprolol and eliquis     3) DVT PPX eliquis  EKG V Paced QTc 562     Assessment and Plan     1) QT prolongation : 2/2 Paced rhythm corrected QTc around 450ms , ok to continue Antipsychotics, repeat EKG      2) Afib :cw metoprolol and eliquis     3) DVT PPX eliquis
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
## 2024-07-31 NOTE — PROGRESS NOTE ADULT - SUBJECTIVE AND OBJECTIVE BOX
Patient is a 88y old  Male who presents with a chief complaint of agitation (31 Jul 2024 09:14)    SUBJECTIVE / OVERNIGHT EVENTS:    ROS limited due to dementia   per RN calm this am, no issues, did not take breakfast yet    MEDICATIONS  (STANDING):  aMIOdarone    Tablet 100 milliGRAM(s) Oral daily  apixaban 2.5 milliGRAM(s) Oral two times a day  aspirin  chewable 81 milliGRAM(s) Oral daily  atorvastatin 20 milliGRAM(s) Oral at bedtime  ceFAZolin   IVPB 2000 milliGRAM(s) IV Intermittent every 8 hours  divalproex Sprinkle 250 milliGRAM(s) Oral every 8 hours  melatonin 3 milliGRAM(s) Oral at bedtime  metoprolol succinate ER 25 milliGRAM(s) Oral daily  mupirocin 2% Ointment 1 Application(s) Topical <User Schedule>  risperiDONE  Disintegrating Tablet 0.5 milliGRAM(s) Oral <User Schedule>  risperiDONE  Disintegrating Tablet 1 milliGRAM(s) Oral <User Schedule>    MEDICATIONS  (PRN):  acetaminophen     Tablet .. 650 milliGRAM(s) Oral every 6 hours PRN Temp greater or equal to 38C (100.4F), Mild Pain (1 - 3)  OLANZapine Injectable 1.25 milliGRAM(s) IntraMuscular every 6 hours PRN aggression    T(C): 36.7 (07-31-24 @ 05:13), Max: 36.7 (07-31-24 @ 05:13)  HR: 82 (07-31-24 @ 05:13) (80 - 85)  BP: 134/71 (07-31-24 @ 05:13) (118/62 - 134/71)  RR: 18 (07-31-24 @ 05:13) (17 - 18)  SpO2: 99% (07-31-24 @ 05:13) (97% - 99%)    PHYSICAL EXAM:  GENERAL: NAD   CHEST/LUNG: Clear to auscultation bilaterally; No wheeze  HEART: Regular rate and rhythm; No murmurs, rubs, or gallops  ABDOMEN: Soft, Nontender, Nondistended; Bowel sounds present  EXTREMITIES:   warm and well perfused, No clubbing, cyanosis, or edema  PSYCH: AAOx1  NEUROLOGY: non-focal  SKIN: R foot dressing c/d/i, scabbing on shins     LABS:    no new labs     Care Discussed with Consultants/Other Providers:  ID, podiatry, psych, IDR team     Patient is a 88y old  Male who presents with a chief complaint of agitation (31 Jul 2024 09:14)    SUBJECTIVE / OVERNIGHT EVENTS:    denies complaints nodding yes/no only, ROS cannot be obtained, sleepy  ROS limited due to dementia   per RN calm this am, no issues, did not take breakfast yet    MEDICATIONS  (STANDING):  aMIOdarone    Tablet 100 milliGRAM(s) Oral daily  apixaban 2.5 milliGRAM(s) Oral two times a day  aspirin  chewable 81 milliGRAM(s) Oral daily  atorvastatin 20 milliGRAM(s) Oral at bedtime  ceFAZolin   IVPB 2000 milliGRAM(s) IV Intermittent every 8 hours  divalproex Sprinkle 250 milliGRAM(s) Oral every 8 hours  melatonin 3 milliGRAM(s) Oral at bedtime  metoprolol succinate ER 25 milliGRAM(s) Oral daily  mupirocin 2% Ointment 1 Application(s) Topical <User Schedule>  risperiDONE  Disintegrating Tablet 0.5 milliGRAM(s) Oral <User Schedule>  risperiDONE  Disintegrating Tablet 1 milliGRAM(s) Oral <User Schedule>    MEDICATIONS  (PRN):  acetaminophen     Tablet .. 650 milliGRAM(s) Oral every 6 hours PRN Temp greater or equal to 38C (100.4F), Mild Pain (1 - 3)  OLANZapine Injectable 1.25 milliGRAM(s) IntraMuscular every 6 hours PRN aggression    T(C): 36.7 (07-31-24 @ 05:13), Max: 36.7 (07-31-24 @ 05:13)  HR: 82 (07-31-24 @ 05:13) (80 - 85)  BP: 134/71 (07-31-24 @ 05:13) (118/62 - 134/71)  RR: 18 (07-31-24 @ 05:13) (17 - 18)  SpO2: 99% (07-31-24 @ 05:13) (97% - 99%)    PHYSICAL EXAM:  GENERAL: NAD   CHEST/LUNG: Clear to auscultation bilaterally; No wheeze  HEART: Regular rate and rhythm; No murmurs, rubs, or gallops  ABDOMEN: Soft, Nontender, Nondistended; Bowel sounds present  EXTREMITIES:   warm and well perfused, No clubbing, cyanosis, or edema  PSYCH: sleepy  NEUROLOGY: non-focal  SKIN: R foot dressing c/d/i, scabbing on shins     LABS:    no new labs     Care Discussed with Consultants/Other Providers:  ID, podiatry, psych, IDR team

## 2024-07-31 NOTE — PROGRESS NOTE ADULT - SUBJECTIVE AND OBJECTIVE BOX
Follow Up:      Inverval History/ROS:Patient is a 88y old  Male who presents with a chief complaint of agitation (31 Jul 2024 12:32)    No fever  No events      Allergies    No Known Allergies    Intolerances        ANTIMICROBIALS:  ceFAZolin   IVPB    ceFAZolin   IVPB 2000 every 8 hours      OTHER MEDS:  acetaminophen     Tablet .. 650 milliGRAM(s) Oral every 6 hours PRN  aMIOdarone    Tablet 100 milliGRAM(s) Oral daily  apixaban 2.5 milliGRAM(s) Oral two times a day  aspirin  chewable 81 milliGRAM(s) Oral daily  atorvastatin 20 milliGRAM(s) Oral at bedtime  divalproex Sprinkle 250 milliGRAM(s) Oral every 8 hours  melatonin 3 milliGRAM(s) Oral at bedtime  metoprolol succinate ER 25 milliGRAM(s) Oral daily  mupirocin 2% Ointment 1 Application(s) Topical <User Schedule>  OLANZapine Injectable 1.25 milliGRAM(s) IntraMuscular every 6 hours PRN  risperiDONE  Disintegrating Tablet 0.5 milliGRAM(s) Oral <User Schedule>  risperiDONE  Disintegrating Tablet 1 milliGRAM(s) Oral <User Schedule>      Vital Signs Last 24 Hrs  T(C): 36.2 (31 Jul 2024 11:08), Max: 36.7 (31 Jul 2024 05:13)  T(F): 97.1 (31 Jul 2024 11:08), Max: 98 (31 Jul 2024 05:13)  HR: 81 (31 Jul 2024 11:08) (81 - 85)  BP: 125/79 (31 Jul 2024 11:08) (118/62 - 134/71)  BP(mean): --  RR: 18 (31 Jul 2024 11:08) (17 - 18)  SpO2: 97% (31 Jul 2024 11:08) (97% - 99%)    Parameters below as of 31 Jul 2024 11:08  Patient On (Oxygen Delivery Method): room air        PHYSICAL EXAM:  General: [ ] non-toxic  HEAD/EYES: [ ] PERRL [x ] white sclera [ ] icterus  ENT:  [ ] normal x[ ] supple [ ] thrush [ ] pharyngeal exudate  Cardiovascular:   [ ] murmur [ ] normal [ ] PPM/AICD  Respiratory:  [x ] clear to ausculation bilaterally  GI:  [ x] soft, non-tender, normal bowel sounds  :  [ ] alvarez [ ] no CVA tenderness   Musculoskeletal:  [ ] no synovitis  Neurologic:  [ ] non-focal exam   Skin:  x[ ] toe with ulcerated wound on dorsal aspect, minimal redness  Lymph: [x ] no lymphadenopathy  Psychiatric:  [ ] appropriate affect [ ] alert & oriented  Lines:  [x ] no phlebitis [ ] central line                          MICROBIOLOGY:    RADIOLOGY:

## 2024-07-31 NOTE — PROGRESS NOTE ADULT - SUBJECTIVE AND OBJECTIVE BOX
Patient is a 88y old  Male who presents with a chief complaint of agitation (30 Jul 2024 17:39)       INTERVAL HPI/OVERNIGHT EVENTS:  Patient seen and evaluated at bedside.  Pt is resting comfortable in NAD. Denies N/V/F/C.  Pain rated at X/10    Allergies    No Known Allergies    Intolerances        Vital Signs Last 24 Hrs  T(C): 36.7 (31 Jul 2024 05:13), Max: 36.7 (31 Jul 2024 05:13)  T(F): 98 (31 Jul 2024 05:13), Max: 98 (31 Jul 2024 05:13)  HR: 82 (31 Jul 2024 05:13) (80 - 85)  BP: 134/71 (31 Jul 2024 05:13) (118/62 - 134/71)  BP(mean): --  RR: 18 (31 Jul 2024 05:13) (17 - 18)  SpO2: 99% (31 Jul 2024 05:13) (97% - 99%)    Parameters below as of 31 Jul 2024 05:13  Patient On (Oxygen Delivery Method): room air        LABS:              CAPILLARY BLOOD GLUCOSE          Lower Extremity Physical Exam:    Vascular: DP/PT 2/4, B/L, CFT <3 seconds B/L, Temperature gradient  warm to cool, B/L.   Neuro: Epicritic sensation diminished to the level of digits, B/L.  Musculoskeletal/Ortho: Limited ankle ROM, Hammertoe deformity of the right 2nd and 3rd digit  Skin: Right foot second dorsal proximal interphalangeal joint wound to bone with granular wound borde, edema improved, erythema resolved, , no purulence, no malodor. Left foot no open wound and no signs of acute infection.     RADIOLOGY & ADDITIONAL TESTS:   Patient is a 88y old  Male who presents with a chief complaint of agitation (30 Jul 2024 17:39)       INTERVAL HPI/OVERNIGHT EVENTS:  Patient seen and evaluated at bedside.  Pt is resting comfortable in NAD.      Allergies    No Known Allergies    Intolerances        Vital Signs Last 24 Hrs  T(C): 36.7 (31 Jul 2024 05:13), Max: 36.7 (31 Jul 2024 05:13)  T(F): 98 (31 Jul 2024 05:13), Max: 98 (31 Jul 2024 05:13)  HR: 82 (31 Jul 2024 05:13) (80 - 85)  BP: 134/71 (31 Jul 2024 05:13) (118/62 - 134/71)  BP(mean): --  RR: 18 (31 Jul 2024 05:13) (17 - 18)  SpO2: 99% (31 Jul 2024 05:13) (97% - 99%)    Parameters below as of 31 Jul 2024 05:13  Patient On (Oxygen Delivery Method): room air        LABS:              CAPILLARY BLOOD GLUCOSE          Lower Extremity Physical Exam:    Vascular: DP/PT 2/4, B/L, CFT <3 seconds B/L, Temperature gradient  warm to cool, B/L.   Neuro: Epicritic sensation diminished to the level of digits, B/L.  Musculoskeletal/Ortho: Limited ankle ROM, Hammertoe deformity of the right 2nd and 3rd digit  Skin: Right foot second dorsal proximal interphalangeal joint wound to bone with granular wound border, edema improved, erythema resolved, , no purulence, no malodor. Left foot no open wound and no signs of acute infection.     RADIOLOGY & ADDITIONAL TESTS:

## 2024-07-31 NOTE — PROGRESS NOTE ADULT - PROBLEM SELECTOR PLAN 1
R foot wound  - ESR 15, CRP 12  - podiatry following  - SHERYL/PVR 7/27 reviewed   - MR R foot attempted on 7/31, could not tolerate due to agitation   - vascular consulted per podiatry rec angiogram---pt cannot tolerate, d/w wife will defer given overall status and risk of worsening already poor mental staus given need for sedation/anesthesia for these interventions  - wound culture 7/24: MSSA  - c/w abx Unasyn (7/26-7/27)-->vancomycin (7/27-7/28)-->cefazolin (7/29-), per ID can dc on 4 wk course of Keflex PO when ready for dc

## 2024-07-31 NOTE — PROGRESS NOTE ADULT - ASSESSMENT
89 year old with DM with right first digit wound  Prior debridement with culture that grew pseudomonas  Treated with Cipro   Prior angiogram and angioplasty    He developed two new wounds to his right 2nd and fourth toes  X ray raised cocnern for OM    Wounds do not appear acutely infected  Some concern for underlying OM    Family does not want aggressive interventions at this time    Can give abx for 6 weeks  When stable for dc, can change to keflex 500 mg po q 6    Call ID service with questions  Will sign off    Casey Lr MD  Please contact via TEAM between 8 am and 6 pm    In the evening or on weekends, can contact call service at [phone]

## 2024-07-31 NOTE — PROGRESS NOTE ADULT - ASSESSMENT
88M with right 2nd digit dorsal wound with cellulitis  - Patient was evaluated and seen  - Afebrile, no leukocytosis   - Right foot second dorsal proximal interphalangeal joint wound to bone with granular wound borde, edema improved, erythema resolved, , no purulence, no malodor. Left foot no open wound and no signs of acute infection.   -  Right foot wound culture: MSSA (prelim).  - Right foot xray: No OM, no gas  -SHERYL/PVR : RABI  and LABI was not accurately assessed due to non-compressible (calcific) vessels. RTBI 0.83, LTBI 0.72  - Right foot MRI: patient unable to tolerate due to agitation  - ID recs, appreciated  - Vascular recs. appreciated   - Podiatry plan: Local wound care pending vascular recs/ final ID recs  - Discussed with attending   88M with right 2nd digit dorsal wound with cellulitis   - Patient was evaluated and seen  - Afebrile, no leukocytosis   - Right foot second dorsal proximal interphalangeal joint wound to bone with granular wound border, edema improved, erythema resolved, , no purulence, no malodor. Left foot no open wound and no signs of acute infection.   -  Right foot wound culture: MSSA.  - Right foot xray: No OM, no gas  -SHERYL/PVR : RABI  and LABI was not accurately assessed due to non-compressible (calcific) vessels. RTBI 0.83, LTBI 0.72  - Right foot MRI: patient unable to tolerate due to agitation  - ID recs, appreciated  - Vascular recs. appreciated   - Podiatry plan: high clinical suspicion for OM due to cellulitis and open PIPJ joint, improving with abx, wound depth decreasing. Patient is unable to tolerate bedside bone biopsy due to dementia. Alteratively would perform athroplasty/ biopsy int he OR however anesthesia is likely to worsen his mental status. Recommend conservative tx per ID with long term abx.   -Betadine dressing applied.  -Strict decub precautions, CAIR boots  - Discussed with attending

## 2024-07-31 NOTE — BH CONSULTATION LIAISON PROGRESS NOTE - NSBHFUPINTERVALHXFT_PSY_A_CORE
patient Aox1 still, no EPS on exam though cannot follow commands easily, intermittently agitated mostly during comprehensive exams, no PRNs since last eval

## 2024-07-31 NOTE — PROGRESS NOTE ADULT - ASSESSMENT
89 yo M, hx of A/ fib on Eliquis, HTN, CHF, pacemaker, bladder neoplasm, noted to have RLE 2nd toe wound. Vascular surgery consulted for further recommendations, family meeting concluded that patient is to forego further invasive testing unless mental status were to improve, patient is DNR/DNI    Recommendations   - recommend  pt/ot eval for ambulation before making any decisions for angiography  - family meeting  documentation reviewed a  - will hold off if  family does not want to proceed w any  additional intervention  - continue woundcare     87 yo M, hx of A/ fib on Eliquis, HTN, CHF, pacemaker, bladder neoplasm, noted to have RLE 2nd toe wound. Vascular surgery consulted for further recommendations, family meeting concluded that patient is to forego further invasive testing unless mental status were to improve, patient is DNR/DNI    Recommendations   -- family meeting  documentation reviewed a  - will hold off any  intervention family does not want to proceed w any  additional intervention  - continue woundcare  reconsult prn

## 2024-07-31 NOTE — PROGRESS NOTE ADULT - SUBJECTIVE AND OBJECTIVE BOX
Percy Teran MD  Interventional Cardiology / Advance Heart Failure and Cardiac Transplant Specialist  [place] Office : [address] [place] [address] [place] [address]  Tel:   [place] Office : [address] [facility] [place] [address]  Tel: [phone]       Pt is lying in bed comfortable not in distress, no chest pains no SOB no palpitations  	  MEDICATIONS:  aMIOdarone    Tablet 100 milliGRAM(s) Oral daily  apixaban 2.5 milliGRAM(s) Oral two times a day  aspirin  chewable 81 milliGRAM(s) Oral daily  metoprolol succinate ER 25 milliGRAM(s) Oral daily    ceFAZolin   IVPB      ceFAZolin   IVPB 2000 milliGRAM(s) IV Intermittent every 8 hours      acetaminophen     Tablet .. 650 milliGRAM(s) Oral every 6 hours PRN  divalproex Sprinkle 250 milliGRAM(s) Oral every 8 hours  melatonin 3 milliGRAM(s) Oral at bedtime  OLANZapine Injectable 1.25 milliGRAM(s) IntraMuscular every 6 hours PRN  risperiDONE  Disintegrating Tablet 0.5 milliGRAM(s) Oral <User Schedule>  risperiDONE  Disintegrating Tablet 1 milliGRAM(s) Oral <User Schedule>      atorvastatin 20 milliGRAM(s) Oral at bedtime    mupirocin 2% Ointment 1 Application(s) Topical <User Schedule>      PAST MEDICAL/SURGICAL HISTORY  PAST MEDICAL & SURGICAL HISTORY:  Bacterial UTI      Atrial fibrillation      HTN (hypertension)      Dementia          SOCIAL HISTORY: Substance Use (street drugs): ( x ) never used  (  ) other:    FAMILY HISTORY:      REVIEW OF SYSTEMS:  CONSTITUTIONAL: No fever, weight loss, or fatigue  EYES: No eye pain, visual disturbances, or discharge  ENMT:  No difficulty hearing, tinnitus, vertigo; No sinus or throat pain  BREASTS: No pain, masses, or nipple discharge  GASTROINTESTINAL: No abdominal or epigastric pain. No nausea, vomiting, or hematemesis; No diarrhea or constipation. No melena or hematochezia.  GENITOURINARY: No dysuria, frequency, hematuria, or incontinence  NEUROLOGICAL: No headaches, memory loss, loss of strength, numbness, or tremors  ENDOCRINE: No heat or cold intolerance; No hair loss  MUSCULOSKELETAL: No joint pain or swelling; No muscle, back, or extremity pain  PSYCHIATRIC: No depression, anxiety, mood swings, or difficulty sleeping  HEME/LYMPH: No easy bruising, or bleeding gums       PHYSICAL EXAM:  T(C): 36.2 (07-31-24 @ 11:08), Max: 36.7 (07-31-24 @ 05:13)  HR: 81 (07-31-24 @ 11:08) (81 - 85)  BP: 125/79 (07-31-24 @ 11:08) (118/62 - 134/71)  RR: 18 (07-31-24 @ 11:08) (17 - 18)  SpO2: 97% (07-31-24 @ 11:08) (97% - 99%)  Wt(kg): --  I&O's Summary        GENERAL: NAD  EYES:   PERRLA   ENMT:   Moist mucous membranes, Good dentition, No lesions  Cardiovascular: Normal S1 S2, No JVD, No murmurs, No edema  Respiratory: Lungs clear to auscultation	  Gastrointestinal:  Soft, Non-tender, + BS	  Extremities: b/l UE/LE scaly thin skin                      proBNP:   Lipid Profile:   HgA1c:   TSH:     Consultant(s) Notes Reviewed:  [x ] YES  [ ] NO    Care Discussed with Consultants/Other Providers [ x] YES  [ ] NO    Imaging Personally Reviewed independently:  [x] YES  [ ] NO    All labs, radiologic studies, vitals, orders and medications list reviewed. Patient is seen and examined at bedside. Case discussed with medical team.

## 2024-07-31 NOTE — PROGRESS NOTE ADULT - SUBJECTIVE AND OBJECTIVE BOX
TEAM [ [facility] ] Surgery Daily Progress Note  =====================================================    SUBJECTIVE: Patient seen and examined at bedside on AM rounds.     PMH:  PAST MEDICAL & SURGICAL HISTORY:  Bacterial UTI      Atrial fibrillation      HTN (hypertension)      Dementia          ALLERGIES:  No Known Allergies      --------------------------------------------------------------------------------------    MEDICATIONS:    Neurologic Medications  acetaminophen     Tablet .. 650 milliGRAM(s) Oral every 6 hours PRN Temp greater or equal to 38C (100.4F), Mild Pain (1 - 3)  divalproex Sprinkle 250 milliGRAM(s) Oral every 8 hours  melatonin 3 milliGRAM(s) Oral at bedtime  OLANZapine Injectable 1.25 milliGRAM(s) IntraMuscular every 6 hours PRN aggression  risperiDONE  Disintegrating Tablet 1 milliGRAM(s) Oral <User Schedule>  risperiDONE  Disintegrating Tablet 0.5 milliGRAM(s) Oral <User Schedule>    Respiratory Medications    Cardiovascular Medications  aMIOdarone    Tablet 100 milliGRAM(s) Oral daily  metoprolol succinate ER 25 milliGRAM(s) Oral daily    Gastrointestinal Medications    Genitourinary Medications    Hematologic/Oncologic Medications  apixaban 2.5 milliGRAM(s) Oral two times a day  aspirin  chewable 81 milliGRAM(s) Oral daily    Antimicrobial/Immunologic Medications  ceFAZolin   IVPB 2000 milliGRAM(s) IV Intermittent every 8 hours  ceFAZolin   IVPB        Endocrine/Metabolic Medications  atorvastatin 20 milliGRAM(s) Oral at bedtime    Topical/Other Medications  mupirocin 2% Ointment 1 Application(s) Topical <User Schedule>    --------------------------------------------------------------------------------------    VITAL SIGNS:  Vital Signs Last 24 Hrs  T(C): 36.2 (31 Jul 2024 11:08), Max: 36.7 (31 Jul 2024 05:13)  T(F): 97.1 (31 Jul 2024 11:08), Max: 98 (31 Jul 2024 05:13)  HR: 81 (31 Jul 2024 11:08) (81 - 85)  BP: 125/79 (31 Jul 2024 11:08) (118/62 - 134/71)  BP(mean): --  RR: 18 (31 Jul 2024 11:08) (17 - 18)  SpO2: 97% (31 Jul 2024 11:08) (97% - 99%)    Parameters below as of 31 Jul 2024 11:08  Patient On (Oxygen Delivery Method): room air      --------------------------------------------------------------------------------------    PHYSICAL EXAM:  General: No acute distress  Respiratory: Nonlabored  Cardiovascular: RRR  Extremities: RLE 2nd digit wound dry, no purulence, b/l femoral, pop pulses, RLE DP signal PT pulse, LLE DP signal PT signal    --------------------------------------------------------------------------------------   TEAM [ [facility] ] Surgery Daily Progress Note  =====================================================    SUBJECTIVE: Patient seen and examined at bedside on AM rounds.   Pt  resting comfortably      ALLERGIES:  No Known Allergies      --------------------------------------------------------------------------------------    MEDICATIONS:    Neurologic Medications  acetaminophen     Tablet .. 650 milliGRAM(s) Oral every 6 hours PRN Temp greater or equal to 38C (100.4F), Mild Pain (1 - 3)  divalproex Sprinkle 250 milliGRAM(s) Oral every 8 hours  melatonin 3 milliGRAM(s) Oral at bedtime  OLANZapine Injectable 1.25 milliGRAM(s) IntraMuscular every 6 hours PRN aggression  risperiDONE  Disintegrating Tablet 1 milliGRAM(s) Oral <User Schedule>  risperiDONE  Disintegrating Tablet 0.5 milliGRAM(s) Oral <User Schedule>    Respiratory Medications    Cardiovascular Medications  aMIOdarone    Tablet 100 milliGRAM(s) Oral daily  metoprolol succinate ER 25 milliGRAM(s) Oral daily    Gastrointestinal Medications    Genitourinary Medications    Hematologic/Oncologic Medications  apixaban 2.5 milliGRAM(s) Oral two times a day  aspirin  chewable 81 milliGRAM(s) Oral daily    Antimicrobial/Immunologic Medications  ceFAZolin   IVPB 2000 milliGRAM(s) IV Intermittent every 8 hours  ceFAZolin   IVPB        Endocrine/Metabolic Medications  atorvastatin 20 milliGRAM(s) Oral at bedtime    Topical/Other Medications  mupirocin 2% Ointment 1 Application(s) Topical <User Schedule>    --------------------------------------------------------------------------------------    VITAL SIGNS:  Vital Signs Last 24 Hrs  T(C): 36.2 (31 Jul 2024 11:08), Max: 36.7 (31 Jul 2024 05:13)  T(F): 97.1 (31 Jul 2024 11:08), Max: 98 (31 Jul 2024 05:13)  HR: 81 (31 Jul 2024 11:08) (81 - 85)  BP: 125/79 (31 Jul 2024 11:08) (118/62 - 134/71)  BP(mean): --  RR: 18 (31 Jul 2024 11:08) (17 - 18)  SpO2: 97% (31 Jul 2024 11:08) (97% - 99%)    Parameters below as of 31 Jul 2024 11:08  Patient On (Oxygen Delivery Method): room air      --------------------------------------------------------------------------------------    PHYSICAL EXAM:  General: No acute distress  Respiratory: Nonlabored  Cardiovascular: RRR  Extremities: RLE 2nd digit wound dry, no purulence, b/l femoral, pop pulses, RLE DP signal PT pulse, LLE DP signal PT signal    --------------------------------------------------------------------------------------

## 2024-07-31 NOTE — PROGRESS NOTE ADULT - PROBLEM SELECTOR PLAN 9
Eliquis  DNR/DNI  from LTC/NH, needs new facility as per CM  dc pending behavioral control   care d/w wife on 7/29 and 7/30

## 2024-07-31 NOTE — PROGRESS NOTE ADULT - PROBLEM SELECTOR PLAN 2
Acute encephalopathy superimposed on chronic dementia,   - CTH: Anterior communicating artery aneurysm coil embolization. No acute intracranial hemorrhage. 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 Advanced chronic microvascular ischemic changes.  - psych following, risperidone 0.5/1 mg + Depakote 250 mg TID    - prn on 7/30 were in context of MRI

## 2024-08-01 LAB — GLUCOSE BLDC GLUCOMTR-MCNC: 86 MG/DL — SIGNIFICANT CHANGE UP (ref 70–99)

## 2024-08-01 PROCEDURE — 99232 SBSQ HOSP IP/OBS MODERATE 35: CPT

## 2024-08-01 RX ORDER — METOPROLOL TARTRATE 100 MG
25 TABLET ORAL
Refills: 0 | Status: DISCONTINUED | OUTPATIENT
Start: 2024-08-01 | End: 2024-08-06

## 2024-08-01 RX ORDER — DIVALPROEX SODIUM 125 MG/1
250 CAPSULE, COATED PELLETS ORAL
Refills: 0 | Status: DISCONTINUED | OUTPATIENT
Start: 2024-08-01 | End: 2024-08-06

## 2024-08-01 RX ADMIN — DIVALPROEX SODIUM 250 MILLIGRAM(S): 125 CAPSULE, COATED PELLETS ORAL at 05:48

## 2024-08-01 RX ADMIN — APIXABAN 2.5 MILLIGRAM(S): 5 TABLET, FILM COATED ORAL at 05:48

## 2024-08-01 RX ADMIN — AMIODARONE HYDROCHLORIDE 100 MILLIGRAM(S): 50 INJECTION, SOLUTION INTRAVENOUS at 05:47

## 2024-08-01 RX ADMIN — Medication 25 MILLIGRAM(S): at 18:53

## 2024-08-01 RX ADMIN — Medication 100 MILLIGRAM(S): at 16:37

## 2024-08-01 RX ADMIN — Medication 100 MILLIGRAM(S): at 05:48

## 2024-08-01 RX ADMIN — DIVALPROEX SODIUM 250 MILLIGRAM(S): 125 CAPSULE, COATED PELLETS ORAL at 21:24

## 2024-08-01 RX ADMIN — DIVALPROEX SODIUM 250 MILLIGRAM(S): 125 CAPSULE, COATED PELLETS ORAL at 16:38

## 2024-08-01 RX ADMIN — Medication 81 MILLIGRAM(S): at 11:54

## 2024-08-01 RX ADMIN — ATORVASTATIN CALCIUM 20 MILLIGRAM(S): 40 TABLET, FILM COATED ORAL at 21:23

## 2024-08-01 RX ADMIN — Medication 100 MILLIGRAM(S): at 21:42

## 2024-08-01 RX ADMIN — MUPIROCIN CALCIUM 1 APPLICATION(S): 20 CREAM TOPICAL at 11:43

## 2024-08-01 RX ADMIN — Medication 25 MILLIGRAM(S): at 05:48

## 2024-08-01 RX ADMIN — APIXABAN 2.5 MILLIGRAM(S): 5 TABLET, FILM COATED ORAL at 18:45

## 2024-08-01 RX ADMIN — Medication 3 MILLIGRAM(S): at 21:23

## 2024-08-01 RX ADMIN — RISPERIDONE 0.5 MILLIGRAM(S): 1 TABLET, FILM COATED ORAL at 11:42

## 2024-08-01 NOTE — PROGRESS NOTE ADULT - SUBJECTIVE AND OBJECTIVE BOX
Percy Teran MD  Interventional Cardiology / Advance Heart Failure and Cardiac Transplant Specialist  [place] Office : [address] [place] [address] [place] [address]  Tel:   [place] Office : [address] [facility] [place] [address]  Tel: [phone]       Pt is lying in bed comfortable not in distress, no chest pains no SOB no palpitations  	  MEDICATIONS:  aMIOdarone    Tablet 100 milliGRAM(s) Oral daily  apixaban 2.5 milliGRAM(s) Oral two times a day  aspirin  chewable 81 milliGRAM(s) Oral daily  metoprolol tartrate 25 milliGRAM(s) Oral two times a day    ceFAZolin   IVPB 2000 milliGRAM(s) IV Intermittent every 8 hours  ceFAZolin   IVPB          acetaminophen     Tablet .. 650 milliGRAM(s) Oral every 6 hours PRN  divalproex Sprinkle 250 milliGRAM(s) Oral <User Schedule>  melatonin 3 milliGRAM(s) Oral at bedtime  OLANZapine Injectable 1.25 milliGRAM(s) IntraMuscular every 6 hours PRN  risperiDONE  Disintegrating Tablet 0.5 milliGRAM(s) Oral <User Schedule>  risperiDONE  Disintegrating Tablet 1 milliGRAM(s) Oral <User Schedule>      atorvastatin 20 milliGRAM(s) Oral at bedtime    mupirocin 2% Ointment 1 Application(s) Topical <User Schedule>      PAST MEDICAL/SURGICAL HISTORY  PAST MEDICAL & SURGICAL HISTORY:  Bacterial UTI      Atrial fibrillation      HTN (hypertension)      Dementia          SOCIAL HISTORY: Substance Use (street drugs): ( x ) never used  (  ) other:    FAMILY HISTORY:      REVIEW OF SYSTEMS:  CONSTITUTIONAL: No fever, weight loss, or fatigue  EYES: No eye pain, visual disturbances, or discharge  ENMT:  No difficulty hearing, tinnitus, vertigo; No sinus or throat pain  BREASTS: No pain, masses, or nipple discharge  GASTROINTESTINAL: No abdominal or epigastric pain. No nausea, vomiting, or hematemesis; No diarrhea or constipation. No melena or hematochezia.  GENITOURINARY: No dysuria, frequency, hematuria, or incontinence  NEUROLOGICAL: No headaches, memory loss, loss of strength, numbness, or tremors  ENDOCRINE: No heat or cold intolerance; No hair loss  MUSCULOSKELETAL: No joint pain or swelling; No muscle, back, or extremity pain  PSYCHIATRIC: No depression, anxiety, mood swings, or difficulty sleeping  HEME/LYMPH: No easy bruising, or bleeding gums       PHYSICAL EXAM:  T(C): 36.5 (08-01-24 @ 11:17), Max: 36.6 (08-01-24 @ 05:27)  HR: 80 (08-01-24 @ 11:17) (80 - 95)  BP: 121/66 (08-01-24 @ 11:17) (119/66 - 124/76)  RR: 18 (08-01-24 @ 11:17) (18 - 18)  SpO2: 100% (08-01-24 @ 11:17) (100% - 100%)  Wt(kg): --  I&O's Summary        GENERAL: NAD  EYES:   PERRLA   ENMT:   Moist mucous membranes, Good dentition, No lesions  Cardiovascular: Normal S1 S2, No JVD, No murmurs, No edema  Respiratory: Lungs clear to auscultation	  Gastrointestinal:  Soft, Non-tender, + BS	  Extremities: no edema, b/l upper/lower extremities crusty/scaly skin          proBNP:   Lipid Profile:   HgA1c:   TSH:     Consultant(s) Notes Reviewed:  [x ] YES  [ ] NO    Care Discussed with Consultants/Other Providers [ x] YES  [ ] NO    Imaging Personally Reviewed independently:  [x] YES  [ ] NO    All labs, radiologic studies, vitals, orders and medications list reviewed. Patient is seen and examined at bedside. Case discussed with medical team.                       Percy Teran MD  Interventional Cardiology / Advance Heart Failure and Cardiac Transplant Specialist  [place] Office : [address] [place] [address]  Tel: [phone]  [place] Office : [address] [facility] [place] [address]  Tel: [phone]       Pt is lying in bed comfortable not in distress, no chest pains no SOB no palpitations  	  MEDICATIONS:  aMIOdarone    Tablet 100 milliGRAM(s) Oral daily  apixaban 2.5 milliGRAM(s) Oral two times a day  aspirin  chewable 81 milliGRAM(s) Oral daily  metoprolol tartrate 25 milliGRAM(s) Oral two times a day    ceFAZolin   IVPB 2000 milliGRAM(s) IV Intermittent every 8 hours  ceFAZolin   IVPB          acetaminophen     Tablet .. 650 milliGRAM(s) Oral every 6 hours PRN  divalproex Sprinkle 250 milliGRAM(s) Oral <User Schedule>  melatonin 3 milliGRAM(s) Oral at bedtime  OLANZapine Injectable 1.25 milliGRAM(s) IntraMuscular every 6 hours PRN  risperiDONE  Disintegrating Tablet 0.5 milliGRAM(s) Oral <User Schedule>  risperiDONE  Disintegrating Tablet 1 milliGRAM(s) Oral <User Schedule>      atorvastatin 20 milliGRAM(s) Oral at bedtime    mupirocin 2% Ointment 1 Application(s) Topical <User Schedule>      PAST MEDICAL/SURGICAL HISTORY  PAST MEDICAL & SURGICAL HISTORY:  Bacterial UTI      Atrial fibrillation      HTN (hypertension)      Dementia          SOCIAL HISTORY: Substance Use (street drugs): ( x ) never used  (  ) other:    FAMILY HISTORY:       PHYSICAL EXAM:  T(C): 36.5 (08-01-24 @ 11:17), Max: 36.6 (08-01-24 @ 05:27)  HR: 80 (08-01-24 @ 11:17) (80 - 95)  BP: 121/66 (08-01-24 @ 11:17) (119/66 - 124/76)  RR: 18 (08-01-24 @ 11:17) (18 - 18)  SpO2: 100% (08-01-24 @ 11:17) (100% - 100%)  Wt(kg): --  I&O's Summary        GENERAL: NAD  EYES:   PERRLA   ENMT:   Moist mucous membranes, Good dentition, No lesions  Cardiovascular: Normal S1 S2, No JVD, No murmurs, No edema  Respiratory: Lungs clear to auscultation	  Gastrointestinal:  Soft, Non-tender, + BS	  Extremities: no edema, b/l upper/lower extremities crusty/scaly skin          proBNP:   Lipid Profile:   HgA1c:   TSH:     Consultant(s) Notes Reviewed:  [x ] YES  [ ] NO    Care Discussed with Consultants/Other Providers [ x] YES  [ ] NO    Imaging Personally Reviewed independently:  [x] YES  [ ] NO    All labs, radiologic studies, vitals, orders and medications list reviewed. Patient is seen and examined at bedside. Case discussed with medical team.

## 2024-08-01 NOTE — PROGRESS NOTE ADULT - PROBLEM SELECTOR PLAN 3
- c/w Eliquis 2.5 mg BID (unclear why renally dosed), amiodarone 100 mg daily, metoprolol 25 mg BID (meds being crushed)  - corrected QTC 7/29 apx 464  - keep K>4 and Mg>2

## 2024-08-01 NOTE — PROGRESS NOTE ADULT - SUBJECTIVE AND OBJECTIVE BOX
Patient is a 88y old  Male who presents with a chief complaint of agitation (31 Jul 2024 19:05)    SUBJECTIVE / OVERNIGHT EVENTS:    unable to elicit complaint, sleepy but able to rouse, smiles  denies ROS but veracity due to dementia     RN denies issues with agitation today    MEDICATIONS  (STANDING):  aMIOdarone    Tablet 100 milliGRAM(s) Oral daily  apixaban 2.5 milliGRAM(s) Oral two times a day  aspirin  chewable 81 milliGRAM(s) Oral daily  atorvastatin 20 milliGRAM(s) Oral at bedtime  ceFAZolin   IVPB 2000 milliGRAM(s) IV Intermittent every 8 hours  divalproex Sprinkle 250 milliGRAM(s) Oral <User Schedule>  melatonin 3 milliGRAM(s) Oral at bedtime  metoprolol tartrate 25 milliGRAM(s) Oral two times a day  mupirocin 2% Ointment 1 Application(s) Topical <User Schedule>  risperiDONE  Disintegrating Tablet 0.5 milliGRAM(s) Oral <User Schedule>  risperiDONE  Disintegrating Tablet 1 milliGRAM(s) Oral <User Schedule>    MEDICATIONS  (PRN):  acetaminophen     Tablet .. 650 milliGRAM(s) Oral every 6 hours PRN Temp greater or equal to 38C (100.4F), Mild Pain (1 - 3)  OLANZapine Injectable 1.25 milliGRAM(s) IntraMuscular every 6 hours PRN aggression    T(C): 36.5 (08-01-24 @ 11:17), Max: 36.6 (08-01-24 @ 05:27)  HR: 80 (08-01-24 @ 11:17) (80 - 95)  BP: 121/66 (08-01-24 @ 11:17) (119/66 - 124/76)  RR: 18 (08-01-24 @ 11:17) (18 - 18)  SpO2: 100% (08-01-24 @ 11:17) (100% - 100%)    PHYSICAL EXAM:  GENERAL: NAD   CHEST/LUNG: Clear to auscultation bilaterally; No wheeze  HEART: Regular rate and rhythm; No murmurs, rubs, or gallops  ABDOMEN: Soft, Nontender, Nondistended; Bowel sounds present  EXTREMITIES:   warm and well perfused, No clubbing, cyanosis, or edema  PSYCH: sleepy, rouses to touch   NEUROLOGY: non-focal  SKIN: R foot dressing c/d/i, scabbing on shins     LABS:  no new labs     Care Discussed with Consultants/Other Providers:  IDR team

## 2024-08-01 NOTE — PROGRESS NOTE ADULT - ASSESSMENT
EKG V Paced QTc 562     Assessment and Plan     1) QT prolongation : 2/2 Paced rhythm corrected QTc around 450ms , ok to continue Antipsychotics, repeat EKG      2) Afib :cw metoprolol and eliquis     3) DVT PPX eliquis

## 2024-08-01 NOTE — CHART NOTE - NSCHARTNOTEFT_GEN_A_CORE
Pt seen for follow-up    Medical Course:  Per chart, Pt is 88M dementia, HTN, CHF, afib on Eliquis, PPM, bladder neoplasm, recently started on Xanax for anxiety last week presents for increased agitation and R foot wound.    Nutrition Course:  Pt seen at bedside. Pt is sleeping multiple attempts to wake up patient unsuccessful. Unable to conduct nutrition interview secondary to patient sleeping/dementia. Information obtained from comprehensive chart review and per RN today. Per RN, no recent episodes of nausea, vomiting, diarrhea or constipation. No last BM documented per RN flowsheets. No chewing or swallowing difficulties at this time per chart. Intake is poor 0-50% per RN flowsheets and per RN today. Recommend Ensure plus HP 2x daily to optimize macronutrient intake. As per progress note 8/1 Wound of right foot vascular.     Diet Prescription:   Pertinent Medications: MEDICATIONS  (STANDING):  aMIOdarone    Tablet 100 milliGRAM(s) Oral daily  apixaban 2.5 milliGRAM(s) Oral two times a day  aspirin  chewable 81 milliGRAM(s) Oral daily  atorvastatin 20 milliGRAM(s) Oral at bedtime  ceFAZolin   IVPB 2000 milliGRAM(s) IV Intermittent every 8 hours  ceFAZolin   IVPB      divalproex Sprinkle 250 milliGRAM(s) Oral <User Schedule>  melatonin 3 milliGRAM(s) Oral at bedtime  metoprolol tartrate 25 milliGRAM(s) Oral two times a day  mupirocin 2% Ointment 1 Application(s) Topical <User Schedule>  risperiDONE  Disintegrating Tablet 0.5 milliGRAM(s) Oral <User Schedule>  risperiDONE  Disintegrating Tablet 1 milliGRAM(s) Oral <User Schedule>    MEDICATIONS  (PRN):  acetaminophen     Tablet .. 650 milliGRAM(s) Oral every 6 hours PRN Temp greater or equal to 38C (100.4F), Mild Pain (1 - 3)  OLANZapine Injectable 1.25 milliGRAM(s) IntraMuscular every 6 hours PRN aggression    Pertinent Labs:  07-27 Phos 3.2 mg/dL 07-29 Alb 3.4 g/dL 07-29 Chol 111 mg/dL LDL --    HDL 44 mg/dL Trig 60 mg/dL    Weight: 76.2kg (7/31), 79.9kg (7/23).   Weight Assessment: Weight decreased x8 days (4.6%). Please obtain daily weight to monitor weight trend/accuracy.   Height: 66in / 167.6cm  IBW: 142lbs / 64.5kg +/-10%  BMI: 27.1kg/m^2    Physical Assessment, per flowsheets:  Edema: Right foot edema 1+ noted.   Pressure Injury: No pressure injury noted     Estimated Needs:   [X] Recalculated, with consideration for, based on IBW 142lbs / 64.5kg  1612 - 1935 kcal daily @ 25 - 30 kcal/kg, 65 - 77 gm protein daily @ 1.0 - 1.2 gm/kg       Previous Nutrition Diagnosis:   [ ] Inadequate Energy Intake [ ]Inadequate Oral Intake [ ] Excessive Energy Intake   [ ] Underweight [ ] Increased Nutrient Needs [ ] Overweight/Obesity   [ ] Altered GI Function [ ] Unintended Weight Loss [ ] Food & Nutrition Related Knowledge Deficit [ ] Malnutrition   Nutrition Diagnosis is [ ] ongoing  [ ] resolved [ ] not applicable   New Nutrition Diagnosis:  [X] Inadequate oral intake related to psychological causes as evidenced by poor PO intake reported per RN flowsheet.     Education:  [ ] Provided  [ ] Provided on previous assessment by RD  [X] Not applicable secondary to cognitive status.   [ ] Pt refused     Interventions:   - Continue current diet order. Defer food and fluid consistency to SLP/Team.   - Recommend Ensure Plus High Protein 2x daily (provides 350 kcal, 20 gm protein per 8 oz serving)   - Please consistently document % PO intake in nursing flowsheets to assess adequacy of nutritional intake/monitor need for further nutritional intervention(s).     Monitor & Evaluate:   Monitor PO intake, tolerance to diet/supplement, nutrition related lab values, weight trends, BMs/GI distress, hydration status, skin integrity.  Gino Jimenez RD, CDN  Available on [employer] Teams Pt seen for follow-up    Medical Course:  Per chart, Pt is 88M dementia, HTN, CHF, afib on Eliquis, PPM, bladder neoplasm, recently started on Xanax for anxiety last week presents for increased agitation and R foot wound.    Nutrition Course:  Pt seen at bedside. Pt is sleeping multiple attempts to wake up patient unsuccessful. Unable to conduct nutrition interview secondary to patient sleeping/dementia. Information obtained from comprehensive chart review and per RN today. Per RN, no recent episodes of nausea, vomiting, diarrhea or constipation. No last BM documented per RN flowsheets. No chewing or swallowing difficulties at this time per chart. Intake is poor 0-50% per RN flowsheets and per RN today. Recommend Ensure plus HP 2x daily to optimize macronutrient intake. As per progress note 8/1 Wound of right foot vascular.     Diet Prescription:   Pertinent Medications: MEDICATIONS  (STANDING):  aMIOdarone    Tablet 100 milliGRAM(s) Oral daily  apixaban 2.5 milliGRAM(s) Oral two times a day  aspirin  chewable 81 milliGRAM(s) Oral daily  atorvastatin 20 milliGRAM(s) Oral at bedtime  ceFAZolin   IVPB 2000 milliGRAM(s) IV Intermittent every 8 hours  ceFAZolin   IVPB      divalproex Sprinkle 250 milliGRAM(s) Oral <User Schedule>  melatonin 3 milliGRAM(s) Oral at bedtime  metoprolol tartrate 25 milliGRAM(s) Oral two times a day  mupirocin 2% Ointment 1 Application(s) Topical <User Schedule>  risperiDONE  Disintegrating Tablet 0.5 milliGRAM(s) Oral <User Schedule>  risperiDONE  Disintegrating Tablet 1 milliGRAM(s) Oral <User Schedule>    MEDICATIONS  (PRN):  acetaminophen     Tablet .. 650 milliGRAM(s) Oral every 6 hours PRN Temp greater or equal to 38C (100.4F), Mild Pain (1 - 3)  OLANZapine Injectable 1.25 milliGRAM(s) IntraMuscular every 6 hours PRN aggression    Pertinent Labs:  07-27 Phos 3.2 mg/dL 07-29 Alb 3.4 g/dL 07-29 Chol 111 mg/dL LDL --    HDL 44 mg/dL Trig 60 mg/dL    Weight: 76.2kg (7/31), 79.9kg (7/23).   Weight Assessment: Weight decreased x8 days (4.6%). Please obtain daily weight to monitor weight trend/accuracy.   Height: 66in / 167.6cm  IBW: 142lbs / 64.5kg +/-10%  BMI: 27.1kg/m^2    Physical Assessment, per flowsheets:  Edema: Right foot edema 1+ noted.   Pressure Injury: No pressure injury noted     Estimated Needs:   [X] Recalculated, with consideration for, based on IBW 142lbs / 64.5kg  1612 - 1935 kcal daily @ 25 - 30 kcal/kg, 65 - 77 gm protein daily @ 1.0 - 1.2 gm/kg       Previous Nutrition Diagnosis:   [X] No active nutrition diagnosis identified at this time  Nutrition Diagnosis is [ ] ongoing  [ ] resolved [ ] not applicable   New Nutrition Diagnosis:  [X] Inadequate oral intake related to psychological causes as evidenced by poor PO intake reported per RN flowsheet.     Education:  [ ] Provided  [ ] Provided on previous assessment by RD  [X] Not applicable secondary to cognitive status.   [ ] Pt refused     Interventions:   - Continue current diet order. Defer food and fluid consistency to SLP/Team.   - Recommend Ensure Plus High Protein 2x daily (provides 350 kcal, 20 gm protein per 8 oz serving)   - Please consistently document % PO intake in nursing flowsheets to assess adequacy of nutritional intake/monitor need for further nutritional intervention(s).   - RDN services remain available as needed.     Monitor & Evaluate:   Monitor PO intake, tolerance to diet/supplement, nutrition related lab values, weight trends, BMs/GI distress, hydration status, skin integrity.    Gino Jimenez RD, CDN. Available on [employer] teams,  Pager #[phone]

## 2024-08-01 NOTE — PROGRESS NOTE ADULT - PROBLEM SELECTOR PLAN 1
R foot wound  - ESR 15, CRP 12  - podiatry following  - SHERYL/PVR 7/27 reviewed   - MR R foot attempted on 7/31, could not tolerate due to agitation --d/w family deferring for now  - vascular consulted per podiatry rec angiogram---pt cannot tolerate, d/w wife will defer given overall status and risk of worsening already poor mental status given need for sedation/anesthesia for these interventions  - wound culture 7/24: MSSA  - c/w abx Unasyn (7/26-7/27)-->vancomycin (7/27-7/28)-->cefazolin (7/29-), per ID can dc on 6 wk course of Keflex PO when ready for dc

## 2024-08-01 NOTE — PROGRESS NOTE ADULT - PROBLEM SELECTOR PLAN 2
Acute encephalopathy superimposed on chronic dementia,   - CTH: Anterior communicating artery aneurysm coil embolization. No acute intracranial hemorrhage. 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 Advanced chronic microvascular ischemic changes.  - psych following, risperidone 0.5/1 mg + Depakote 250 mg TID    - prn on 7/30 were in context of MRI  - remains calm

## 2024-08-01 NOTE — RAPID RESPONSE TEAM SUMMARY - NSSITUATIONBACKGROUNDRRT_GEN_ALL_CORE
88M dementia, HTN, CHF, afib on Eliquis, PPM, bladder neoplasm, recently started on Xanax for anxiety last week presents for increased agitation and R foot wound. Currently being treated for OM and dementia with psychiatry following and adjusting medications.     RRT called for decreased responsiveness and concern for facial droop by primary nurse. Initially a code stroke which was then followed by a RRT. Upon arrival, stroke team performing assessment of patient. Vitals stable without fever, finger stick appropriate. On review, patients baseline appears to be without focal deficits. Physical exam with patient following commands with what appears to be baseline cognitive deficits as per the primary nurse. Upon discuss with the neurology team at bedside, decision to not pursue stroke workup was made. Code stroke was cancelled. With stable vitals and primary team at bedside, RRT was also ended. Primary team to follow-up need for a non-urgent CT head if further clinical concern remain. Patient stable to remain on unit.

## 2024-08-02 LAB
ADD ON TEST-SPECIMEN IN LAB: SIGNIFICANT CHANGE UP
ALBUMIN SERPL ELPH-MCNC: 3.2 G/DL — LOW (ref 3.3–5)
ALP SERPL-CCNC: 67 U/L — SIGNIFICANT CHANGE UP (ref 40–120)
ALT FLD-CCNC: <5 U/L — LOW (ref 4–41)
ANION GAP SERPL CALC-SCNC: 10 MMOL/L — SIGNIFICANT CHANGE UP (ref 7–14)
AST SERPL-CCNC: 18 U/L — SIGNIFICANT CHANGE UP (ref 4–40)
BILIRUB SERPL-MCNC: 0.3 MG/DL — SIGNIFICANT CHANGE UP (ref 0.2–1.2)
BUN SERPL-MCNC: 22 MG/DL — SIGNIFICANT CHANGE UP (ref 7–23)
CALCIUM SERPL-MCNC: 9 MG/DL — SIGNIFICANT CHANGE UP (ref 8.4–10.5)
CHLORIDE SERPL-SCNC: 108 MMOL/L — HIGH (ref 98–107)
CO2 SERPL-SCNC: 23 MMOL/L — SIGNIFICANT CHANGE UP (ref 22–31)
CREAT SERPL-MCNC: 1.19 MG/DL — SIGNIFICANT CHANGE UP (ref 0.5–1.3)
EGFR: 59 ML/MIN/1.73M2 — LOW
GLUCOSE SERPL-MCNC: 123 MG/DL — HIGH (ref 70–99)
HCT VFR BLD CALC: 39.1 % — SIGNIFICANT CHANGE UP (ref 39–50)
HGB BLD-MCNC: 12.8 G/DL — LOW (ref 13–17)
MAGNESIUM SERPL-MCNC: 2 MG/DL — SIGNIFICANT CHANGE UP (ref 1.6–2.6)
MCHC RBC-ENTMCNC: 29.8 PG — SIGNIFICANT CHANGE UP (ref 27–34)
MCHC RBC-ENTMCNC: 32.7 GM/DL — SIGNIFICANT CHANGE UP (ref 32–36)
MCV RBC AUTO: 91.1 FL — SIGNIFICANT CHANGE UP (ref 80–100)
NRBC # BLD: 0 /100 WBCS — SIGNIFICANT CHANGE UP (ref 0–0)
NRBC # FLD: 0 K/UL — SIGNIFICANT CHANGE UP (ref 0–0)
PLATELET # BLD AUTO: 221 K/UL — SIGNIFICANT CHANGE UP (ref 150–400)
POTASSIUM SERPL-MCNC: 4.2 MMOL/L — SIGNIFICANT CHANGE UP (ref 3.5–5.3)
POTASSIUM SERPL-SCNC: 4.2 MMOL/L — SIGNIFICANT CHANGE UP (ref 3.5–5.3)
PROT SERPL-MCNC: 6.4 G/DL — SIGNIFICANT CHANGE UP (ref 6–8.3)
RBC # BLD: 4.29 M/UL — SIGNIFICANT CHANGE UP (ref 4.2–5.8)
RBC # FLD: 14.4 % — SIGNIFICANT CHANGE UP (ref 10.3–14.5)
SODIUM SERPL-SCNC: 141 MMOL/L — SIGNIFICANT CHANGE UP (ref 135–145)
T4 FREE SERPL-MCNC: 1.3 NG/DL — SIGNIFICANT CHANGE UP (ref 0.9–1.8)
VALPROATE SERPL-MCNC: 48.4 UG/ML — LOW (ref 50–100)
WBC # BLD: 7.61 K/UL — SIGNIFICANT CHANGE UP (ref 3.8–10.5)
WBC # FLD AUTO: 7.61 K/UL — SIGNIFICANT CHANGE UP (ref 3.8–10.5)

## 2024-08-02 PROCEDURE — 99232 SBSQ HOSP IP/OBS MODERATE 35: CPT

## 2024-08-02 PROCEDURE — 93010 ELECTROCARDIOGRAM REPORT: CPT

## 2024-08-02 RX ORDER — RISPERIDONE 1 MG/1
1.5 TABLET, FILM COATED ORAL
Refills: 0 | Status: DISCONTINUED | OUTPATIENT
Start: 2024-08-02 | End: 2024-08-06

## 2024-08-02 RX ADMIN — ATORVASTATIN CALCIUM 20 MILLIGRAM(S): 40 TABLET, FILM COATED ORAL at 22:09

## 2024-08-02 RX ADMIN — Medication 25 MILLIGRAM(S): at 06:11

## 2024-08-02 RX ADMIN — Medication 100 MILLIGRAM(S): at 22:08

## 2024-08-02 RX ADMIN — APIXABAN 2.5 MILLIGRAM(S): 5 TABLET, FILM COATED ORAL at 06:10

## 2024-08-02 RX ADMIN — RISPERIDONE 1.5 MILLIGRAM(S): 1 TABLET, FILM COATED ORAL at 22:03

## 2024-08-02 RX ADMIN — MUPIROCIN CALCIUM 1 APPLICATION(S): 20 CREAM TOPICAL at 08:25

## 2024-08-02 RX ADMIN — AMIODARONE HYDROCHLORIDE 100 MILLIGRAM(S): 50 INJECTION, SOLUTION INTRAVENOUS at 06:09

## 2024-08-02 RX ADMIN — APIXABAN 2.5 MILLIGRAM(S): 5 TABLET, FILM COATED ORAL at 17:29

## 2024-08-02 RX ADMIN — Medication 81 MILLIGRAM(S): at 13:46

## 2024-08-02 RX ADMIN — RISPERIDONE 0.5 MILLIGRAM(S): 1 TABLET, FILM COATED ORAL at 08:25

## 2024-08-02 RX ADMIN — DIVALPROEX SODIUM 250 MILLIGRAM(S): 125 CAPSULE, COATED PELLETS ORAL at 22:02

## 2024-08-02 RX ADMIN — RISPERIDONE 1 MILLIGRAM(S): 1 TABLET, FILM COATED ORAL at 00:19

## 2024-08-02 RX ADMIN — Medication 100 MILLIGRAM(S): at 13:38

## 2024-08-02 RX ADMIN — Medication 100 MILLIGRAM(S): at 06:10

## 2024-08-02 RX ADMIN — DIVALPROEX SODIUM 250 MILLIGRAM(S): 125 CAPSULE, COATED PELLETS ORAL at 08:25

## 2024-08-02 RX ADMIN — DIVALPROEX SODIUM 250 MILLIGRAM(S): 125 CAPSULE, COATED PELLETS ORAL at 13:45

## 2024-08-02 RX ADMIN — Medication 1.25 MILLIGRAM(S): at 09:10

## 2024-08-02 RX ADMIN — Medication 3 MILLIGRAM(S): at 22:09

## 2024-08-02 RX ADMIN — Medication 25 MILLIGRAM(S): at 17:29

## 2024-08-02 NOTE — PROGRESS NOTE ADULT - PROBLEM SELECTOR PLAN 1
R foot wound, per podiatry clinical concern for OM  - ESR 15, CRP 12  - SHERYL/PVR 7/27 reviewed   - MR R foot attempted on 7/31, could not tolerate due to agitation --d/w family deferring for now given repeat attempts would require increased sedation   - podiatry following, appreciated  - vascular consulted per podiatry rec angiogram--deferring   - s/p discussion w/ family; pt cannot tolerate most interventions due to agitation and noncooperation,  overall status and risk of worsening already poor mental status given need for sedation/anesthesia for these interventions (angio, OR bone bx) thus proceeding w/ conservative management with PO abx  - wound culture 7/24: MSSA  - c/w abx Unasyn (7/26-7/27)-->vancomycin (7/27-7/28)-->cefazolin (7/29-), per ID can dc on 6 wk course of Keflex PO when ready for dc

## 2024-08-02 NOTE — PROGRESS NOTE ADULT - ASSESSMENT
EKG V Paced QTc 452     Assessment and Plan     1) QT prolongation : 2/2 Paced rhythm corrected QTc around 450ms , ok to continue Antipsychotics, repeat EKG      2) Afib :cw metoprolol and eliquis     3) DVT PPX eliquis  EKG V Paced QTc 452     Assessment and Plan     1) QT prolongation : 2/2 Paced rhythm corrected QTc around 452ms , ok to continue Antipsychotics     2) Afib :cw metoprolol and eliquis     3) DVT PPX eliquis
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
## 2024-08-02 NOTE — PROGRESS NOTE ADULT - SUBJECTIVE AND OBJECTIVE BOX
Percy Teran MD  Interventional Cardiology / Advance Heart Failure and Cardiac Transplant Specialist  [place] Office : [phone] [place] [address] [place]. [address]  Tel:   [place] Office : [address] [facility] [place] [address]  Tel: [phone]       Pt is lying in bed comfortable not in distress, no chest pains no SOB no palpitations  	  MEDICATIONS:  aMIOdarone    Tablet 100 milliGRAM(s) Oral daily  apixaban 2.5 milliGRAM(s) Oral two times a day  aspirin  chewable 81 milliGRAM(s) Oral daily  metoprolol tartrate 25 milliGRAM(s) Oral two times a day    ceFAZolin   IVPB      ceFAZolin   IVPB 2000 milliGRAM(s) IV Intermittent every 8 hours      acetaminophen     Tablet .. 650 milliGRAM(s) Oral every 6 hours PRN  divalproex Sprinkle 250 milliGRAM(s) Oral <User Schedule>  melatonin 3 milliGRAM(s) Oral at bedtime  OLANZapine Injectable 1.25 milliGRAM(s) IntraMuscular every 6 hours PRN  risperiDONE  Disintegrating Tablet 0.5 milliGRAM(s) Oral <User Schedule>  risperiDONE  Disintegrating Tablet 1.5 milliGRAM(s) Oral <User Schedule>      atorvastatin 20 milliGRAM(s) Oral at bedtime    mupirocin 2% Ointment 1 Application(s) Topical <User Schedule>      PAST MEDICAL/SURGICAL HISTORY  PAST MEDICAL & SURGICAL HISTORY:  Bacterial UTI      Atrial fibrillation      HTN (hypertension)      Dementia          SOCIAL HISTORY: Substance Use (street drugs): ( x ) never used  (  ) other:    FAMILY HISTORY:      REVIEW OF SYSTEMS:  CONSTITUTIONAL: No fever, weight loss, or fatigue  EYES: No eye pain, visual disturbances, or discharge  ENMT:  No difficulty hearing, tinnitus, vertigo; No sinus or throat pain  BREASTS: No pain, masses, or nipple discharge  GASTROINTESTINAL: No abdominal or epigastric pain. No nausea, vomiting, or hematemesis; No diarrhea or constipation. No melena or hematochezia.  GENITOURINARY: No dysuria, frequency, hematuria, or incontinence  NEUROLOGICAL: No headaches, memory loss, loss of strength, numbness, or tremors  ENDOCRINE: No heat or cold intolerance; No hair loss  MUSCULOSKELETAL: No joint pain or swelling; No muscle, back, or extremity pain  PSYCHIATRIC: No depression, anxiety, mood swings, or difficulty sleeping  HEME/LYMPH: No easy bruising, or bleeding gums       PHYSICAL EXAM:  T(C): 36.4 (08-02-24 @ 11:15), Max: 36.8 (08-02-24 @ 05:00)  HR: 75 (08-02-24 @ 11:15) (75 - 81)  BP: 144/79 (08-02-24 @ 11:15) (116/65 - 144/79)  RR: 17 (08-02-24 @ 11:15) (17 - 17)  SpO2: 100% (08-02-24 @ 11:15) (99% - 100%)  Wt(kg): --  I&O's Summary        GENERAL: NAD  EYES:   PERRLA   ENMT:   Moist mucous membranes, Good dentition, No lesions  Cardiovascular: Normal S1 S2, No JVD, No murmurs, No edema  Respiratory: Lungs clear to auscultation	  Gastrointestinal:  Soft, Non-tender, + BS	  Extremities: no edema                                    12.8   7.61  )-----------( 221      ( 02 Aug 2024 13:20 )             39.1     08-02    141  |  108<H>  |  22  ----------------------------<  123<H>  4.2   |  23  |  1.19    Ca    9.0      02 Aug 2024 13:20  Mg     2.00     08-02    TPro  6.4  /  Alb  3.2<L>  /  TBili  0.3  /  DBili  x   /  AST  18  /  ALT  <5<L>  /  AlkPhos  67  08-02    proBNP:   Lipid Profile:   HgA1c:   TSH:     Consultant(s) Notes Reviewed:  [x ] YES  [ ] NO    Care Discussed with Consultants/Other Providers [ x] YES  [ ] NO    Imaging Personally Reviewed independently:  [x] YES  [ ] NO    All labs, radiologic studies, vitals, orders and medications list reviewed. Patient is seen and examined at bedside. Case discussed with medical team.

## 2024-08-02 NOTE — PROGRESS NOTE ADULT - SUBJECTIVE AND OBJECTIVE BOX
Patient is a 88y old  Male who presents with a chief complaint of agitation (02 Aug 2024 10:12)    SUBJECTIVE / OVERNIGHT EVENTS:    ROS limited due to dementia, s/p recent prn for agitation  ate breakfast without issue and then agitated s/p that for no obvious reason  answers to his name, follows some commands but falls asleep    RRT yesterday canceled, RN called for change in MS concerns    MEDICATIONS  (STANDING):  aMIOdarone    Tablet 100 milliGRAM(s) Oral daily  apixaban 2.5 milliGRAM(s) Oral two times a day  aspirin  chewable 81 milliGRAM(s) Oral daily  atorvastatin 20 milliGRAM(s) Oral at bedtime  ceFAZolin   IVPB 2000 milliGRAM(s) IV Intermittent every 8 hours  divalproex Sprinkle 250 milliGRAM(s) Oral <User Schedule>  melatonin 3 milliGRAM(s) Oral at bedtime  metoprolol tartrate 25 milliGRAM(s) Oral two times a day  mupirocin 2% Ointment 1 Application(s) Topical <User Schedule>  risperiDONE  Disintegrating Tablet 0.5 milliGRAM(s) Oral <User Schedule>  risperiDONE  Disintegrating Tablet 1.5 milliGRAM(s) Oral <User Schedule>    MEDICATIONS  (PRN):  acetaminophen     Tablet .. 650 milliGRAM(s) Oral every 6 hours PRN Temp greater or equal to 38C (100.4F), Mild Pain (1 - 3)  OLANZapine Injectable 1.25 milliGRAM(s) IntraMuscular every 6 hours PRN aggression    T(C): 36.4 (08-02-24 @ 11:15), Max: 36.8 (08-02-24 @ 05:00)  HR: 75 (08-02-24 @ 11:15) (75 - 81)  BP: 144/79 (08-02-24 @ 11:15) (116/65 - 144/79)  RR: 17 (08-02-24 @ 11:15) (17 - 17)  SpO2: 100% (08-02-24 @ 11:15) (99% - 100%)    CAPILLARY BLOOD GLUCOSE  POCT Blood Glucose.: 86 mg/dL (01 Aug 2024 17:21)    PHYSICAL EXAM:  GENERAL: NAD   CHEST/LUNG: Clear to auscultation bilaterally; No wheeze  HEART: Regular rate and rhythm; No murmurs, rubs, or gallops  ABDOMEN: Soft, Nontender, Nondistended; Bowel sounds present  EXTREMITIES:   warm and well perfused, No clubbing, cyanosis, or edema  PSYCH: sleepy, rouses to touch   NEUROLOGY: non-focal  SKIN: R foot dressing c/d/i, scabbing on shins     LABS:  no new labs, pending this afternoon as not sent this am    Care Discussed with Consultants/Other Providers:  IDR team, Dr. Ramirez

## 2024-08-02 NOTE — PROGRESS NOTE ADULT - PROBLEM SELECTOR PLAN 2
Acute encephalopathy superimposed on chronic dementia,   - CTH: Anterior communicating artery aneurysm coil embolization. No acute intracranial hemorrhage. 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 Advanced chronic microvascular ischemic changes.  - psych following, risperidone 0.5/1.5 mg (increased pm dose 8/2) + Depakote 250 mg TID    - issue with agitation this am

## 2024-08-02 NOTE — PROGRESS NOTE ADULT - SUBJECTIVE AND OBJECTIVE BOX
Patient is a 88y old  Male who presents with a chief complaint of agitation (01 Aug 2024 12:46)       INTERVAL HPI/OVERNIGHT EVENTS:  Patient seen and evaluated at bedside.  Pt is resting comfortable in NAD. Denies N/V/F/C.  Pain rated at X/10    Allergies    No Known Allergies    Intolerances        Vital Signs Last 24 Hrs  T(C): 36.8 (02 Aug 2024 05:00), Max: 36.8 (02 Aug 2024 05:00)  T(F): 98.2 (02 Aug 2024 05:00), Max: 98.2 (02 Aug 2024 05:00)  HR: 81 (02 Aug 2024 05:00) (80 - 81)  BP: 116/65 (02 Aug 2024 05:00) (116/65 - 121/66)  BP(mean): --  RR: 17 (02 Aug 2024 05:00) (17 - 18)  SpO2: 99% (02 Aug 2024 05:00) (99% - 100%)    Parameters below as of 02 Aug 2024 05:00  Patient On (Oxygen Delivery Method): room air        LABS:              CAPILLARY BLOOD GLUCOSE      POCT Blood Glucose.: 86 mg/dL (01 Aug 2024 17:21)      Lower Extremity Physical Exam:    Vascular: DP/PT 2/4, B/L, CFT <3 seconds B/L, Temperature gradient  warm to cool, B/L.   Neuro: Epicritic sensation diminished to the level of digits, B/L.  Musculoskeletal/Ortho: Limited ankle ROM, Hammertoe deformity of the right 2nd and 3rd digit  Skin: Right foot second dorsal proximal interphalangeal joint wound to bone with granular wound border, edema improved, erythema resolved, , no purulence, no malodor. Left foot no open wound and no signs of acute infection.     RADIOLOGY & ADDITIONAL TESTS:   Patient is a 88y old  Male who presents with a chief complaint of agitation (01 Aug 2024 12:46)       INTERVAL HPI/OVERNIGHT EVENTS:  Patient seen and evaluated at bedside.  Pt is resting comfortable in NAD. Denies N/V/F/C.      Allergies    No Known Allergies    Intolerances    .MEDICATIONS  (STANDING):  aMIOdarone    Tablet 100 milliGRAM(s) Oral daily  apixaban 2.5 milliGRAM(s) Oral two times a day  aspirin  chewable 81 milliGRAM(s) Oral daily  atorvastatin 20 milliGRAM(s) Oral at bedtime  ceFAZolin   IVPB      ceFAZolin   IVPB 2000 milliGRAM(s) IV Intermittent every 8 hours  divalproex Sprinkle 250 milliGRAM(s) Oral <User Schedule>  melatonin 3 milliGRAM(s) Oral at bedtime  metoprolol tartrate 25 milliGRAM(s) Oral two times a day  mupirocin 2% Ointment 1 Application(s) Topical <User Schedule>  risperiDONE  Disintegrating Tablet 0.5 milliGRAM(s) Oral <User Schedule>  risperiDONE  Disintegrating Tablet 1.5 milliGRAM(s) Oral <User Schedule>        Vital Signs Last 24 Hrs  T(C): 36.8 (02 Aug 2024 05:00), Max: 36.8 (02 Aug 2024 05:00)  T(F): 98.2 (02 Aug 2024 05:00), Max: 98.2 (02 Aug 2024 05:00)  HR: 81 (02 Aug 2024 05:00) (80 - 81)  BP: 116/65 (02 Aug 2024 05:00) (116/65 - 121/66)  BP(mean): --  RR: 17 (02 Aug 2024 05:00) (17 - 18)  SpO2: 99% (02 Aug 2024 05:00) (99% - 100%)    Parameters below as of 02 Aug 2024 05:00  Patient On (Oxygen Delivery Method): room air        LABS:        CAPILLARY BLOOD GLUCOSE  POCT Blood Glucose.: 86 mg/dL (01 Aug 2024 17:21)      Lower Extremity Physical Exam:    Vascular: DP/PT 2/4, B/L, CFT <3 seconds B/L, Temperature gradient  warm to cool, B/L.   Neuro: Epicritic sensation diminished to the level of digits, B/L.  Musculoskeletal/Ortho: Limited ankle ROM, Hammertoe deformity of the right 2nd and 3rd digit  Skin: Right foot second dorsal proximal interphalangeal joint wound to dermis with granular wound border, edema improved, erythema resolved, , no purulence, no malodor/necrosis/fluctuance/ soft tissue crepitus. Left foot no open wound and no signs of acute infection.     RADIOLOGY & ADDITIONAL TESTS:

## 2024-08-02 NOTE — PROGRESS NOTE ADULT - PROBLEM SELECTOR PLAN 9
Eliquis  DNR/DNI  from LTC/NH, needs new facility as per CM; pending LTC when behaviors improve   care d/w wife and daughter bedside 8/1

## 2024-08-02 NOTE — PROGRESS NOTE ADULT - ASSESSMENT
88M with right 2nd digit dorsal wound with cellulitis   - Patient was evaluated and seen  - Afebrile, no leukocytosis   - Right foot second dorsal proximal interphalangeal joint wound to bone with granular wound border, edema improved, erythema resolved, , no purulence, no malodor. Left foot no open wound and no signs of acute infection.   -  Right foot wound culture: MSSA.  - Right foot xray: No OM, no gas  -SHERYL/PVR : RABI and LABI was not accurately assessed due to non-compressible (calcific) vessels. RTBI 0.83, LTBI 0.72  - Right foot MRI: patient unable to tolerate due to agitation  - ID recs, appreciated  - Vascular recs. appreciated   - Podiatry plan: high clinical suspicion for OM due to cellulitis and open PIPJ joint, improving with abx, wound depth decreasing. Patient is unable to tolerate bedside bone biopsy due to dementia. Alteratively would perform athroplasty/ biopsy int he OR however anesthesia is likely to worsen his mental status. Recommend conservative tx per ID with long term abx.   -Betadine dressing applied.  -Strict decub precautions, CAIR boots  - Seen with attending 88M with right 2nd digit dorsal wound with cellulitis   - Patient was evaluated and seen  - Afebrile, no leukocytosis   -  Right foot wound culture: MSSA.  - Right foot xray: No OM, no gas  -SHERYL/PVR : RABI and LABI was not accurately assessed due to non-compressible (calcific) vessels. RTBI 0.83, LTBI 0.72  - Right foot MRI: patient unable to tolerate due to agitation  - ID recs, appreciated -keflex for 6w  - Vascular recs. appreciated no intervention  - Podiatry plan: high clinical suspicion for OM due to cellulitis and open PIPJ joint, improving with abx, wound depth decreasing. Patient is unable to tolerate bedside bone biopsy due to dementia. Alteratively would perform athroplasty/ biopsy int he OR however anesthesia is likely to worsen his mental status. Recommend conservative tx per ID with long term abx.   -Betadine dressing applied.   -Strict decub precautions, CAIR boots  - Discussed with attending

## 2024-08-02 NOTE — PROGRESS NOTE ADULT - PROBLEM SELECTOR PLAN 3
- c/w Eliquis 2.5 mg BID (unclear why renally dosed), amiodarone 100 mg daily, metoprolol 25 mg BID (meds being crushed)  - corrected QTC 8/2 467  - keep K>4 and Mg>2

## 2024-08-03 PROCEDURE — 99232 SBSQ HOSP IP/OBS MODERATE 35: CPT

## 2024-08-03 RX ADMIN — MUPIROCIN CALCIUM 1 APPLICATION(S): 20 CREAM TOPICAL at 09:01

## 2024-08-03 RX ADMIN — Medication 3 MILLIGRAM(S): at 21:47

## 2024-08-03 RX ADMIN — Medication 100 MILLIGRAM(S): at 16:05

## 2024-08-03 RX ADMIN — DIVALPROEX SODIUM 250 MILLIGRAM(S): 125 CAPSULE, COATED PELLETS ORAL at 09:01

## 2024-08-03 RX ADMIN — Medication 81 MILLIGRAM(S): at 12:27

## 2024-08-03 RX ADMIN — RISPERIDONE 0.5 MILLIGRAM(S): 1 TABLET, FILM COATED ORAL at 09:01

## 2024-08-03 RX ADMIN — Medication 25 MILLIGRAM(S): at 17:46

## 2024-08-03 RX ADMIN — Medication 100 MILLIGRAM(S): at 21:54

## 2024-08-03 RX ADMIN — DIVALPROEX SODIUM 250 MILLIGRAM(S): 125 CAPSULE, COATED PELLETS ORAL at 21:46

## 2024-08-03 RX ADMIN — APIXABAN 2.5 MILLIGRAM(S): 5 TABLET, FILM COATED ORAL at 05:20

## 2024-08-03 RX ADMIN — Medication 100 MILLIGRAM(S): at 05:20

## 2024-08-03 RX ADMIN — RISPERIDONE 1.5 MILLIGRAM(S): 1 TABLET, FILM COATED ORAL at 21:46

## 2024-08-03 RX ADMIN — DIVALPROEX SODIUM 250 MILLIGRAM(S): 125 CAPSULE, COATED PELLETS ORAL at 16:05

## 2024-08-03 RX ADMIN — APIXABAN 2.5 MILLIGRAM(S): 5 TABLET, FILM COATED ORAL at 17:45

## 2024-08-03 RX ADMIN — Medication 25 MILLIGRAM(S): at 05:20

## 2024-08-03 RX ADMIN — ATORVASTATIN CALCIUM 20 MILLIGRAM(S): 40 TABLET, FILM COATED ORAL at 21:47

## 2024-08-03 RX ADMIN — AMIODARONE HYDROCHLORIDE 100 MILLIGRAM(S): 50 INJECTION, SOLUTION INTRAVENOUS at 05:19

## 2024-08-03 NOTE — PROGRESS NOTE ADULT - SUBJECTIVE AND OBJECTIVE BOX
Percy Teran MD  Interventional Cardiology / Advance Heart Failure and Cardiac Transplant Specialist  [place] Office : [address] [place] [id number]  Tel: [phone]  [place] Office : [address] [facility] [place] [address]  Tel: [phone]       Pt is lying in bed comfortable not in distress, no chest pains no SOB no palpitations  	  MEDICATIONS:  aMIOdarone    Tablet 100 milliGRAM(s) Oral daily  apixaban 2.5 milliGRAM(s) Oral two times a day  aspirin  chewable 81 milliGRAM(s) Oral daily  metoprolol tartrate 25 milliGRAM(s) Oral two times a day    ceFAZolin   IVPB      ceFAZolin   IVPB 2000 milliGRAM(s) IV Intermittent every 8 hours      acetaminophen     Tablet .. 650 milliGRAM(s) Oral every 6 hours PRN  divalproex Sprinkle 250 milliGRAM(s) Oral <User Schedule>  melatonin 3 milliGRAM(s) Oral at bedtime  OLANZapine Injectable 1.25 milliGRAM(s) IntraMuscular every 6 hours PRN  risperiDONE  Disintegrating Tablet 0.5 milliGRAM(s) Oral <User Schedule>  risperiDONE  Disintegrating Tablet 1.5 milliGRAM(s) Oral <User Schedule>      atorvastatin 20 milliGRAM(s) Oral at bedtime    mupirocin 2% Ointment 1 Application(s) Topical <User Schedule>      PAST MEDICAL/SURGICAL HISTORY  PAST MEDICAL & SURGICAL HISTORY:  Bacterial UTI      Atrial fibrillation      HTN (hypertension)      Dementia          SOCIAL HISTORY: Substance Use (street drugs): ( x ) never used  (  ) other:    FAMILY HISTORY:    PHYSICAL EXAM:  T(C): 36.7 (08-03-24 @ 11:06), Max: 36.8 (08-02-24 @ 20:03)  HR: 80 (08-03-24 @ 11:06) (80 - 98)  BP: 108/59 (08-03-24 @ 11:06) (108/59 - 143/75)  RR: 18 (08-03-24 @ 11:06) (17 - 18)  SpO2: 98% (08-03-24 @ 11:06) (98% - 99%)  Wt(kg): --  I&O's Summary      GENERAL: NAD  EYES:   PERRLA   ENMT:   Moist mucous membranes, Good dentition, No lesions  Cardiovascular: Normal S1 S2, No JVD, No murmurs, No edema  Respiratory: Lungs clear to auscultation	  Gastrointestinal:  Soft, Non-tender, + BS	  Extremities: no edema                                12.8   7.61  )-----------( 221      ( 02 Aug 2024 13:20 )             39.1     08-02    141  |  108<H>  |  22  ----------------------------<  123<H>  4.2   |  23  |  1.19    Ca    9.0      02 Aug 2024 13:20  Mg     2.00     08-02    TPro  6.4  /  Alb  3.2<L>  /  TBili  0.3  /  DBili  x   /  AST  18  /  ALT  <5<L>  /  AlkPhos  67  08-02    proBNP:   Lipid Profile:   HgA1c:   TSH:     Consultant(s) Notes Reviewed:  [x ] YES  [ ] NO    Care Discussed with Consultants/Other Providers [ x] YES  [ ] NO    Imaging Personally Reviewed independently:  [x] YES  [ ] NO    All labs, radiologic studies, vitals, orders and medications list reviewed. Patient is seen and examined at bedside. Case discussed with medical team.

## 2024-08-03 NOTE — PROGRESS NOTE ADULT - SUBJECTIVE AND OBJECTIVE BOX
[facility] Division of Hospital Medicine  Alvarez Diaz DO  Available via MS Teams  In house pager [phone]    SUBJECTIVE / OVERNIGHT EVENTS:  No acute events overnight. Patient seen and examined at bedside this morning.  Says he feels fine, no acute complaints.    ADDITIONAL REVIEW OF SYSTEMS:    MEDICATIONS  (STANDING):  aMIOdarone    Tablet 100 milliGRAM(s) Oral daily  apixaban 2.5 milliGRAM(s) Oral two times a day  aspirin  chewable 81 milliGRAM(s) Oral daily  atorvastatin 20 milliGRAM(s) Oral at bedtime  ceFAZolin   IVPB      ceFAZolin   IVPB 2000 milliGRAM(s) IV Intermittent every 8 hours  divalproex Sprinkle 250 milliGRAM(s) Oral <User Schedule>  melatonin 3 milliGRAM(s) Oral at bedtime  metoprolol tartrate 25 milliGRAM(s) Oral two times a day  mupirocin 2% Ointment 1 Application(s) Topical <User Schedule>  risperiDONE  Disintegrating Tablet 0.5 milliGRAM(s) Oral <User Schedule>  risperiDONE  Disintegrating Tablet 1.5 milliGRAM(s) Oral <User Schedule>    MEDICATIONS  (PRN):  acetaminophen     Tablet .. 650 milliGRAM(s) Oral every 6 hours PRN Temp greater or equal to 38C (100.4F), Mild Pain (1 - 3)  OLANZapine Injectable 1.25 milliGRAM(s) IntraMuscular every 6 hours PRN aggression      I&O's Summary      PHYSICAL EXAM:  Vital Signs Last 24 Hrs  T(C): 36.7 (03 Aug 2024 11:06), Max: 36.8 (02 Aug 2024 20:03)  T(F): 98 (03 Aug 2024 11:06), Max: 98.2 (02 Aug 2024 20:03)  HR: 80 (03 Aug 2024 11:06) (80 - 98)  BP: 108/59 (03 Aug 2024 11:06) (108/59 - 143/75)  BP(mean): --  RR: 18 (03 Aug 2024 11:06) (17 - 18)  SpO2: 98% (03 Aug 2024 11:06) (98% - 99%)    Parameters below as of 03 Aug 2024 11:06  Patient On (Oxygen Delivery Method): room air      GENERAL: NAD   CHEST/LUNG: Clear to auscultation bilaterally; No wheeze  HEART: Regular rate and rhythm; No murmurs, rubs, or gallops  ABDOMEN: Soft, Nontender, Nondistended; Bowel sounds present  EXTREMITIES:   warm and well perfused, No clubbing, cyanosis, or edema  PSYCH: awake, answers most questions, does not know he is in a hospital or what year it is but tells me his name  NEUROLOGY: non-focal  SKIN: R foot dressing c/d/i, scabbing on shins     LABS:                        12.8   7.61  )-----------( 221      ( 02 Aug 2024 13:20 )             39.1     08-02    141  |  108<H>  |  22  ----------------------------<  123<H>  4.2   |  23  |  1.19    Ca    9.0      02 Aug 2024 13:20  Mg     2.00     08-02    TPro  6.4  /  Alb  3.2<L>  /  TBili  0.3  /  DBili  x   /  AST  18  /  ALT  <5<L>  /  AlkPhos  67  08-02          Urinalysis Basic - ( 02 Aug 2024 13:20 )    Color: x / Appearance: x / SG: x / pH: x  Gluc: 123 mg/dL / Ketone: x  / Bili: x / Urobili: x   Blood: x / Protein: x / Nitrite: x   Leuk Esterase: x / RBC: x / WBC x   Sq Epi: x / Non Sq Epi: x / Bacteria: x

## 2024-08-03 NOTE — PROGRESS NOTE ADULT - ASSESSMENT
EKG V Paced QTc 452     Assessment and Plan     1) QT prolongation : 2/2 Paced rhythm corrected QTc around 452ms , ok to continue Antipsychotics     2) Afib :cw metoprolol and eliquis     3) DVT PPX eliquis

## 2024-08-04 PROCEDURE — 99232 SBSQ HOSP IP/OBS MODERATE 35: CPT

## 2024-08-04 RX ADMIN — Medication 25 MILLIGRAM(S): at 05:21

## 2024-08-04 RX ADMIN — DIVALPROEX SODIUM 250 MILLIGRAM(S): 125 CAPSULE, COATED PELLETS ORAL at 17:50

## 2024-08-04 RX ADMIN — AMIODARONE HYDROCHLORIDE 100 MILLIGRAM(S): 50 INJECTION, SOLUTION INTRAVENOUS at 05:22

## 2024-08-04 RX ADMIN — ATORVASTATIN CALCIUM 20 MILLIGRAM(S): 40 TABLET, FILM COATED ORAL at 21:04

## 2024-08-04 RX ADMIN — Medication 3 MILLIGRAM(S): at 21:04

## 2024-08-04 RX ADMIN — Medication 100 MILLIGRAM(S): at 21:04

## 2024-08-04 RX ADMIN — RISPERIDONE 0.5 MILLIGRAM(S): 1 TABLET, FILM COATED ORAL at 13:09

## 2024-08-04 RX ADMIN — APIXABAN 2.5 MILLIGRAM(S): 5 TABLET, FILM COATED ORAL at 17:47

## 2024-08-04 RX ADMIN — Medication 81 MILLIGRAM(S): at 12:43

## 2024-08-04 RX ADMIN — Medication 100 MILLIGRAM(S): at 12:42

## 2024-08-04 RX ADMIN — DIVALPROEX SODIUM 250 MILLIGRAM(S): 125 CAPSULE, COATED PELLETS ORAL at 12:42

## 2024-08-04 RX ADMIN — Medication 100 MILLIGRAM(S): at 05:21

## 2024-08-04 RX ADMIN — DIVALPROEX SODIUM 250 MILLIGRAM(S): 125 CAPSULE, COATED PELLETS ORAL at 21:04

## 2024-08-04 RX ADMIN — APIXABAN 2.5 MILLIGRAM(S): 5 TABLET, FILM COATED ORAL at 05:21

## 2024-08-04 RX ADMIN — MUPIROCIN CALCIUM 1 APPLICATION(S): 20 CREAM TOPICAL at 07:28

## 2024-08-04 RX ADMIN — RISPERIDONE 1.5 MILLIGRAM(S): 1 TABLET, FILM COATED ORAL at 21:03

## 2024-08-04 NOTE — PROGRESS NOTE ADULT - SUBJECTIVE AND OBJECTIVE BOX
[facility] Division of Hospital Medicine  Alvarez Diaz   Available via MS Teams  In house pager [phone]    SUBJECTIVE / OVERNIGHT EVENTS:  No acute events overnight. Patient seen and examined at bedside this morning.  Says he feels okay, doesn't need anything.  Goes back to sleep.    ADDITIONAL REVIEW OF SYSTEMS:    MEDICATIONS  (STANDING):  aMIOdarone    Tablet 100 milliGRAM(s) Oral daily  apixaban 2.5 milliGRAM(s) Oral two times a day  aspirin  chewable 81 milliGRAM(s) Oral daily  atorvastatin 20 milliGRAM(s) Oral at bedtime  ceFAZolin   IVPB      ceFAZolin   IVPB 2000 milliGRAM(s) IV Intermittent every 8 hours  divalproex Sprinkle 250 milliGRAM(s) Oral <User Schedule>  melatonin 3 milliGRAM(s) Oral at bedtime  metoprolol tartrate 25 milliGRAM(s) Oral two times a day  mupirocin 2% Ointment 1 Application(s) Topical <User Schedule>  risperiDONE  Disintegrating Tablet 0.5 milliGRAM(s) Oral <User Schedule>  risperiDONE  Disintegrating Tablet 1.5 milliGRAM(s) Oral <User Schedule>    MEDICATIONS  (PRN):  acetaminophen     Tablet .. 650 milliGRAM(s) Oral every 6 hours PRN Temp greater or equal to 38C (100.4F), Mild Pain (1 - 3)  OLANZapine Injectable 1.25 milliGRAM(s) IntraMuscular every 6 hours PRN aggression      I&O's Summary      PHYSICAL EXAM:  Vital Signs Last 24 Hrs  T(C): 37.1 (04 Aug 2024 11:11), Max: 37.1 (04 Aug 2024 11:11)  T(F): 98.7 (04 Aug 2024 11:11), Max: 98.7 (04 Aug 2024 11:11)  HR: 79 (04 Aug 2024 11:11) (79 - 85)  BP: 98/55 (04 Aug 2024 11:11) (98/55 - 131/67)  BP(mean): --  RR: 18 (04 Aug 2024 11:11) (17 - 18)  SpO2: 98% (04 Aug 2024 11:11) (97% - 100%)    Parameters below as of 04 Aug 2024 11:11  Patient On (Oxygen Delivery Method): room air      GENERAL: NAD   CHEST/LUNG: Clear to auscultation bilaterally; No wheeze  HEART: Regular rate and rhythm; No murmurs, rubs, or gallops  ABDOMEN: Soft, Nontender, Nondistended; Bowel sounds present  EXTREMITIES:   warm and well perfused, No clubbing, cyanosis, or edema  PSYCH: awake, answers most questions, does not know he is in a hospital or what year it is but tells me his name  NEUROLOGY: non-focal  SKIN: R foot dressing c/d/i, scabbing on shins       LABS:                        12.8   7.61  )-----------( 221      ( 02 Aug 2024 13:20 )             39.1     08-02    141  |  108<H>  |  22  ----------------------------<  123<H>  4.2   |  23  |  1.19    Ca    9.0      02 Aug 2024 13:20  Mg     2.00     08-02    TPro  6.4  /  Alb  3.2<L>  /  TBili  0.3  /  DBili  x   /  AST  18  /  ALT  <5<L>  /  AlkPhos  67  08-02          Urinalysis Basic - ( 02 Aug 2024 13:20 )    Color: x / Appearance: x / SG: x / pH: x  Gluc: 123 mg/dL / Ketone: x  / Bili: x / Urobili: x   Blood: x / Protein: x / Nitrite: x   Leuk Esterase: x / RBC: x / WBC x   Sq Epi: x / Non Sq Epi: x / Bacteria: x

## 2024-08-04 NOTE — PROGRESS NOTE ADULT - SUBJECTIVE AND OBJECTIVE BOX
Percy Teran MD  Interventional Cardiology / Advance Heart Failure and Cardiac Transplant Specialist  [place] Office : [address] [place] [address]  Tel: [phone]  [place] Office : [address] [facility] [place] [address]  Tel: [phone]       Pt is lying in bed comfortable not in distress, no chest pains no SOB no palpitations  	  MEDICATIONS:  aMIOdarone    Tablet 100 milliGRAM(s) Oral daily  apixaban 2.5 milliGRAM(s) Oral two times a day  aspirin  chewable 81 milliGRAM(s) Oral daily  metoprolol tartrate 25 milliGRAM(s) Oral two times a day    ceFAZolin   IVPB 2000 milliGRAM(s) IV Intermittent every 8 hours  ceFAZolin   IVPB          acetaminophen     Tablet .. 650 milliGRAM(s) Oral every 6 hours PRN  divalproex Sprinkle 250 milliGRAM(s) Oral <User Schedule>  melatonin 3 milliGRAM(s) Oral at bedtime  OLANZapine Injectable 1.25 milliGRAM(s) IntraMuscular every 6 hours PRN  risperiDONE  Disintegrating Tablet 0.5 milliGRAM(s) Oral <User Schedule>  risperiDONE  Disintegrating Tablet 1.5 milliGRAM(s) Oral <User Schedule>      atorvastatin 20 milliGRAM(s) Oral at bedtime    mupirocin 2% Ointment 1 Application(s) Topical <User Schedule>      PAST MEDICAL/SURGICAL HISTORY  PAST MEDICAL & SURGICAL HISTORY:  Bacterial UTI      Atrial fibrillation      HTN (hypertension)      Dementia          SOCIAL HISTORY: Substance Use (street drugs): ( x ) never used  (  ) other:    FAMILY HISTORY:    PHYSICAL EXAM:  T(C): 37.1 (08-04-24 @ 11:11), Max: 37.1 (08-04-24 @ 11:11)  HR: 79 (08-04-24 @ 11:11) (79 - 85)  BP: 98/55 (08-04-24 @ 11:11) (98/55 - 131/67)  RR: 18 (08-04-24 @ 11:11) (17 - 18)  SpO2: 98% (08-04-24 @ 11:11) (97% - 100%)  Wt(kg): --  I&O's Summary        GENERAL: NAD  EYES:   PERRLA   ENMT:   Moist mucous membranes, Good dentition, No lesions  Cardiovascular: Normal S1 S2, No JVD, No murmurs, No edema  Respiratory: Lungs clear to auscultation	  Gastrointestinal:  Soft, Non-tender, + BS	  Extremities: no edema                                12.8   7.61  )-----------( 221      ( 02 Aug 2024 13:20 )             39.1     08-02    141  |  108<H>  |  22  ----------------------------<  123<H>  4.2   |  23  |  1.19    Ca    9.0      02 Aug 2024 13:20  Mg     2.00     08-02    TPro  6.4  /  Alb  3.2<L>  /  TBili  0.3  /  DBili  x   /  AST  18  /  ALT  <5<L>  /  AlkPhos  67  08-02    proBNP:   Lipid Profile:   HgA1c:   TSH:     Consultant(s) Notes Reviewed:  [x ] YES  [ ] NO    Care Discussed with Consultants/Other Providers [ x] YES  [ ] NO    Imaging Personally Reviewed independently:  [x] YES  [ ] NO    All labs, radiologic studies, vitals, orders and medications list reviewed. Patient is seen and examined at bedside. Case discussed with medical team.

## 2024-08-05 LAB
ALBUMIN SERPL ELPH-MCNC: 3 G/DL — LOW (ref 3.3–5)
ALP SERPL-CCNC: 72 U/L — SIGNIFICANT CHANGE UP (ref 40–120)
ALT FLD-CCNC: <5 U/L — LOW (ref 4–41)
AMMONIA BLD-MCNC: 21 UMOL/L — SIGNIFICANT CHANGE UP (ref 11–55)
ANION GAP SERPL CALC-SCNC: 11 MMOL/L — SIGNIFICANT CHANGE UP (ref 7–14)
AST SERPL-CCNC: 19 U/L — SIGNIFICANT CHANGE UP (ref 4–40)
BILIRUB SERPL-MCNC: 0.2 MG/DL — SIGNIFICANT CHANGE UP (ref 0.2–1.2)
BUN SERPL-MCNC: 32 MG/DL — HIGH (ref 7–23)
CALCIUM SERPL-MCNC: 9.2 MG/DL — SIGNIFICANT CHANGE UP (ref 8.4–10.5)
CHLORIDE SERPL-SCNC: 106 MMOL/L — SIGNIFICANT CHANGE UP (ref 98–107)
CO2 SERPL-SCNC: 23 MMOL/L — SIGNIFICANT CHANGE UP (ref 22–31)
CREAT SERPL-MCNC: 1.16 MG/DL — SIGNIFICANT CHANGE UP (ref 0.5–1.3)
EGFR: 61 ML/MIN/1.73M2 — SIGNIFICANT CHANGE UP
GLUCOSE SERPL-MCNC: 84 MG/DL — SIGNIFICANT CHANGE UP (ref 70–99)
HCT VFR BLD CALC: 39.1 % — SIGNIFICANT CHANGE UP (ref 39–50)
HGB BLD-MCNC: 13 G/DL — SIGNIFICANT CHANGE UP (ref 13–17)
MCHC RBC-ENTMCNC: 30.2 PG — SIGNIFICANT CHANGE UP (ref 27–34)
MCHC RBC-ENTMCNC: 33.2 GM/DL — SIGNIFICANT CHANGE UP (ref 32–36)
MCV RBC AUTO: 90.7 FL — SIGNIFICANT CHANGE UP (ref 80–100)
NRBC # BLD: 0 /100 WBCS — SIGNIFICANT CHANGE UP (ref 0–0)
NRBC # FLD: 0 K/UL — SIGNIFICANT CHANGE UP (ref 0–0)
PLATELET # BLD AUTO: 234 K/UL — SIGNIFICANT CHANGE UP (ref 150–400)
POTASSIUM SERPL-MCNC: 5 MMOL/L — SIGNIFICANT CHANGE UP (ref 3.5–5.3)
POTASSIUM SERPL-SCNC: 5 MMOL/L — SIGNIFICANT CHANGE UP (ref 3.5–5.3)
PROT SERPL-MCNC: 6 G/DL — SIGNIFICANT CHANGE UP (ref 6–8.3)
RBC # BLD: 4.31 M/UL — SIGNIFICANT CHANGE UP (ref 4.2–5.8)
RBC # FLD: 14 % — SIGNIFICANT CHANGE UP (ref 10.3–14.5)
SODIUM SERPL-SCNC: 140 MMOL/L — SIGNIFICANT CHANGE UP (ref 135–145)
WBC # BLD: 7.44 K/UL — SIGNIFICANT CHANGE UP (ref 3.8–10.5)
WBC # FLD AUTO: 7.44 K/UL — SIGNIFICANT CHANGE UP (ref 3.8–10.5)

## 2024-08-05 PROCEDURE — 99232 SBSQ HOSP IP/OBS MODERATE 35: CPT

## 2024-08-05 RX ORDER — RISPERIDONE 1 MG/1
1.5 TABLET, FILM COATED ORAL
Qty: 30 | Refills: 0
Start: 2024-08-05

## 2024-08-05 RX ORDER — DIVALPROEX SODIUM 125 MG/1
0 CAPSULE, COATED PELLETS ORAL
Qty: 0 | Refills: 0 | DISCHARGE
Start: 2024-08-05

## 2024-08-05 RX ORDER — ASPIRIN 500 MG
1 TABLET ORAL
Qty: 0 | Refills: 0 | DISCHARGE
Start: 2024-08-05

## 2024-08-05 RX ORDER — ATORVASTATIN CALCIUM 40 MG/1
1 TABLET, FILM COATED ORAL
Refills: 0 | DISCHARGE

## 2024-08-05 RX ORDER — APIXABAN 5 MG/1
1 TABLET, FILM COATED ORAL
Refills: 0 | DISCHARGE

## 2024-08-05 RX ORDER — ALPRAZOLAM 0.5 MG
1 TABLET ORAL
Refills: 0 | DISCHARGE

## 2024-08-05 RX ORDER — ACETAMINOPHEN 500 MG
2 TABLET ORAL
Qty: 0 | Refills: 0 | DISCHARGE
Start: 2024-08-05

## 2024-08-05 RX ORDER — APIXABAN 5 MG/1
1 TABLET, FILM COATED ORAL
Qty: 0 | Refills: 0 | DISCHARGE
Start: 2024-08-05

## 2024-08-05 RX ORDER — ASPIRIN 500 MG
0 TABLET ORAL
Refills: 0 | DISCHARGE

## 2024-08-05 RX ORDER — AMIODARONE HYDROCHLORIDE 50 MG/ML
1 INJECTION, SOLUTION INTRAVENOUS
Refills: 0 | DISCHARGE

## 2024-08-05 RX ORDER — CEPHALEXIN 250 MG
1 CAPSULE ORAL
Qty: 140 | Refills: 0
Start: 2024-08-05 | End: 2024-09-08

## 2024-08-05 RX ORDER — MELATONIN 3 MG
1 TABLET ORAL
Qty: 0 | Refills: 0 | DISCHARGE
Start: 2024-08-05

## 2024-08-05 RX ORDER — MIDODRINE HYDROCHLORIDE 2.5 MG/1
1 TABLET ORAL
Refills: 0 | DISCHARGE

## 2024-08-05 RX ORDER — CEPHALEXIN 250 MG
1 CAPSULE ORAL
Qty: 120 | Refills: 0
Start: 2024-08-05 | End: 2024-09-03

## 2024-08-05 RX ORDER — MELATONIN 3 MG
1 TABLET ORAL
Refills: 0 | DISCHARGE

## 2024-08-05 RX ORDER — RISPERIDONE 1 MG/1
1 TABLET, FILM COATED ORAL
Qty: 0 | Refills: 0 | DISCHARGE
Start: 2024-08-05

## 2024-08-05 RX ORDER — ATORVASTATIN CALCIUM 40 MG/1
1 TABLET, FILM COATED ORAL
Qty: 0 | Refills: 0 | DISCHARGE
Start: 2024-08-05

## 2024-08-05 RX ORDER — AMIODARONE HYDROCHLORIDE 50 MG/ML
0.5 INJECTION, SOLUTION INTRAVENOUS
Qty: 0 | Refills: 0 | DISCHARGE
Start: 2024-08-05

## 2024-08-05 RX ADMIN — DIVALPROEX SODIUM 250 MILLIGRAM(S): 125 CAPSULE, COATED PELLETS ORAL at 20:09

## 2024-08-05 RX ADMIN — AMIODARONE HYDROCHLORIDE 100 MILLIGRAM(S): 50 INJECTION, SOLUTION INTRAVENOUS at 06:04

## 2024-08-05 RX ADMIN — DIVALPROEX SODIUM 250 MILLIGRAM(S): 125 CAPSULE, COATED PELLETS ORAL at 08:58

## 2024-08-05 RX ADMIN — APIXABAN 2.5 MILLIGRAM(S): 5 TABLET, FILM COATED ORAL at 18:25

## 2024-08-05 RX ADMIN — DIVALPROEX SODIUM 250 MILLIGRAM(S): 125 CAPSULE, COATED PELLETS ORAL at 13:10

## 2024-08-05 RX ADMIN — Medication 3 MILLIGRAM(S): at 20:10

## 2024-08-05 RX ADMIN — Medication 81 MILLIGRAM(S): at 13:33

## 2024-08-05 RX ADMIN — RISPERIDONE 1.5 MILLIGRAM(S): 1 TABLET, FILM COATED ORAL at 20:10

## 2024-08-05 RX ADMIN — APIXABAN 2.5 MILLIGRAM(S): 5 TABLET, FILM COATED ORAL at 06:04

## 2024-08-05 RX ADMIN — Medication 100 MILLIGRAM(S): at 15:52

## 2024-08-05 RX ADMIN — Medication 100 MILLIGRAM(S): at 22:28

## 2024-08-05 RX ADMIN — ATORVASTATIN CALCIUM 20 MILLIGRAM(S): 40 TABLET, FILM COATED ORAL at 20:10

## 2024-08-05 RX ADMIN — MUPIROCIN CALCIUM 1 APPLICATION(S): 20 CREAM TOPICAL at 08:58

## 2024-08-05 RX ADMIN — Medication 25 MILLIGRAM(S): at 18:27

## 2024-08-05 RX ADMIN — Medication 25 MILLIGRAM(S): at 06:04

## 2024-08-05 RX ADMIN — Medication 100 MILLIGRAM(S): at 06:12

## 2024-08-05 RX ADMIN — RISPERIDONE 0.5 MILLIGRAM(S): 1 TABLET, FILM COATED ORAL at 08:57

## 2024-08-05 NOTE — BH CONSULTATION LIAISON PROGRESS NOTE - NSBHCHARTREVIEWVS_PSY_A_CORE FT
Vital Signs Last 24 Hrs  T(C): 36.4 (05 Aug 2024 11:01), Max: 36.6 (04 Aug 2024 20:39)  T(F): 97.5 (05 Aug 2024 11:01), Max: 97.8 (04 Aug 2024 20:39)  HR: 80 (05 Aug 2024 11:01) (75 - 81)  BP: 109/54 (05 Aug 2024 11:01) (99/60 - 131/64)  BP(mean): --  RR: 18 (05 Aug 2024 11:01) (18 - 18)  SpO2: 97% (05 Aug 2024 11:01) (97% - 98%)    Parameters below as of 05 Aug 2024 11:01  Patient On (Oxygen Delivery Method): room air    
Vital Signs Last 24 Hrs  T(C): 36.4 (24 Jul 2024 11:50), Max: 36.6 (23 Jul 2024 18:19)  T(F): 97.5 (24 Jul 2024 11:50), Max: 97.9 (23 Jul 2024 18:19)  HR: 80 (24 Jul 2024 11:50) (80 - 102)  BP: 142/79 (24 Jul 2024 11:50) (142/79 - 154/86)  BP(mean): --  RR: 18 (24 Jul 2024 11:50) (17 - 18)  SpO2: 100% (24 Jul 2024 11:50) (98% - 100%)    Parameters below as of 24 Jul 2024 11:50  Patient On (Oxygen Delivery Method): room air    
Vital Signs Last 24 Hrs  T(C): 36.2 (30 Jul 2024 11:04), Max: 36.7 (30 Jul 2024 05:06)  T(F): 97.1 (30 Jul 2024 11:04), Max: 98 (30 Jul 2024 05:06)  HR: 80 (30 Jul 2024 11:04) (80 - 92)  BP: 128/67 (30 Jul 2024 11:04) (112/66 - 133/83)  BP(mean): --  RR: 18 (30 Jul 2024 11:04) (18 - 18)  SpO2: 97% (30 Jul 2024 11:04) (97% - 98%)    Parameters below as of 30 Jul 2024 11:04  Patient On (Oxygen Delivery Method): room air    
Vital Signs Last 24 Hrs  T(C): 36.2 (25 Jul 2024 11:18), Max: 36.3 (24 Jul 2024 21:00)  T(F): 97.2 (25 Jul 2024 11:18), Max: 97.3 (24 Jul 2024 21:00)  HR: 80 (25 Jul 2024 11:18) (80 - 106)  BP: 135/88 (25 Jul 2024 11:18) (127/77 - 149/84)  BP(mean): --  RR: 18 (25 Jul 2024 11:18) (18 - 18)  SpO2: 99% (25 Jul 2024 11:18) (98% - 99%)    Parameters below as of 25 Jul 2024 11:18  Patient On (Oxygen Delivery Method): room air    
Vital Signs Last 24 Hrs  T(C): 36.6 (28 Jul 2024 05:00), Max: 36.7 (27 Jul 2024 16:23)  T(F): 97.8 (28 Jul 2024 05:00), Max: 98.1 (27 Jul 2024 16:23)  HR: 79 (28 Jul 2024 05:00) (72 - 82)  BP: 177/91 (28 Jul 2024 05:00) (138/86 - 177/91)  BP(mean): --  RR: 18 (28 Jul 2024 05:00) (18 - 18)  SpO2: 100% (28 Jul 2024 05:00) (99% - 100%)    Parameters below as of 28 Jul 2024 05:00  Patient On (Oxygen Delivery Method): room air    
Vital Signs Last 24 Hrs  T(C): 36.4 (02 Aug 2024 11:15), Max: 36.8 (02 Aug 2024 05:00)  T(F): 97.5 (02 Aug 2024 11:15), Max: 98.2 (02 Aug 2024 05:00)  HR: 75 (02 Aug 2024 11:15) (75 - 81)  BP: 144/79 (02 Aug 2024 11:15) (116/65 - 144/79)  BP(mean): --  RR: 17 (02 Aug 2024 11:15) (17 - 17)  SpO2: 100% (02 Aug 2024 11:15) (99% - 100%)    Parameters below as of 02 Aug 2024 11:15  Patient On (Oxygen Delivery Method): room air    
Vital Signs Last 24 Hrs  T(C): 36.8 (29 Jul 2024 11:42), Max: 36.9 (29 Jul 2024 05:05)  T(F): 98.2 (29 Jul 2024 11:42), Max: 98.5 (29 Jul 2024 05:05)  HR: 91 (29 Jul 2024 11:42) (75 - 91)  BP: 137/83 (29 Jul 2024 11:42) (137/83 - 155/92)  BP(mean): --  RR: 18 (29 Jul 2024 11:42) (18 - 18)  SpO2: 98% (29 Jul 2024 11:42) (98% - 100%)    Parameters below as of 29 Jul 2024 11:42  Patient On (Oxygen Delivery Method): room air    
Vital Signs Last 24 Hrs  T(C): 36.4 (26 Jul 2024 11:18), Max: 36.4 (26 Jul 2024 05:00)  T(F): 97.5 (26 Jul 2024 11:18), Max: 97.6 (26 Jul 2024 05:00)  HR: 90 (26 Jul 2024 11:18) (80 - 112)  BP: 161/99 (26 Jul 2024 11:18) (146/91 - 161/99)  BP(mean): --  RR: 18 (26 Jul 2024 11:18) (17 - 18)  SpO2: 97% (26 Jul 2024 11:18) (97% - 99%)    Parameters below as of 26 Jul 2024 11:18  Patient On (Oxygen Delivery Method): room air    
Vital Signs Last 24 Hrs  T(C): 36.2 (31 Jul 2024 11:08), Max: 36.7 (31 Jul 2024 05:13)  T(F): 97.1 (31 Jul 2024 11:08), Max: 98 (31 Jul 2024 05:13)  HR: 81 (31 Jul 2024 11:08) (81 - 85)  BP: 125/79 (31 Jul 2024 11:08) (118/62 - 134/71)  BP(mean): --  RR: 18 (31 Jul 2024 11:08) (17 - 18)  SpO2: 97% (31 Jul 2024 11:08) (97% - 99%)    Parameters below as of 31 Jul 2024 11:08  Patient On (Oxygen Delivery Method): room air

## 2024-08-05 NOTE — BH CONSULTATION LIAISON PROGRESS NOTE - NS ED BHA REVIEW OF ED CHART AVAILABLE LABS REVIEWED
None available
None available
Yes
None available
None available
Yes
None available
Yes
None available

## 2024-08-05 NOTE — PROGRESS NOTE ADULT - PROBLEM SELECTOR PLAN 2
Acute encephalopathy superimposed on chronic dementia,   - CTH: Anterior communicating artery aneurysm coil embolization. No acute intracranial hemorrhage. 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 Advanced chronic microvascular ischemic changes.  - psych following, risperidone 0.5/1.5 mg (increased pm dose 8/2) + Depakote 250 mg TID    - no PRNs needed since 8/2

## 2024-08-05 NOTE — BH CONSULTATION LIAISON PROGRESS NOTE - NSBHFUPINTERVALCCFT_PSY_A_CORE
"feeling wonderful"
I'm okay"
"feeling good"
"feeling good"
mumbling
I'm happy to see you"
"Hello doc"
mumbling
"feeling good"

## 2024-08-05 NOTE — BH CONSULTATION LIAISON PROGRESS NOTE - LEVEL OF CONSCIOUSNESS
Lethargic, arousable to verbal stimulus
Alert
Lethargic, arousable to verbal stimulus
Lethargic, arousable to verbal stimulus
Alert

## 2024-08-05 NOTE — BH CONSULTATION LIAISON PROGRESS NOTE - NSBHTIMEACTIVITIESPERFORMED_PSY_A_CORE
discussed with pt, primary team, nursing, IDR's, prior MD  time exclusive of supervision/education
chart reviewed, coordinated with medicine, [address] [place] [address] team, family

## 2024-08-05 NOTE — BH CONSULTATION LIAISON PROGRESS NOTE - NSBHCONSULTPRIMARYDISCUSSYES_PSY_A_CORE FT
Discussed during interdisciplinary rounds

## 2024-08-05 NOTE — BH CONSULTATION LIAISON PROGRESS NOTE - NSBHPTASSESSDT_PSY_A_CORE
24-Jul-2024 14:24
05-Aug-2024 16:27
29-Jul-2024 14:15
31-Jul-2024 13:53
28-Jul-2024 11:30
02-Aug-2024 16:05
25-Jul-2024 12:34
30-Jul-2024 14:04
26-Jul-2024 12:05

## 2024-08-05 NOTE — BH CONSULTATION LIAISON PROGRESS NOTE - NSBHPSYCHOLCOGABN_PSY_A_CORE
disoriented to time/disoriented to place/disoriented to situation

## 2024-08-05 NOTE — BH CONSULTATION LIAISON PROGRESS NOTE - CURRENT MEDICATION
MEDICATIONS  (STANDING):  aMIOdarone    Tablet 100 milliGRAM(s) Oral daily  apixaban 2.5 milliGRAM(s) Oral two times a day  aspirin  chewable 81 milliGRAM(s) Oral daily  atorvastatin 20 milliGRAM(s) Oral at bedtime  divalproex  milliGRAM(s) Oral every 8 hours  melatonin 3 milliGRAM(s) Oral at bedtime  metoprolol succinate ER 25 milliGRAM(s) Oral daily  midodrine. 10 milliGRAM(s) Oral <User Schedule>  sodium chloride 0.9%. 1000 milliLiter(s) (75 mL/Hr) IV Continuous <Continuous>  thiamine IVPB 500 milliGRAM(s) IV Intermittent daily    MEDICATIONS  (PRN):  acetaminophen     Tablet .. 650 milliGRAM(s) Oral every 6 hours PRN Temp greater or equal to 38C (100.4F), Mild Pain (1 - 3)  ALPRAZolam 1 milliGRAM(s) Oral two times a day PRN for anxiety  
MEDICATIONS  (STANDING):  aMIOdarone    Tablet 100 milliGRAM(s) Oral daily  apixaban 2.5 milliGRAM(s) Oral two times a day  aspirin  chewable 81 milliGRAM(s) Oral daily  atorvastatin 20 milliGRAM(s) Oral at bedtime  ceFAZolin   IVPB 2000 milliGRAM(s) IV Intermittent every 8 hours  ceFAZolin   IVPB      divalproex Sprinkle 250 milliGRAM(s) Oral every 8 hours  melatonin 3 milliGRAM(s) Oral at bedtime  metoprolol succinate ER 25 milliGRAM(s) Oral daily  mupirocin 2% Ointment 1 Application(s) Topical <User Schedule>  risperiDONE  Disintegrating Tablet 0.5 milliGRAM(s) Oral <User Schedule>  risperiDONE  Disintegrating Tablet 1 milliGRAM(s) Oral <User Schedule>    MEDICATIONS  (PRN):  acetaminophen     Tablet .. 650 milliGRAM(s) Oral every 6 hours PRN Temp greater or equal to 38C (100.4F), Mild Pain (1 - 3)  OLANZapine Injectable 1.25 milliGRAM(s) IntraMuscular every 6 hours PRN aggression  
MEDICATIONS  (STANDING):  aMIOdarone    Tablet 100 milliGRAM(s) Oral daily  apixaban 2.5 milliGRAM(s) Oral two times a day  aspirin  chewable 81 milliGRAM(s) Oral daily  atorvastatin 20 milliGRAM(s) Oral at bedtime  divalproex  milliGRAM(s) Oral every 8 hours  melatonin 3 milliGRAM(s) Oral at bedtime  metoprolol succinate ER 25 milliGRAM(s) Oral daily  midodrine. 10 milliGRAM(s) Oral <User Schedule>  mupirocin 2% Ointment 1 Application(s) Topical <User Schedule>  risperiDONE   Tablet 0.25 milliGRAM(s) Oral <User Schedule>  risperiDONE   Tablet 0.5 milliGRAM(s) Oral <User Schedule>  vancomycin  IVPB 1250 milliGRAM(s) IV Intermittent every 24 hours    MEDICATIONS  (PRN):  acetaminophen     Tablet .. 650 milliGRAM(s) Oral every 6 hours PRN Temp greater or equal to 38C (100.4F), Mild Pain (1 - 3)  OLANZapine Injectable 1.25 milliGRAM(s) IntraMuscular every 6 hours PRN aggression  
MEDICATIONS  (STANDING):  aMIOdarone    Tablet 100 milliGRAM(s) Oral daily  apixaban 2.5 milliGRAM(s) Oral two times a day  aspirin  chewable 81 milliGRAM(s) Oral daily  atorvastatin 20 milliGRAM(s) Oral at bedtime  divalproex  milliGRAM(s) Oral every 8 hours  melatonin 3 milliGRAM(s) Oral at bedtime  metoprolol succinate ER 25 milliGRAM(s) Oral daily  mupirocin 2% Ointment 1 Application(s) Topical <User Schedule>  risperiDONE   Tablet 0.75 milliGRAM(s) Oral <User Schedule>  vancomycin  IVPB 1250 milliGRAM(s) IV Intermittent every 24 hours    MEDICATIONS  (PRN):  acetaminophen     Tablet .. 650 milliGRAM(s) Oral every 6 hours PRN Temp greater or equal to 38C (100.4F), Mild Pain (1 - 3)  OLANZapine Injectable 1.25 milliGRAM(s) IntraMuscular every 6 hours PRN aggression  
MEDICATIONS  (STANDING):  aMIOdarone    Tablet 100 milliGRAM(s) Oral daily  apixaban 2.5 milliGRAM(s) Oral two times a day  aspirin  chewable 81 milliGRAM(s) Oral daily  atorvastatin 20 milliGRAM(s) Oral at bedtime  ceFAZolin   IVPB      ceFAZolin   IVPB 2000 milliGRAM(s) IV Intermittent every 8 hours  divalproex Sprinkle 250 milliGRAM(s) Oral <User Schedule>  melatonin 3 milliGRAM(s) Oral at bedtime  metoprolol tartrate 25 milliGRAM(s) Oral two times a day  mupirocin 2% Ointment 1 Application(s) Topical <User Schedule>  risperiDONE  Disintegrating Tablet 1.5 milliGRAM(s) Oral <User Schedule>  risperiDONE  Disintegrating Tablet 0.5 milliGRAM(s) Oral <User Schedule>    MEDICATIONS  (PRN):  acetaminophen     Tablet .. 650 milliGRAM(s) Oral every 6 hours PRN Temp greater or equal to 38C (100.4F), Mild Pain (1 - 3)  OLANZapine Injectable 1.25 milliGRAM(s) IntraMuscular every 6 hours PRN aggression  
MEDICATIONS  (STANDING):  aMIOdarone    Tablet 100 milliGRAM(s) Oral daily  apixaban 2.5 milliGRAM(s) Oral two times a day  aspirin  chewable 81 milliGRAM(s) Oral daily  atorvastatin 20 milliGRAM(s) Oral at bedtime  ceFAZolin   IVPB      ceFAZolin   IVPB 2000 milliGRAM(s) IV Intermittent every 8 hours  divalproex Sprinkle 250 milliGRAM(s) Oral every 8 hours  melatonin 3 milliGRAM(s) Oral at bedtime  metoprolol succinate ER 25 milliGRAM(s) Oral daily  mupirocin 2% Ointment 1 Application(s) Topical <User Schedule>  risperiDONE  Disintegrating Tablet 0.5 milliGRAM(s) Oral <User Schedule>  risperiDONE  Disintegrating Tablet 1 milliGRAM(s) Oral <User Schedule>    MEDICATIONS  (PRN):  acetaminophen     Tablet .. 650 milliGRAM(s) Oral every 6 hours PRN Temp greater or equal to 38C (100.4F), Mild Pain (1 - 3)  OLANZapine Injectable 1.25 milliGRAM(s) IntraMuscular every 6 hours PRN aggression  
MEDICATIONS  (STANDING):  aMIOdarone    Tablet 100 milliGRAM(s) Oral daily  apixaban 2.5 milliGRAM(s) Oral two times a day  aspirin  chewable 81 milliGRAM(s) Oral daily  atorvastatin 20 milliGRAM(s) Oral at bedtime  ceFAZolin   IVPB      ceFAZolin   IVPB 2000 milliGRAM(s) IV Intermittent every 8 hours  divalproex Sprinkle 250 milliGRAM(s) Oral <User Schedule>  melatonin 3 milliGRAM(s) Oral at bedtime  metoprolol tartrate 25 milliGRAM(s) Oral two times a day  mupirocin 2% Ointment 1 Application(s) Topical <User Schedule>  risperiDONE  Disintegrating Tablet 1.5 milliGRAM(s) Oral <User Schedule>  risperiDONE  Disintegrating Tablet 0.5 milliGRAM(s) Oral <User Schedule>    MEDICATIONS  (PRN):  acetaminophen     Tablet .. 650 milliGRAM(s) Oral every 6 hours PRN Temp greater or equal to 38C (100.4F), Mild Pain (1 - 3)  OLANZapine Injectable 1.25 milliGRAM(s) IntraMuscular every 6 hours PRN aggression  
MEDICATIONS  (STANDING):  aMIOdarone    Tablet 100 milliGRAM(s) Oral daily  apixaban 2.5 milliGRAM(s) Oral two times a day  aspirin  chewable 81 milliGRAM(s) Oral daily  atorvastatin 20 milliGRAM(s) Oral at bedtime  divalproex  milliGRAM(s) Oral every 8 hours  melatonin 3 milliGRAM(s) Oral at bedtime  metoprolol succinate ER 25 milliGRAM(s) Oral daily  midodrine. 10 milliGRAM(s) Oral <User Schedule>  mupirocin 2% Ointment 1 Application(s) Topical <User Schedule>  sodium chloride 0.9%. 1000 milliLiter(s) (75 mL/Hr) IV Continuous <Continuous>  thiamine IVPB 500 milliGRAM(s) IV Intermittent daily    MEDICATIONS  (PRN):  acetaminophen     Tablet .. 650 milliGRAM(s) Oral every 6 hours PRN Temp greater or equal to 38C (100.4F), Mild Pain (1 - 3)  ALPRAZolam 1 milliGRAM(s) Oral two times a day PRN for anxiety  
MEDICATIONS  (STANDING):  aMIOdarone    Tablet 100 milliGRAM(s) Oral daily  apixaban 2.5 milliGRAM(s) Oral two times a day  aspirin  chewable 81 milliGRAM(s) Oral daily  atorvastatin 20 milliGRAM(s) Oral at bedtime  divalproex  milliGRAM(s) Oral every 8 hours  melatonin 3 milliGRAM(s) Oral at bedtime  metoprolol succinate ER 25 milliGRAM(s) Oral daily  midodrine. 10 milliGRAM(s) Oral <User Schedule>  sodium chloride 0.9%. 1000 milliLiter(s) (75 mL/Hr) IV Continuous <Continuous>  thiamine IVPB 500 milliGRAM(s) IV Intermittent daily    MEDICATIONS  (PRN):  acetaminophen     Tablet .. 650 milliGRAM(s) Oral every 6 hours PRN Temp greater or equal to 38C (100.4F), Mild Pain (1 - 3)  ALPRAZolam 1 milliGRAM(s) Oral two times a day PRN for anxiety

## 2024-08-05 NOTE — PROGRESS NOTE ADULT - SUBJECTIVE AND OBJECTIVE BOX
Percy Teran MD  Interventional Cardiology / Endovascular Specialist  [place] Office : [address] [place] [address] [address]  Tel:   [place] Office : [address] [facility] [place] [address]  Tel: [phone]      Pt is lying in bed comfortable not in distress, no chest pains no SOB no palpitations	      MEDICATIONS:  aMIOdarone    Tablet 100 milliGRAM(s) Oral daily  apixaban 2.5 milliGRAM(s) Oral two times a day  aspirin  chewable 81 milliGRAM(s) Oral daily  metoprolol tartrate 25 milliGRAM(s) Oral two times a day    ceFAZolin   IVPB 2000 milliGRAM(s) IV Intermittent every 8 hours  ceFAZolin   IVPB          acetaminophen     Tablet .. 650 milliGRAM(s) Oral every 6 hours PRN  divalproex Sprinkle 250 milliGRAM(s) Oral <User Schedule>  melatonin 3 milliGRAM(s) Oral at bedtime  OLANZapine Injectable 1.25 milliGRAM(s) IntraMuscular every 6 hours PRN  risperiDONE  Disintegrating Tablet 0.5 milliGRAM(s) Oral <User Schedule>  risperiDONE  Disintegrating Tablet 1.5 milliGRAM(s) Oral <User Schedule>      atorvastatin 20 milliGRAM(s) Oral at bedtime    mupirocin 2% Ointment 1 Application(s) Topical <User Schedule>      PAST MEDICAL/SURGICAL HISTORY  PAST MEDICAL & SURGICAL HISTORY:  Bacterial UTI      Atrial fibrillation      HTN (hypertension)      Dementia          SOCIAL HISTORY: Substance Use (street drugs): ( x ) never used  (  ) other:    FAMILY HISTORY:          PHYSICAL EXAM:  T(C): 36.4 (08-05-24 @ 11:01), Max: 36.6 (08-04-24 @ 20:39)  HR: 80 (08-05-24 @ 11:01) (75 - 81)  BP: 109/54 (08-05-24 @ 11:01) (99/60 - 131/64)  RR: 18 (08-05-24 @ 11:01) (18 - 18)  SpO2: 97% (08-05-24 @ 11:01) (97% - 98%)  Wt(kg): --  I&O's Summary      GENERAL: NAD  EYES:   PERRLA   ENMT:   Moist mucous membranes, Good dentition, No lesions  Cardiovascular: Normal S1 S2, No JVD, No murmurs, No edema  Respiratory: Lungs clear to auscultation	  Gastrointestinal:  Soft, Non-tender, + BS	  Extremities: no edema                                    13.0   7.44  )-----------( 234      ( 05 Aug 2024 06:25 )             39.1     08-05    140  |  106  |  32<H>  ----------------------------<  84  5.0   |  23  |  1.16    Ca    9.2      05 Aug 2024 06:25    TPro  6.0  /  Alb  3.0<L>  /  TBili  0.2  /  DBili  x   /  AST  19  /  ALT  <5<L>  /  AlkPhos  72  08-05    proBNP:   Lipid Profile:   HgA1c:   TSH:     Consultant(s) Notes Reviewed:  [x ] YES  [ ] NO    Care Discussed with Consultants/Other Providers [ x] YES  [ ] NO    Imaging Personally Reviewed independently:  [x] YES  [ ] NO    All labs, radiologic studies, vitals, orders and medications list reviewed. Patient is seen and examined at bedside. Case discussed with medical team.
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
## 2024-08-05 NOTE — PROGRESS NOTE ADULT - SUBJECTIVE AND OBJECTIVE BOX
Debby Catherine MD   [facility] Medicine  Teams preferred  Pager: [phone]    Patient is a 89y old  Male who presents with a chief complaint of agitation (04 Aug 2024 12:35)      INTERVAL HPI/OVERNIGHT EVENTS: no issues overnight. denies pain. no other concerns, goes back to sleep afterward    MEDICATIONS  (STANDING):  aMIOdarone    Tablet 100 milliGRAM(s) Oral daily  apixaban 2.5 milliGRAM(s) Oral two times a day  aspirin  chewable 81 milliGRAM(s) Oral daily  atorvastatin 20 milliGRAM(s) Oral at bedtime  ceFAZolin   IVPB 2000 milliGRAM(s) IV Intermittent every 8 hours  ceFAZolin   IVPB      divalproex Sprinkle 250 milliGRAM(s) Oral <User Schedule>  melatonin 3 milliGRAM(s) Oral at bedtime  metoprolol tartrate 25 milliGRAM(s) Oral two times a day  mupirocin 2% Ointment 1 Application(s) Topical <User Schedule>  risperiDONE  Disintegrating Tablet 0.5 milliGRAM(s) Oral <User Schedule>  risperiDONE  Disintegrating Tablet 1.5 milliGRAM(s) Oral <User Schedule>    MEDICATIONS  (PRN):  acetaminophen     Tablet .. 650 milliGRAM(s) Oral every 6 hours PRN Temp greater or equal to 38C (100.4F), Mild Pain (1 - 3)  OLANZapine Injectable 1.25 milliGRAM(s) IntraMuscular every 6 hours PRN aggression      Allergies    No Known Allergies    Intolerances        REVIEW OF SYSTEMS:  Please see interval HPI:    I&O's Detail        PHYSICAL EXAM:  Vital Signs Last 24 Hrs  T(C): 36.4 (05 Aug 2024 11:01), Max: 36.6 (04 Aug 2024 20:39)  T(F): 97.5 (05 Aug 2024 11:01), Max: 97.8 (04 Aug 2024 20:39)  HR: 80 (05 Aug 2024 11:01) (75 - 81)  BP: 109/54 (05 Aug 2024 11:01) (99/60 - 131/64)  BP(mean): --  RR: 18 (05 Aug 2024 11:01) (18 - 18)  SpO2: 97% (05 Aug 2024 11:01) (97% - 98%)    Parameters below as of 05 Aug 2024 11:01  Patient On (Oxygen Delivery Method): room air      CONSTITUTIONAL: NAD,   ENMT: Moist oral mucosa,   RESPIRATORY: Normal respiratory effort; lungs are clear to auscultation bilaterally  CARDIOVASCULAR: Regular rate and rhythm, normal S1 and S2, no murmur/rub/gallop;   ABDOMEN: Nontender to palpation, normoactive bowel sounds, no rebound/guarding; No hepatosplenomegaly  EXT: no edema b/l RLE: clean dressing in place  NEUROLOGY: alert, oriented to name, answers simple questions with coaxing  SKIN: scabbing noted on skin, Rt foot dressing      LABS:                        13.0   7.44  )-----------( 234      ( 05 Aug 2024 06:25 )             39.1     05 Aug 2024 06:25    140    |  106    |  32     ----------------------------<  84     5.0     |  23     |  1.16     Ca    9.2        05 Aug 2024 06:25    TPro  6.0    /  Alb  3.0    /  TBili  0.2    /  DBili  x      /  AST  19     /  ALT  <5     /  AlkPhos  72     05 Aug 2024 06:25      CAPILLARY BLOOD GLUCOSE        BLOOD CULTURE    RADIOLOGY & ADDITIONAL TESTS:    Imaging Personally Reviewed:  [ ] YES     Consultant(s) Notes Reviewed:      Care Discussed with Consultants/Other Providers:

## 2024-08-05 NOTE — BH CONSULTATION LIAISON PROGRESS NOTE - NSBHCONSULTFOLLOWAFTERCARE_PSY_A_CORE FT
No psych c/i to DC to [facility] or other safe dispo if no PRNs in 48h 
No psych c/i to DC to [facility] or LTC
No psych c/i to DC to [facility] or other safe dispo if no PRNs in 48h

## 2024-08-05 NOTE — BH CONSULTATION LIAISON PROGRESS NOTE - NSBHFUPINTERVALHXFT_PSY_A_CORE
aox1, unchanged, no EPS on exam, follows 1 step commands, no AH/VH noted, says "I don't know what's going on"

## 2024-08-05 NOTE — BH CONSULTATION LIAISON PROGRESS NOTE - NSBHMSEAFFCONG_PSY_A_CORE
Congruent
Unable to assess
Non-congruent
Unable to assess
Congruent
Unable to assess

## 2024-08-05 NOTE — BH CONSULTATION LIAISON PROGRESS NOTE - NSBHMSESPEECH_PSY_A_CORE
Abnormal as indicated, otherwise normal...
Normal volume, rate, productivity, spontaneity and articulation
Abnormal as indicated, otherwise normal...

## 2024-08-05 NOTE — BH CONSULTATION LIAISON PROGRESS NOTE - NSBHMSESPABN_PSY_A_CORE
Decreased productivity
Decreased productivity
Other
Decreased productivity
Decreased productivity
Impaired articulation
Decreased productivity
Decreased productivity

## 2024-08-05 NOTE — BH CONSULTATION LIAISON PROGRESS NOTE - NSBHFUPREASONCONS_PSY_A_CORE
Medication Management appointment with Ameena Astudillo.  Appointment on: 4/13/23  11:30am arrive at 11:15am  [facility] [address]  [address] [place] [address]  [phone]      This patient is referred to the [facility] PHP, IOP, RTC, Ken, OP Program, OP Program or [facility] OP Provider. Providers in these  programs have access to the EMR      If you are interested in a dual diagnosis program or an eating disorder program call [facility] for an  Assessment      [facility]  [address] [place] [address]  P: [phone]  OR  [address] [place] [address]  P: [phone]  OR  [address] [place] [address]  P: [phone]      [facility] Resources:    [address]  [place] [address]  Phone: [phone]  Offers Mental Health and Substance Abuse Services    [facility]  [address] [facility] [address] [place] [address]  P: [phone]  Offers both Mental Health and Substance Abuse services    [facility]  [address] [place] [address]  P: [phone]    OR    [address] [place] [address]  P: [phone]    OR    [address] [place] [address]  P: [phone]  Offers Mental Health and Substance Abuse Services.      [facility]  [address] [place] [address]  P: [phone]  Offers Mental Health Services    OR    [address] [place] [address]  P: [phone]  Offers Mental Health and Substance Abuse Services      [facility]  [address]  [address]  [place] [address]  P: [phone]  Offers Substance Abuse Services      [address] [place] [address] ([place])  [address] [place] [address]  [phone]  Offers Substance Abuse Service    [facility]  [address] [place] [address]  [phone]  Offers Substance Abuse Services  
delirium/dementia

## 2024-08-05 NOTE — BH CONSULTATION LIAISON PROGRESS NOTE - NSBHCONSFOLLOWNEEDS_PSY_ALL_CORE
Needs further psych safety assessment prior to discharge
No psychiatric contraindications to discharge
No psychiatric contraindications to discharge
Needs further psych safety assessment prior to discharge
No psychiatric contraindications to discharge

## 2024-08-05 NOTE — BH CONSULTATION LIAISON PROGRESS NOTE - NSBHATTESTTYPEVISIT_PSY_A_CORE
Attending Only
Attending Only
no
Attending Only
Resident/Fellow with telephonic supervision
Attending with Resident/Fellow/Student

## 2024-08-05 NOTE — BH CONSULTATION LIAISON PROGRESS NOTE - NSBHMSEAFFQUAL_PSY_A_CORE
Depressed/Irritable
Euthymic
Depressed/Irritable
Depressed/Irritable
Euthymic
Depressed
Depressed/Irritable

## 2024-08-05 NOTE — BH CONSULTATION LIAISON PROGRESS NOTE - NSBHMSEMUSCLE_PSY_A_CORE
Normal muscle tone/strength
Other
Normal muscle tone/strength

## 2024-08-05 NOTE — BH CONSULTATION LIAISON PROGRESS NOTE - NSBHMSEMOOD_PSY_A_CORE
Unable to assess
Unable to assess
Other
Unable to assess
Other
Other

## 2024-08-06 ENCOUNTER — TRANSCRIPTION ENCOUNTER (OUTPATIENT)
Age: 89
End: 2024-08-06

## 2024-08-06 VITALS
DIASTOLIC BLOOD PRESSURE: 59 MMHG | OXYGEN SATURATION: 99 % | TEMPERATURE: 98 F | RESPIRATION RATE: 18 BRPM | HEART RATE: 79 BPM | SYSTOLIC BLOOD PRESSURE: 99 MMHG

## 2024-08-06 PROCEDURE — 99239 HOSP IP/OBS DSCHRG MGMT >30: CPT

## 2024-08-06 RX ORDER — CEPHALEXIN 250 MG
1 CAPSULE ORAL
Qty: 140 | Refills: 0
Start: 2024-08-06 | End: 2024-09-09

## 2024-08-06 RX ORDER — DIVALPROEX SODIUM 125 MG/1
2 CAPSULE, COATED PELLETS ORAL
Qty: 180 | Refills: 0
Start: 2024-08-06 | End: 2024-09-04

## 2024-08-06 RX ADMIN — DIVALPROEX SODIUM 250 MILLIGRAM(S): 125 CAPSULE, COATED PELLETS ORAL at 13:00

## 2024-08-06 RX ADMIN — Medication 100 MILLIGRAM(S): at 06:17

## 2024-08-06 RX ADMIN — MUPIROCIN CALCIUM 1 APPLICATION(S): 20 CREAM TOPICAL at 07:49

## 2024-08-06 RX ADMIN — AMIODARONE HYDROCHLORIDE 100 MILLIGRAM(S): 50 INJECTION, SOLUTION INTRAVENOUS at 06:16

## 2024-08-06 RX ADMIN — APIXABAN 2.5 MILLIGRAM(S): 5 TABLET, FILM COATED ORAL at 06:17

## 2024-08-06 RX ADMIN — DIVALPROEX SODIUM 250 MILLIGRAM(S): 125 CAPSULE, COATED PELLETS ORAL at 07:49

## 2024-08-06 RX ADMIN — Medication 25 MILLIGRAM(S): at 06:17

## 2024-08-06 RX ADMIN — RISPERIDONE 0.5 MILLIGRAM(S): 1 TABLET, FILM COATED ORAL at 07:49

## 2024-08-06 RX ADMIN — Medication 100 MILLIGRAM(S): at 13:00

## 2024-08-06 RX ADMIN — Medication 81 MILLIGRAM(S): at 12:05

## 2024-08-06 NOTE — PROGRESS NOTE ADULT - PROBLEM SELECTOR PLAN 1
R foot wound, per podiatry clinical concern for OM  - ESR 15, CRP 12  - SHERYL/PVR 7/27 reviewed   - MR R foot attempted on 7/31, could not tolerate due to agitation --d/w family deferring for now given repeat attempts would require increased sedation   - podiatry following, appreciated  - vascular consulted per podiatry rec angiogram--deferring   - s/p discussion w/ family; pt cannot tolerate most interventions due to agitation and noncooperation,  overall status and risk of worsening already poor mental status given need for sedation/anesthesia for these interventions (angio, OR bone bx) thus proceeding w/ conservative management with PO abx  - wound culture 7/24: MSSA  - c/w abx Unasyn (7/26-7/27)-->vancomycin (7/27-7/28)-->cefazolin (7/29-), per ID can dc on 6 wk course of Keflex PO when ready for dc till 10/7

## 2024-08-06 NOTE — PROGRESS NOTE ADULT - ASSESSMENT
EKG V Paced QTc 452     Assessment and Plan     1) QT prolongation : 2/2 Paced rhythm corrected QTc around 452ms , ok to continue Antipsychotics     2) Afib :cw metoprolol, amiodarone and eliquis     3)  Wound of right foot c/w abx, f/u podiatry and vascular    4) HTN- continue BP meds    5) Stage 3 chronic kidney disease. - trend Cr, renally dose meds, avoid nephrotoxic agents.    6)  Dementia with agitation. - psych following, risperidone 0.5/1.5 mg (increased pm dose 8/2) + Depakote 250 mg TID      7) DVT PPX eliquis

## 2024-08-06 NOTE — PROGRESS NOTE ADULT - PROBLEM/PLAN-2
DISPLAY PLAN FREE TEXT
none
DISPLAY PLAN FREE TEXT

## 2024-08-06 NOTE — PROGRESS NOTE ADULT - SUBJECTIVE AND OBJECTIVE BOX
ePrcy Teran MD  Interventional Cardiology / Endovascular Specialist  [place] Office : [address] [place] [address] [place] [address]  Tel:   [place] Office : [address] [facility] [place] [address]  Tel: [phone]  Cell : [phone]    HISTORY OF PRESENTING ILLNESS:  HPI:  Pt seen and examined at bedside, not in acute distress, denies CP/SOB/Palp.  	  MEDICATIONS:  aMIOdarone    Tablet 100 milliGRAM(s) Oral daily  apixaban 2.5 milliGRAM(s) Oral two times a day  aspirin  chewable 81 milliGRAM(s) Oral daily  metoprolol tartrate 25 milliGRAM(s) Oral two times a day    ceFAZolin   IVPB      ceFAZolin   IVPB 2000 milliGRAM(s) IV Intermittent every 8 hours      acetaminophen     Tablet .. 650 milliGRAM(s) Oral every 6 hours PRN  divalproex Sprinkle 250 milliGRAM(s) Oral <User Schedule>  melatonin 3 milliGRAM(s) Oral at bedtime  OLANZapine Injectable 1.25 milliGRAM(s) IntraMuscular every 6 hours PRN  risperiDONE  Disintegrating Tablet 0.5 milliGRAM(s) Oral <User Schedule>  risperiDONE  Disintegrating Tablet 1.5 milliGRAM(s) Oral <User Schedule>      atorvastatin 20 milliGRAM(s) Oral at bedtime    mupirocin 2% Ointment 1 Application(s) Topical <User Schedule>      PAST MEDICAL/SURGICAL HISTORY  PAST MEDICAL & SURGICAL HISTORY:  Bacterial UTI      Atrial fibrillation      HTN (hypertension)      Dementia          SOCIAL HISTORY: Substance Use (street drugs): ( x ) never used  (  ) other:    FAMILY HISTORY:      REVIEW OF SYSTEMS:  CONSTITUTIONAL: No fever, weight loss, or fatigue  EYES: No eye pain, visual disturbances, or discharge  ENMT:  No difficulty hearing, tinnitus, vertigo; No sinus or throat pain  BREASTS: No pain, masses, or nipple discharge  GASTROINTESTINAL: No abdominal or epigastric pain. No nausea, vomiting, or hematemesis; No diarrhea or constipation. No melena or hematochezia.  GENITOURINARY: No dysuria, frequency, hematuria, or incontinence  NEUROLOGICAL: No headaches, memory loss, loss of strength, numbness, or tremors  ENDOCRINE: No heat or cold intolerance; No hair loss  MUSCULOSKELETAL: No joint pain or swelling; No muscle, back, or extremity pain  PSYCHIATRIC: No depression, anxiety, mood swings, or difficulty sleeping  HEME/LYMPH: No easy bruising, or bleeding gums  All others negative    PHYSICAL EXAM:  T(C): 36.6 (08-06-24 @ 11:44), Max: 36.6 (08-06-24 @ 11:44)  HR: 79 (08-06-24 @ 11:44) (79 - 85)  BP: 99/59 (08-06-24 @ 11:44) (99/59 - 120/73)  RR: 18 (08-06-24 @ 11:44) (18 - 18)  SpO2: 99% (08-06-24 @ 11:44) (96% - 99%)  Wt(kg): --  I&O's Summary        GENERAL: NAD, well-groomed, well-developed  EYES: EOMI, PERRLA, conjunctiva and sclera clear  ENMT: No tonsillar erythema, exudates, or enlargement; Moist mucous membranes, Good dentition, No lesions  Cardiovascular: Normal S1 S2, No JVD, No murmurs, No edema  Respiratory: Lungs clear to auscultation	  Gastrointestinal:  Soft, Non-tender, + BS	  Extremities: Normal range of motion, No clubbing, cyanosis or edema  LYMPH: No lymphadenopathy noted  NERVOUS SYSTEM:  Alert & Oriented X3, Good concentration; Motor Strength 5/5 B/L upper and lower extremities; DTRs 2+ intact and symmetric  SKIN: BLE hyperpigmentation, scaly                                    13.0   7.44  )-----------( 234      ( 05 Aug 2024 06:25 )             39.1     08-05    140  |  106  |  32<H>  ----------------------------<  84  5.0   |  23  |  1.16    Ca    9.2      05 Aug 2024 06:25    TPro  6.0  /  Alb  3.0<L>  /  TBili  0.2  /  DBili  x   /  AST  19  /  ALT  <5<L>  /  AlkPhos  72  08-05    proBNP:   Lipid Profile:   HgA1c:   TSH:     Consultant(s) Notes Reviewed:  [x ] YES  [ ] NO    Care Discussed with Consultants/Other Providers [ x] YES  [ ] NO    Imaging Personally Reviewed independently:  [x] YES  [ ] NO    All labs, radiologic studies, vitals, orders and medications list reviewed. Patient is seen and examined at bedside. Case discussed with medical team.

## 2024-08-06 NOTE — PROGRESS NOTE ADULT - SUBJECTIVE AND OBJECTIVE BOX
Debby Catherine MD   [facility] Medicine  Teams preferred  Pager: [phone]    Patient is a 89y old  Male who presents with a chief complaint of agitation (05 Aug 2024 17:43)      INTERVAL HPI/OVERNIGHT EVENTS: no issues overnight. spoke with Pt's wife seems more alert today than previous. wakes up for meals. no other concerns    MEDICATIONS  (STANDING):  aMIOdarone    Tablet 100 milliGRAM(s) Oral daily  apixaban 2.5 milliGRAM(s) Oral two times a day  aspirin  chewable 81 milliGRAM(s) Oral daily  atorvastatin 20 milliGRAM(s) Oral at bedtime  ceFAZolin   IVPB      ceFAZolin   IVPB 2000 milliGRAM(s) IV Intermittent every 8 hours  divalproex Sprinkle 250 milliGRAM(s) Oral <User Schedule>  melatonin 3 milliGRAM(s) Oral at bedtime  metoprolol tartrate 25 milliGRAM(s) Oral two times a day  mupirocin 2% Ointment 1 Application(s) Topical <User Schedule>  risperiDONE  Disintegrating Tablet 0.5 milliGRAM(s) Oral <User Schedule>  risperiDONE  Disintegrating Tablet 1.5 milliGRAM(s) Oral <User Schedule>    MEDICATIONS  (PRN):  acetaminophen     Tablet .. 650 milliGRAM(s) Oral every 6 hours PRN Temp greater or equal to 38C (100.4F), Mild Pain (1 - 3)  OLANZapine Injectable 1.25 milliGRAM(s) IntraMuscular every 6 hours PRN aggression      Allergies    No Known Allergies    Intolerances        REVIEW OF SYSTEMS:  Please see interval HPI:    I&O's Detail        PHYSICAL EXAM:  Vital Signs Last 24 Hrs  T(C): 36.6 (06 Aug 2024 11:44), Max: 36.6 (06 Aug 2024 11:44)  T(F): 97.8 (06 Aug 2024 11:44), Max: 97.8 (06 Aug 2024 11:44)  HR: 79 (06 Aug 2024 11:44) (79 - 85)  BP: 99/59 (06 Aug 2024 11:44) (99/59 - 120/73)  BP(mean): 78 (06 Aug 2024 05:08) (78 - 78)  RR: 18 (06 Aug 2024 11:44) (18 - 18)  SpO2: 99% (06 Aug 2024 11:44) (96% - 99%)    Parameters below as of 06 Aug 2024 11:44  Patient On (Oxygen Delivery Method): room air      CONSTITUTIONAL: NAD,   ENMT: Moist oral mucosa,   RESPIRATORY: Normal respiratory effort; lungs are clear to auscultation bilaterally  CARDIOVASCULAR: Regular rate and rhythm, normal S1 and S2, no murmur/rub/gallop; No lower extremity edema;   ABDOMEN: Nontender to palpation, normoactive bowel sounds, no rebound/guarding; No hepatosplenomegaly  EXT: no edema b/l  NEUROLOGY: alert, following commands  SKIN: right foot. erythema resolved. some scabbing noted and healing skin. no drainage on second toe      LABS:      Ca    9.2        05 Aug 2024 06:25        CAPILLARY BLOOD GLUCOSE        BLOOD CULTURE    RADIOLOGY & ADDITIONAL TESTS:    Imaging Personally Reviewed:  [ ] YES     Consultant(s) Notes Reviewed:      Care Discussed with Consultants/Other Providers:

## 2024-08-06 NOTE — PROGRESS NOTE ADULT - PROBLEM SELECTOR PLAN 7
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 Advanced chronic microvascular ischemic changes.  - on asa 81 mg and atorvastatin 20 mg

## 2024-08-06 NOTE — PROGRESS NOTE ADULT - PROBLEM SELECTOR PROBLEM 8
Bladder cancer

## 2024-08-06 NOTE — PROGRESS NOTE ADULT - PROBLEM SELECTOR PROBLEM 4
HTN (hypertension)
Stage 3 chronic kidney disease
HTN (hypertension)

## 2024-08-06 NOTE — PROGRESS NOTE ADULT - PROBLEM SELECTOR PROBLEM 6
Elevated TSH
Elevated TSH
Prophylactic measure
Elevated TSH
Prophylactic measure
Elevated TSH
Prophylactic measure
Elevated TSH

## 2024-08-06 NOTE — DISCHARGE NOTE NURSING/CASE MANAGEMENT/SOCIAL WORK - NSDCPEFALRISK_GEN_ALL_CORE
For information on Fall & Injury Prevention, visit: [email or web address] OR  [email or web address] OR  https://www.cdc.gov/steadi/patient.html

## 2024-08-06 NOTE — PROGRESS NOTE ADULT - REASON FOR ADMISSION
agitation
Normal for race
agitation

## 2024-08-06 NOTE — PROGRESS NOTE ADULT - PROBLEM SELECTOR PROBLEM 1
Dementia with agitation
Wound of right foot
Dementia with agitation
Wound of right foot
Dementia with agitation
Dementia with agitation
Wound of right foot
Arterial insufficiency with ischemic ulcer
Arterial insufficiency with ischemic ulcer
Dementia with agitation
Dementia with agitation
Wound of right foot

## 2024-08-06 NOTE — DISCHARGE NOTE NURSING/CASE MANAGEMENT/SOCIAL WORK - NSDPFAC_GEN_ALL_CORE
[facility] Address: [address] [id number] Phone: [phone]

## 2024-08-06 NOTE — DISCHARGE NOTE NURSING/CASE MANAGEMENT/SOCIAL WORK - PATIENT PORTAL LINK FT
You can access the FollowMyHealth Patient Portal offered by [facility] by registering at the following website: [email or web address]. By joining [employer]’s FollowMyHealth portal, you will also be able to view your health information using other applications (apps) compatible with our system.

## 2024-08-06 NOTE — PROGRESS NOTE ADULT - NSPROGADDITIONALINFOA_GEN_ALL_CORE

## 2024-08-06 NOTE — PROGRESS NOTE ADULT - TIME BILLING
Time-based billing (NON-critical care).     38 minutes spent on total encounter; more than 50% of the visit was spent counseling and / or coordinating care by the attending physician.  The necessity of the time spent during the encounter on this date of service was due to:     review of laboratory data, radiology results, consultants' recommendations, documentation in [place], discussion with patient
Time-based billing (NON-critical care).     38 minutes spent on total encounter; more than 50% of the visit was spent counseling and / or coordinating care by the attending physician.  The necessity of the time spent during the encounter on this date of service was due to:     review of laboratory data, radiology results, consultants' recommendations, documentation in [place], discussion with patient
Time-based billing (NON-critical care).     38 minutes spent on total encounter; more than 50% of the visit was spent counseling and / or coordinating care by the attending physician.  The necessity of the time spent during the encounter on this date of service was due to:     review of laboratory data, radiology results, consultants' recommendations, documentation in [place], discussion with patient
Time spent includes direct patient care  (interview and examination of patient), discussion with other providers, support staff and/or patient's family members, review of medical records, ordering diagnostic tests and analyzing results, and documentation.
reviewing laboratory data, consultants' recommendations, documentation in [place], performing medically appropriate examinations/evaluations, discussion with patient/family/RN/ACP/Residents and interdisciplinary staff (such as , social workers, etc), counseling patient/family/care giver, ordering medically appropriate medication, tests, or procedures
Time-based billing (NON-critical care).     36 minutes spent on total encounter; more than 50% of the visit was spent counseling and / or coordinating care by the attending physician.  The necessity of the time spent during the encounter on this date of service was due to:     review of laboratory data, radiology results, consultants' recommendations, documentation in [place], discussion with patient
Time spent includes direct patient care  (interview and examination of patient), discussion with other providers, support staff and/or patient's family members, review of medical records, ordering diagnostic tests and analyzing results, and documentation.

## 2024-08-06 NOTE — PROGRESS NOTE ADULT - PROBLEM SELECTOR PLAN 6
TSH 13 with free T4 in middle of normal range  - will need repeat TFTs given free T4 wnl in 2-4 wks
TSH 13 with free T4 in middle of normal range  - will need repeat TFTs given free T4 wnl in 4 wks
DVT prophylaxis: ELIQUIS  Diet: dash/tlc  GOC: extensive discussion on 7/24, patient DNR/DNI
TSH 13 with free T4 in middle of normal range  - will need repeat TFTs given free T4 wnl in 2-4 wks

## 2024-08-06 NOTE — PROGRESS NOTE ADULT - PROBLEM SELECTOR PROBLEM 3
Chronic atrial fibrillation
Chronic atrial fibrillation
HTN (hypertension)
HTN (hypertension)
Chronic atrial fibrillation
HTN (hypertension)
Chronic atrial fibrillation

## 2024-08-06 NOTE — PROGRESS NOTE ADULT - PROBLEM SELECTOR PROBLEM 2
Chronic atrial fibrillation
Wound of right foot
Chronic atrial fibrillation
Dementia with agitation
Dementia with agitation
Wound of right foot
Dementia with agitation
Chronic atrial fibrillation
5
Dementia with agitation
Wound of right foot
Dementia with agitation
Dementia with agitation

## 2024-08-06 NOTE — PROGRESS NOTE ADULT - PROBLEM SELECTOR PLAN 4
- c/w metoprolol 50 mg ER daily  - dc midodrine
c/w metoprolol.   Pt on midodrine for hypotension as well. can c/w hold parameters.
- c/w metoprolol 50 mg ER daily  - dc midodrine
Cr stable.   - continue to monitor  - avoid nephrotoxic agents
- c/w metoprolol 50 mg ER daily  - dc midodrine
Cr stable.   - continue to monitor  - avoid nephrotoxic agents
- c/w metoprolol 50 mg ER daily  - dc midodrine
c/w metoprolol.   Pt on midodrine for hypotension as well. can c/w hold parameters.
- c/w metoprolol 50 mg ER daily  - dc midodrine
Cr stable.   - continue to monitor  - avoid nephrotoxic agents
- c/w metoprolol 50 mg ER daily  - dc midodrine
c/w metoprolol.   Pt on midodrine for hypotension as well. can c/w hold parameters.
- c/w metoprolol 50 mg ER daily  - dc midodrine

## 2024-08-06 NOTE — PROGRESS NOTE ADULT - PROVIDER SPECIALTY LIST ADULT
Cardiology
Cardiology
Podiatry
Podiatry
Cardiology
Infectious Disease
Podiatry
Cardiology
Podiatry
Podiatry
Vascular Surgery
Cardiology
Podiatry
Podiatry
Vascular Surgery
Cardiology
Hospitalist
Internal Medicine
Hospitalist

## 2024-08-06 NOTE — PROGRESS NOTE ADULT - PROBLEM SELECTOR PROBLEM 5
Stage 3 chronic kidney disease
Prophylactic measure
Prophylactic measure
Stage 3 chronic kidney disease
Prophylactic measure
Stage 3 chronic kidney disease

## 2024-08-08 LAB
CULTURE RESULTS: ABNORMAL
ORGANISM # SPEC MICROSCOPIC CNT: ABNORMAL
ORGANISM # SPEC MICROSCOPIC CNT: ABNORMAL
SPECIMEN SOURCE: SIGNIFICANT CHANGE UP

## 2024-11-14 NOTE — PROGRESS NOTE ADULT - PROBLEM/PLAN-5
11/14/24 1326   Services At/After Discharge   Transition of Care Consult (CM Consult) N/A   Services At/After Discharge None   [place] Resource Information Provided? No   Mode of Transport at Discharge Self   Confirm Follow Up Transport Family     Mr. Marcial was discharged home today. He REFUSED home health Mr. Marcial agrees with dc plan. He is aware to contact CM for any questions or concerns.     Transition of Care Plan:    RUR: 21% HIGH   Prior Level of Functioning: Independent  Disposition: Home. REFUSED    MEGAN: 11/14  If SNF or IPR: Date FOC offered:   Date FOC received:   Accepting facility:   Date authorization started with reference number:   Date authorization received and expires:   Follow up appointments: Gilberto Mott MD 10/25 at 10am   DME needed: None   Transportation at discharge:   IM/IMM Medicare/ letter given:   Is patient a  and connected with [facility]?    If yes, was  transfer form completed and [facility] notified?   Caregiver Contact:   Discharge Caregiver contacted prior to discharge?   Care Conference needed?   Barriers to discharge: None     
DISPLAY PLAN FREE TEXT
